# Patient Record
Sex: MALE | Race: ASIAN | NOT HISPANIC OR LATINO | Employment: FULL TIME | URBAN - METROPOLITAN AREA
[De-identification: names, ages, dates, MRNs, and addresses within clinical notes are randomized per-mention and may not be internally consistent; named-entity substitution may affect disease eponyms.]

---

## 2020-12-04 ENCOUNTER — TELEPHONE (OUTPATIENT)
Dept: SCHEDULING | Facility: IMAGING CENTER | Age: 50
End: 2020-12-04

## 2020-12-04 NOTE — LETTER
[facility]   Carlos Mcnamara M.D.  [address] [facility]   [place] [address]  Phone: [phone]  Fax: [phone]   Authorization for Release/Disclosure of   Protected Health Information   Name: CECIL ECHEVARRIA : 1970 SSN: [id number]   Address: [address] [place] [address] [place] [address] Phone:    [phone] (home)    I authorize the entity listed below to release/disclose the PHI below to:   [facility]/ Carlos Mcnamara M.D.. and Carlos Mcnamara M.D.   Provider or Entity Name: [facility]      Address: [address] [place] [address], Zip: [place] [address]   Phone:  [phone]    Fax:  [phone]   Reason for request: continuity of care   Information to be released:    [  ] LAST COLONOSCOPY,  including any PATH REPORT and follow-up  [  ] LAST FIT/COLOGUARD RESULT [  ] LAST DEXA  [  ] LAST MAMMOGRAM  [  ] LAST PAP  [  ] LAST LABS [  ] RETINA EXAM REPORT  [  ] IMMUNIZATION RECORDS  [ XXX ] Release all info      [  ] Check here and initial the line next to each item to release ALL health information INCLUDING  _____ Care and treatment for drug and / or alcohol abuse  _____ HIV testing, infection status, or AIDS  _____ Genetic Testing    DATES OF SERVICE OR TIME PERIOD TO BE DISCLOSED: _____________  I understand and acknowledge that:  * This Authorization may be revoked at any time by you in writing, except if your health information has already been used or disclosed.  * Your health information that will be used or disclosed as a result of you signing this authorization could be re-disclosed by the recipient. If this occurs, your re-disclosed health information may no longer be protected by State or Federal laws.  * You may refuse to sign this Authorization. Your refusal will not affect your ability to obtain treatment.  * This Authorization becomes effective upon signing and will  on (date) __________.      If no date is indicated, this Authorization will  one  (1) year from the signature date.    Name: Azeem Mejia    Signature: Continuity of Care    Date:     12/4/2020       PLEASE FAX REQUESTED RECORDS BACK TO: [phone]

## 2020-12-10 ENCOUNTER — OFFICE VISIT (OUTPATIENT)
Dept: MEDICAL GROUP | Age: 50
End: 2020-12-10
Payer: COMMERCIAL

## 2020-12-10 VITALS
TEMPERATURE: 98.5 F | HEART RATE: 72 BPM | DIASTOLIC BLOOD PRESSURE: 72 MMHG | SYSTOLIC BLOOD PRESSURE: 102 MMHG | BODY MASS INDEX: 27.19 KG/M2 | HEIGHT: 66 IN | OXYGEN SATURATION: 95 % | WEIGHT: 169.2 LBS

## 2020-12-10 DIAGNOSIS — Z12.11 SCREENING FOR MALIGNANT NEOPLASM OF COLON: ICD-10-CM

## 2020-12-10 DIAGNOSIS — E55.9 HYPOVITAMINOSIS D: ICD-10-CM

## 2020-12-10 DIAGNOSIS — Z00.00 HEALTH CARE MAINTENANCE: ICD-10-CM

## 2020-12-10 DIAGNOSIS — Z12.5 SCREENING FOR MALIGNANT NEOPLASM OF PROSTATE: ICD-10-CM

## 2020-12-10 DIAGNOSIS — E78.5 DYSLIPIDEMIA: ICD-10-CM

## 2020-12-10 DIAGNOSIS — J45.20 INTERMITTENT ASTHMA WITHOUT COMPLICATION, UNSPECIFIED ASTHMA SEVERITY: ICD-10-CM

## 2020-12-10 DIAGNOSIS — R53.83 FATIGUE, UNSPECIFIED TYPE: ICD-10-CM

## 2020-12-10 DIAGNOSIS — Z72.51 HIGH RISK SEXUAL BEHAVIOR, UNSPECIFIED TYPE: ICD-10-CM

## 2020-12-10 DIAGNOSIS — Z00.00 ANNUAL PHYSICAL EXAM: ICD-10-CM

## 2020-12-10 DIAGNOSIS — J30.2 SEASONAL ALLERGIES: ICD-10-CM

## 2020-12-10 PROCEDURE — 99386 PREV VISIT NEW AGE 40-64: CPT | Performed by: INTERNAL MEDICINE

## 2020-12-10 PROCEDURE — 99213 OFFICE O/P EST LOW 20 MIN: CPT | Mod: 25 | Performed by: INTERNAL MEDICINE

## 2020-12-10 RX ORDER — ALBUTEROL SULFATE 90 UG/1
2 AEROSOL, METERED RESPIRATORY (INHALATION) EVERY 6 HOURS PRN
Qty: 8.5 G | Refills: 3 | Status: SHIPPED | OUTPATIENT
Start: 2020-12-10 | End: 2021-10-23 | Stop reason: SDUPTHER

## 2020-12-10 SDOH — HEALTH STABILITY: MENTAL HEALTH: HOW OFTEN DO YOU HAVE 6 OR MORE DRINKS ON ONE OCCASION?: NEVER

## 2020-12-10 SDOH — HEALTH STABILITY: MENTAL HEALTH: HOW MANY STANDARD DRINKS CONTAINING ALCOHOL DO YOU HAVE ON A TYPICAL DAY?: 1 OR 2

## 2020-12-10 SDOH — HEALTH STABILITY: MENTAL HEALTH: HOW OFTEN DO YOU HAVE A DRINK CONTAINING ALCOHOL?: MONTHLY OR LESS

## 2020-12-10 ASSESSMENT — PATIENT HEALTH QUESTIONNAIRE - PHQ9: CLINICAL INTERPRETATION OF PHQ2 SCORE: 0

## 2020-12-10 ASSESSMENT — ANXIETY QUESTIONNAIRES
3. WORRYING TOO MUCH ABOUT DIFFERENT THINGS: NOT AT ALL
7. FEELING AFRAID AS IF SOMETHING AWFUL MIGHT HAPPEN: NOT AT ALL
4. TROUBLE RELAXING: NOT AT ALL
2. NOT BEING ABLE TO STOP OR CONTROL WORRYING: NOT AT ALL
5. BEING SO RESTLESS THAT IT IS HARD TO SIT STILL: NOT AT ALL
1. FEELING NERVOUS, ANXIOUS, OR ON EDGE: NOT AT ALL
6. BECOMING EASILY ANNOYED OR IRRITABLE: NOT AT ALL
GAD7 TOTAL SCORE: 0

## 2020-12-10 NOTE — PROGRESS NOTES
CHIEF COMPLAINT     Chief Complaint   Patient presents with   • Establish Care   Asthma, STD testing    HPI  Azeem Mejia is a 50 y.o. male who presents today for the following     Dyslipidemia  The patient had abnormal lipid panel, no medications  Diet: Advised low-calorie  Exercise: Advised daily  BMI: 27  FH: neg     Hypovitaminosis D, fatigue  The patient had low vitamin D level.  Complains of fatigue  Vitamin D supplement: Multivitamins.    Allergies, asthma  Onset: Childhood  Current symptoms: Denies nasal congestion, sneezing, wheezing, chest pain  Current treatment: none  - Albuterol use: Not recent  Course: Improved, stable  FH of asthma: Sister    High risk homosexual behavior, MSM  Requested STD testing, the last was done 2 years ago with previous PCP.  Denies::   · Abnormal penile discharge, sores  · Fever, chills, anorexia, weight loss  · Skin rash  · Dysuria  · Frequency  · Urgency  · Suprapubic discomfort  · Abdominal/flank pain    Reviewed PMH, PSH, FH, SH, ALL, HCM/IMM, no changes  Reviewed MEDS, no changes    CURRENT MEDICATIONS  Current Outpatient Medications   Medication Sig Dispense Refill   • albuterol 108 (90 Base) MCG/ACT Aero Soln inhalation aerosol Inhale 2 Puffs every 6 hours as needed for Shortness of Breath. 8.5 g 3     No current facility-administered medications for this visit.      ALLERGIES  Allergies: Patient has no allergy information on record.  PAST MEDICAL HISTORY  Past Medical History:   Diagnosis Date   • Asthma    • Hyperlipidemia      SURGICAL HISTORY  He  has no past surgical history on file.  SOCIAL HISTORY  Social History     Tobacco Use   • Smoking status: Never Smoker   • Smokeless tobacco: Never Used   Substance Use Topics   • Alcohol use: Yes     Frequency: Monthly or less     Drinks per session: 1 or 2     Binge frequency: Never   • Drug use: Not Currently     Social History     Social History Narrative   • Not on file     FAMILY HISTORY  Family History  "  Problem Relation Age of Onset   • Diabetes Father    • Asthma Sister    • Hyperlipidemia Neg Hx    • Hypertension Neg Hx      Family Status   Relation Name Status   • Fa  (Not Specified)   • Sis  (Not Specified)   • Neg Hx  (Not Specified)     ROS   Constitutional: Negative for fever, chills, fatigue.  HENT: Negative for congestion, sore throat.  Eyes: Negative for vision problems.   Respiratory: Negative for cough, shortness of breath.  Cardiovascular: Negative for chest pain, palpitations.   Gastrointestinal: Negative for heartburn, nausea, abdominal pain.   Genitourinary: Negative for dysuria.  Musculoskeletal: Negative for significant myalgia, back and joint pain.   Skin: Negative for rash.   Neuro: Negative for dizziness, weakness and headaches.   Endo/Heme/Allergies: Does not bruise/bleed easily.   Psychiatric/Behavioral: Negative for depression.    Objective   Blood Pressure 102/72 (BP Location: Right arm, Patient Position: Sitting, BP Cuff Size: Adult)   Pulse 72   Temperature 36.9 °C (98.5 °F) (Temporal)   Height 1.676 m (5' 6\")   Weight 76.7 kg (169 lb 3.2 oz)   Oxygen Saturation 95%   Body Mass Index 27.31 kg/m²   Physical Exam:  Constitutional: Alert, no distress, well-groomed.  Skin: No rash in visible areas.  Eye: Round. Conjunctiva clear, lids normal.  ENMT: Lips pink without lesions, good dentition. Phonation normal.  Neck: No visible masses or thyromegaly. Moves freely without pain.  CV: no peripheral cyanosis, tachycardia.  Respiratory: Unlabored respiratory effort, no cough or audible wheezing.  Psych: Alert and oriented x3, normal affect and mood.     Labs     Labs are reviewed and discussed with a patient  No results found for: CHOLSTRLTOT, LDL, HDL, TRIGLYCERIDE    No results found for: SODIUM, POTASSIUM, CHLORIDE, CO2, GLUCOSE, BUN, CREATININE, BUNCREATRAT, GLOMRATE  No results found for: ALKPHOSPHAT, ASTSGOT, ALTSGPT, TBILIRUBIN   No results found for: HBA1C  No results found for: " TSH  No results found for: FREET4    No results found for: WBC, RBC, HEMOGLOBIN, HEMATOCRIT, MCV, MCH, MCHC, MPV, NEUTSPOLYS, LYMPHOCYTES, MONOCYTES, EOSINOPHILS, BASOPHILS, HYPOCHROMIA, ANISOCYTOSIS     Imaging      None    Assessment and Plan     Azeem Mejia is a 50 y.o. male    1. Annual physical exam  Reviewed PMH, PSH, FH, SH, ALL, MEDS, HCM/IMM.   Advised healthy habits, diet, exercise.    2. Health care maintenance  Per HPI    3. Screening for malignant neoplasm of prostate  - PROSTATE SPECIFIC AG SCREENING; Future    4. Screening for malignant neoplasm of colon  - REFERRAL TO GI FOR COLONOSCOPY    5. Dyslipidemia  Advised per HPI, pending labs  - Comp Metabolic Panel; Future  - Lipid Profile; Future    6. Hypovitaminosis D  Continue current supplement, pending labs  - VITAMIN D,25 HYDROXY; Future    7. Fatigue, unspecified type  Pending labs  - CBC WITH DIFFERENTIAL; Future  - TSH WITH REFLEX TO FT4; Future    8. Seasonal allergies  9. Intermittent asthma without complication, unspecified asthma severity  No current symptoms, make continue to use OTC antihistamine, albuterol as needed  - albuterol 108 (90 Base) MCG/ACT Aero Soln inhalation aerosol; Inhale 2 Puffs every 6 hours as needed for Shortness of Breath.  Dispense: 8.5 g; Refill: 3    10. High risk sexual behavior, unspecified type  Discussed about PREP  - HIV AG/AB COMBO ASSAY SCREENING; Future  - HSV I/II IGG & IGM SERUM; Future  - Chlamydia/GC PCR Urine Or Swab; Future  - RPR    Follow-up: in 1 year and prn

## 2021-10-21 ENCOUNTER — HOSPITAL ENCOUNTER (OUTPATIENT)
Dept: LAB | Facility: MEDICAL CENTER | Age: 51
End: 2021-10-21
Attending: INTERNAL MEDICINE
Payer: COMMERCIAL

## 2021-10-21 DIAGNOSIS — Z72.51 HIGH RISK SEXUAL BEHAVIOR, UNSPECIFIED TYPE: ICD-10-CM

## 2021-10-21 DIAGNOSIS — R53.83 FATIGUE, UNSPECIFIED TYPE: ICD-10-CM

## 2021-10-21 DIAGNOSIS — Z12.5 SCREENING FOR MALIGNANT NEOPLASM OF PROSTATE: ICD-10-CM

## 2021-10-21 DIAGNOSIS — E55.9 HYPOVITAMINOSIS D: ICD-10-CM

## 2021-10-21 LAB
25(OH)D3 SERPL-MCNC: 28 NG/ML (ref 30–100)
BASOPHILS # BLD AUTO: 0.6 % (ref 0–1.8)
BASOPHILS # BLD: 0.08 K/UL (ref 0–0.12)
EOSINOPHIL # BLD AUTO: 0.51 K/UL (ref 0–0.51)
EOSINOPHIL NFR BLD: 3.9 % (ref 0–6.9)
ERYTHROCYTE [DISTWIDTH] IN BLOOD BY AUTOMATED COUNT: 40 FL (ref 35.9–50)
HCT VFR BLD AUTO: 43.3 % (ref 42–52)
HGB BLD-MCNC: 14.7 G/DL (ref 14–18)
HIV 1+2 AB+HIV1 P24 AG SERPL QL IA: NORMAL
IMM GRANULOCYTES # BLD AUTO: 0.04 K/UL (ref 0–0.11)
IMM GRANULOCYTES NFR BLD AUTO: 0.3 % (ref 0–0.9)
LYMPHOCYTES # BLD AUTO: 2.64 K/UL (ref 1–4.8)
LYMPHOCYTES NFR BLD: 20.2 % (ref 22–41)
MCH RBC QN AUTO: 32.7 PG (ref 27–33)
MCHC RBC AUTO-ENTMCNC: 33.9 G/DL (ref 33.7–35.3)
MCV RBC AUTO: 96.2 FL (ref 81.4–97.8)
MONOCYTES # BLD AUTO: 0.92 K/UL (ref 0–0.85)
MONOCYTES NFR BLD AUTO: 7 % (ref 0–13.4)
NEUTROPHILS # BLD AUTO: 8.87 K/UL (ref 1.82–7.42)
NEUTROPHILS NFR BLD: 68 % (ref 44–72)
NRBC # BLD AUTO: 0 K/UL
NRBC BLD-RTO: 0 /100 WBC
PLATELET # BLD AUTO: 248 K/UL (ref 164–446)
PMV BLD AUTO: 9.6 FL (ref 9–12.9)
PSA SERPL-MCNC: 0.84 NG/ML (ref 0–4)
RBC # BLD AUTO: 4.5 M/UL (ref 4.7–6.1)
TREPONEMA PALLIDUM IGG+IGM AB [PRESENCE] IN SERUM OR PLASMA BY IMMUNOASSAY: NORMAL
TSH SERPL DL<=0.005 MIU/L-ACNC: 2.35 UIU/ML (ref 0.38–5.33)
WBC # BLD AUTO: 13.1 K/UL (ref 4.8–10.8)

## 2021-10-21 PROCEDURE — 82306 VITAMIN D 25 HYDROXY: CPT

## 2021-10-21 PROCEDURE — 84153 ASSAY OF PSA TOTAL: CPT

## 2021-10-21 PROCEDURE — 36415 COLL VENOUS BLD VENIPUNCTURE: CPT

## 2021-10-21 PROCEDURE — 86780 TREPONEMA PALLIDUM: CPT

## 2021-10-21 PROCEDURE — 85025 COMPLETE CBC W/AUTO DIFF WBC: CPT

## 2021-10-21 PROCEDURE — 84443 ASSAY THYROID STIM HORMONE: CPT

## 2021-10-21 PROCEDURE — 86694 HERPES SIMPLEX NES ANTBDY: CPT

## 2021-10-21 PROCEDURE — 87389 HIV-1 AG W/HIV-1&-2 AB AG IA: CPT

## 2021-10-21 PROCEDURE — 87491 CHLMYD TRACH DNA AMP PROBE: CPT

## 2021-10-21 PROCEDURE — 87591 N.GONORRHOEAE DNA AMP PROB: CPT

## 2021-10-22 LAB
C TRACH DNA SPEC QL NAA+PROBE: NEGATIVE
N GONORRHOEA DNA SPEC QL NAA+PROBE: NEGATIVE
SPECIMEN SOURCE: NORMAL

## 2021-10-23 DIAGNOSIS — J45.20 INTERMITTENT ASTHMA WITHOUT COMPLICATION, UNSPECIFIED ASTHMA SEVERITY: ICD-10-CM

## 2021-10-24 LAB
HSV1+2 IGG SER IA-ACNC: >22.4 IV
HSV1+2 IGM SER IA-ACNC: 0.38 IV

## 2021-10-25 ENCOUNTER — TELEMEDICINE (OUTPATIENT)
Dept: MEDICAL GROUP | Age: 51
End: 2021-10-25
Payer: COMMERCIAL

## 2021-10-25 VITALS — BODY MASS INDEX: 26.2 KG/M2 | WEIGHT: 163 LBS | TEMPERATURE: 97.2 F | HEIGHT: 66 IN

## 2021-10-25 DIAGNOSIS — J30.2 SEASONAL ALLERGIES: ICD-10-CM

## 2021-10-25 DIAGNOSIS — Z12.11 SCREENING FOR MALIGNANT NEOPLASM OF COLON: ICD-10-CM

## 2021-10-25 DIAGNOSIS — Z00.00 HEALTH CARE MAINTENANCE: ICD-10-CM

## 2021-10-25 DIAGNOSIS — D72.829 LEUKOCYTOSIS, UNSPECIFIED TYPE: ICD-10-CM

## 2021-10-25 DIAGNOSIS — J45.20 INTERMITTENT ASTHMA WITHOUT COMPLICATION, UNSPECIFIED ASTHMA SEVERITY: ICD-10-CM

## 2021-10-25 DIAGNOSIS — D64.9 ANEMIA, UNSPECIFIED TYPE: ICD-10-CM

## 2021-10-25 DIAGNOSIS — E78.5 DYSLIPIDEMIA: ICD-10-CM

## 2021-10-25 DIAGNOSIS — Z00.00 ANNUAL PHYSICAL EXAM: ICD-10-CM

## 2021-10-25 PROCEDURE — 99396 PREV VISIT EST AGE 40-64: CPT | Mod: 95 | Performed by: INTERNAL MEDICINE

## 2021-10-25 RX ORDER — ALBUTEROL SULFATE 90 UG/1
2 AEROSOL, METERED RESPIRATORY (INHALATION) EVERY 6 HOURS PRN
Qty: 8.5 G | Refills: 1 | Status: ON HOLD | OUTPATIENT
Start: 2021-10-25 | End: 2022-10-24

## 2021-10-25 ASSESSMENT — PATIENT HEALTH QUESTIONNAIRE - PHQ9: CLINICAL INTERPRETATION OF PHQ2 SCORE: 0

## 2021-10-25 NOTE — PROGRESS NOTES
Telemedicine Visit: Established Patient     This Remote Face to Face encounter was conducted via Zoom. Given the importance of social distancing and other strategies recommended to reduce the risk of COVID-19 transmission, I am providing medical care to this patient via audio/video visit in place of an in person visit at the request of the patient. Verbal consent to telehealth, risks, benefits, and consequences were discussed. Patient retains the right to withdraw at any time. All existing confidentiality protections apply. The patient has access to all transmitted medical information. No dissemination of any patient images or information to other entities without further written consent.    CHIEF COMPLAINT     Chief Complaint   Patient presents with   • Annual Exam     HPI  Azeem Mejia is a 51 y.o. male who presents today for the following     HCM  Advised:  -Low-calorie diet   -Daily exercise  -Dental exam once to twice a year  -Sunscreen outdoors.    Immunizations  Due pneumonia, Shingrix, influenza, advised    Dyslipidemia  The patient had slightly abnormal lipid panel, no medications.  Diet /Exercise/BMI:  As above  FH: Unknown    Allergies/asthma, leukocytosis, anemia  Complains of intermittent nasal congestion and sneezing, intermittent cough with occasional sputum that is mainly clear/white, and very rarely slightly yellow.  Denies fever, chills, shortness of breath.    CBC came back with elevated WBC count with slight shift to the left.   RBC count was slightly decreased, normal Hgb/MCV.    Reviewed PMH, PSH, FH, SH, ALL, HCM/IMM, no changes  Reviewed MEDS, no changes    Patient Active Problem List    Diagnosis Date Noted   • Health care maintenance 12/10/2020   • Dyslipidemia 12/10/2020   • Seasonal allergies 12/10/2020   • Intermittent asthma without complication 12/10/2020     CURRENT MEDICATIONS  Current Outpatient Medications   Medication Sig Dispense Refill   • albuterol 108 (90 Base)  "MCG/ACT Aero Soln inhalation aerosol Inhale 2 Puffs every 6 hours as needed for Shortness of Breath. 8.5 g 1     No current facility-administered medications for this visit.     ALLERGIES  Allergies: Patient has no allergy information on record.  PAST MEDICAL HISTORY  Past Medical History:   Diagnosis Date   • Asthma    • Hyperlipidemia      SURGICAL HISTORY  He  has no past surgical history on file.  SOCIAL HISTORY  Social History     Tobacco Use   • Smoking status: Never Smoker   • Smokeless tobacco: Never Used   Vaping Use   • Vaping Use: Never used   Substance Use Topics   • Alcohol use: Yes   • Drug use: Not Currently     Social History     Social History Narrative   • Not on file     FAMILY HISTORY  Family History   Problem Relation Age of Onset   • Diabetes Father    • Asthma Sister    • Hyperlipidemia Neg Hx    • Hypertension Neg Hx      Family Status   Relation Name Status   • Fa  (Not Specified)   • Sis  (Not Specified)   • Neg Hx  (Not Specified)       ROS   Constitutional: Negative for fever, chills, fatigue.  HENT: Negative for congestion, sore throat.  Eyes: Negative for vision problems.   Respiratory: Negative for cough, shortness of breath.  Cardiovascular: Negative for chest pain, palpitations.   Gastrointestinal: Negative for heartburn, nausea, abdominal pain.   Genitourinary: Negative for dysuria.  Musculoskeletal: Negative for significant myalgia, back and joint pain.   Skin: Negative for rash.   Neuro: Negative for dizziness, weakness and headaches.   Endo/Heme/Allergies: Does not bruise/bleed easily.   Psychiatric/Behavioral: Negative for depression.    Objective   Vitals obtained by patient:  Temperature 36.2 °C (97.2 °F)   Height 1.676 m (5' 6\")   Weight 73.9 kg (163 lb)   Body Mass Index 26.31 kg/m²   Physical Exam:  Constitutional: Alert, no distress, well-groomed.  Skin: No rash in visible areas.  Eye: Round. Conjunctiva clear, lids normal.  ENMT: Lips pink without lesions, good " dentition. Phonation normal.  Neck: No visible masses or thyromegaly. Moves freely without pain.  CV: no peripheral cyanosis, tachycardia.  Respiratory: Unlabored respiratory effort, no cough or audible wheezing.  Psych: Alert and oriented x3, normal affect and mood.     Labs     Labs are reviewed and discussed with a patient    Lab Results   Component Value Date/Time    WBC 13.1 (H) 10/21/2021 08:12 AM    RBC 4.50 (L) 10/21/2021 08:12 AM    HEMOGLOBIN 14.7 10/21/2021 08:12 AM    HEMATOCRIT 43.3 10/21/2021 08:12 AM    MCV 96.2 10/21/2021 08:12 AM    MCH 32.7 10/21/2021 08:12 AM    MCHC 33.9 10/21/2021 08:12 AM    MPV 9.6 10/21/2021 08:12 AM    NEUTSPOLYS 68.00 10/21/2021 08:12 AM    LYMPHOCYTES 20.20 (L) 10/21/2021 08:12 AM    MONOCYTES 7.00 10/21/2021 08:12 AM    EOSINOPHILS 3.90 10/21/2021 08:12 AM    BASOPHILS 0.60 10/21/2021 08:12 AM      Imaging      None    Assessment and Plan     Azeem Mejia is a 51 y.o. male    1. Annual physical exam  Reviewed PMH, PSH, FH, SH, ALL, MEDS, HCM/IMM.   Advised healthy habits, diet, exercise.    2. Health care maintenance  Per HPI    3. Screening for malignant neoplasm of colon  - REFERRAL TO GI FOR COLONOSCOPY    4. Dyslipidemia  Borderline, advised low roshan diet, daily exercise, WT control    5. Seasonal allergies  6. Intermittent asthma without complication, unspecified asthma severity  No infection symptoms    7. Leukocytosis, unspecified type  Labs in 2-4 weeks, according to sx  - CBC WITH DIFFERENTIAL; Standing  - Sed Rate; Future  - CRP QUANTITIVE (NON-CARDIAC); Future    8. Anemia, unspecified type  F/u labs  - REFERRAL TO GI FOR COLONOSCOPY  - REFERRAL TO GI   - OCCULT BLOOD FECES IMMUNOASSAY; Future  - IRON/TOTAL IRON BIND; Future    Follow-up: according to results

## 2021-10-26 ENCOUNTER — HOSPITAL ENCOUNTER (OUTPATIENT)
Facility: MEDICAL CENTER | Age: 51
End: 2021-10-26
Attending: INTERNAL MEDICINE
Payer: COMMERCIAL

## 2021-10-26 ENCOUNTER — APPOINTMENT (OUTPATIENT)
Dept: LAB | Facility: MEDICAL CENTER | Age: 51
End: 2021-10-26
Attending: INTERNAL MEDICINE
Payer: COMMERCIAL

## 2021-10-26 DIAGNOSIS — E78.5 DYSLIPIDEMIA: ICD-10-CM

## 2021-10-26 PROCEDURE — 80061 LIPID PANEL: CPT

## 2021-10-26 PROCEDURE — 80053 COMPREHEN METABOLIC PANEL: CPT

## 2021-10-27 LAB
ALBUMIN SERPL BCP-MCNC: 4.2 G/DL (ref 3.2–4.9)
ALBUMIN/GLOB SERPL: 1.3 G/DL
ALP SERPL-CCNC: 121 U/L (ref 30–99)
ALT SERPL-CCNC: 41 U/L (ref 2–50)
ANION GAP SERPL CALC-SCNC: 12 MMOL/L (ref 7–16)
AST SERPL-CCNC: 39 U/L (ref 12–45)
BILIRUB SERPL-MCNC: 0.3 MG/DL (ref 0.1–1.5)
BUN SERPL-MCNC: 9 MG/DL (ref 8–22)
CALCIUM SERPL-MCNC: 8.7 MG/DL (ref 8.5–10.5)
CHLORIDE SERPL-SCNC: 104 MMOL/L (ref 96–112)
CHOLEST SERPL-MCNC: 168 MG/DL (ref 100–199)
CO2 SERPL-SCNC: 20 MMOL/L (ref 20–33)
CREAT SERPL-MCNC: 0.46 MG/DL (ref 0.5–1.4)
GLOBULIN SER CALC-MCNC: 3.2 G/DL (ref 1.9–3.5)
GLUCOSE SERPL-MCNC: 105 MG/DL (ref 65–99)
HDLC SERPL-MCNC: 47 MG/DL
LDLC SERPL CALC-MCNC: 73 MG/DL
POTASSIUM SERPL-SCNC: 4.1 MMOL/L (ref 3.6–5.5)
PROT SERPL-MCNC: 7.4 G/DL (ref 6–8.2)
SODIUM SERPL-SCNC: 136 MMOL/L (ref 135–145)
TRIGL SERPL-MCNC: 238 MG/DL (ref 0–149)

## 2022-06-28 ENCOUNTER — TELEMEDICINE (OUTPATIENT)
Dept: MEDICAL GROUP | Age: 52
End: 2022-06-28
Payer: COMMERCIAL

## 2022-06-28 VITALS — HEIGHT: 66 IN | WEIGHT: 161.2 LBS | BODY MASS INDEX: 25.91 KG/M2 | HEART RATE: 96 BPM

## 2022-06-28 DIAGNOSIS — Z12.11 SCREENING FOR MALIGNANT NEOPLASM OF COLON: ICD-10-CM

## 2022-06-28 DIAGNOSIS — M25.572 LEFT ANKLE PAIN, UNSPECIFIED CHRONICITY: ICD-10-CM

## 2022-06-28 DIAGNOSIS — Z72.51 HIGH RISK SEXUAL BEHAVIOR, UNSPECIFIED TYPE: ICD-10-CM

## 2022-06-28 PROCEDURE — 99214 OFFICE O/P EST MOD 30 MIN: CPT | Mod: 95 | Performed by: INTERNAL MEDICINE

## 2022-06-28 RX ORDER — LOTEPREDNOL ETABONATE 3.8 MG/G
GEL OPHTHALMIC
COMMUNITY
Start: 2021-10-31 | End: 2022-09-26

## 2022-06-28 RX ORDER — MELOXICAM 15 MG/1
15 TABLET ORAL DAILY
Qty: 60 TABLET | Refills: 1 | Status: SHIPPED | OUTPATIENT
Start: 2022-06-28 | End: 2022-09-26

## 2022-06-28 RX ORDER — SILDENAFIL 100 MG/1
100 TABLET, FILM COATED ORAL
Qty: 10 TABLET | Refills: 3 | Status: ON HOLD | OUTPATIENT
Start: 2022-06-28 | End: 2022-10-24

## 2022-06-28 ASSESSMENT — FIBROSIS 4 INDEX: FIB4 SCORE: 1.28

## 2022-06-28 ASSESSMENT — PATIENT HEALTH QUESTIONNAIRE - PHQ9: CLINICAL INTERPRETATION OF PHQ2 SCORE: 0

## 2022-06-28 NOTE — PROGRESS NOTES
Telemedicine Visit: Established Patient     This Remote Face to Face encounter was conducted via Zoom. Given the importance of social distancing and other strategies recommended to reduce the risk of COVID-19 transmission, I am providing medical care to this patient via audio/video visit in place of an in person visit at the request of the patient. Verbal consent to telehealth, risks, benefits, and consequences were discussed. Patient retains the right to withdraw at any time. All existing confidentiality protections apply. The patient has access to all transmitted medical information. No dissemination of any patient images or information to other entities without further written consent.    Chief Complaint   Patient presents with   • Leg Pain     HPI  Azeem Mejia is a 51 y.o. 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    - No history of recent spinal surgery or injection.    Denies:  - numbness/saddle anesthesia.  - bowel/bladder changes, fever.   - trauma  - nausea/vomiting  - chest pain, shortness of breath, abdominal pain.      STD testing   The patient changed his partner, requested STD testing  Denies:   · Dysuria  · Frequency  · Urgency  · Suprapubic discomfort  · Abdominal/flank pain  · Fever, chills  · Urine color/odor change  · Skin rash, fever, chills.      Reviewed PMH, PSH, FH, SH, ALL, HCM/IMM, no changes  Reviewed  MEDS, no changes    Patient Active Problem List    Diagnosis Date Noted   • Health care maintenance 12/10/2020   • Dyslipidemia 12/10/2020   • Seasonal allergies 12/10/2020   • Intermittent asthma without complication 12/10/2020     CURRENT MEDICATIONS  Current Outpatient Medications   Medication Sig Dispense Refill   • Loteprednol Etabonate (LOTEMAX SM) 0.38 % Gel      • Multiple Vitamin (MULTIVITAMIN ADULT PO) Take  by mouth.     • albuterol 108 (90 Base) MCG/ACT Aero Soln inhalation aerosol Inhale 2 Puffs every 6 hours as needed for Shortness of Breath. 8.5 g 1     No current facility-administered medications for this visit.     ALLERGIES  Allergies: Patient has no known allergies.  PAST MEDICAL HISTORY  Past Medical History:   Diagnosis Date   • Asthma    • Hyperlipidemia      SURGICAL HISTORY  He  has no past surgical history on file.  SOCIAL HISTORY  Social History     Tobacco Use   • Smoking status: Never Smoker   • Smokeless tobacco: Never Used   Vaping Use   • Vaping Use: Never used   Substance Use Topics   • Alcohol use: Yes   • Drug use: Not Currently     Social History     Social History Narrative   • Not on file     FAMILY HISTORY  Family History   Problem Relation Age of Onset   • Diabetes Father    • Asthma Sister    • Hyperlipidemia Neg Hx    • Hypertension Neg Hx      Family Status   Relation Name Status   • Fa  (Not Specified)   • Sis  (Not Specified)   • Neg Hx  (Not Specified)     ROS   Constitutional: Negative for fever, chills, fatigue.  HENT: Negative for congestion, sore throat.  Eyes: Negative for vision problems.   Respiratory: Negative for cough, shortness of breath.  Cardiovascular: Negative for chest pain, palpitations.   Gastrointestinal: Negative for heartburn, nausea, abdominal pain.   Genitourinary: Negative for dysuria.  Musculoskeletal: Per HPI.   Skin: Negative for rash.   Neuro: Negative for dizziness, weakness and headaches.   Endo/Heme/Allergies: Does not bruise/bleed easily.  "  Psychiatric/Behavioral: Negative for depression.    PHYSICAL EXAM   Vs obtained by patient  Pulse 96   Height 1.676 m (5' 6\")   Weight 73.1 kg (161 lb 3.2 oz)  Body mass index is 26.02 kg/m².  General:  NAD, well appearing  HEENT:   NC/AT, PERRLA, EOMI.  Cardiovascular: unlabored breathing, no peripheral cyanosis or swelling.  Lungs:   no respiratory distress.  Abdomen: non- distended.  Extremities:  No LE swelling.  Skin:  Warm, dry.  No erythema. No rash.   Neurologic: Alert & oriented x 3. CN II-XII grossly intact. No focal deficits.  Psychiatric:  Affect normal, mood normal, judgment normal.    Labs     Labs are reviewed and discussed with a patient  Lab Results   Component Value Date/Time    CHOLSTRLTOT 168 10/26/2021 03:15 PM    LDL 73 10/26/2021 03:15 PM    HDL 47 10/26/2021 03:15 PM    TRIGLYCERIDE 238 (H) 10/26/2021 03:15 PM       Lab Results   Component Value Date/Time    SODIUM 136 10/26/2021 03:15 PM    POTASSIUM 4.1 10/26/2021 03:15 PM    CHLORIDE 104 10/26/2021 03:15 PM    CO2 20 10/26/2021 03:15 PM    GLUCOSE 105 (H) 10/26/2021 03:15 PM    BUN 9 10/26/2021 03:15 PM    CREATININE 0.46 (L) 10/26/2021 03:15 PM     Lab Results   Component Value Date/Time    ALKPHOSPHAT 121 (H) 10/26/2021 03:15 PM    ASTSGOT 39 10/26/2021 03:15 PM    ALTSGPT 41 10/26/2021 03:15 PM    TBILIRUBIN 0.3 10/26/2021 03:15 PM      Lab Results   Component Value Date/Time    WBC 13.1 (H) 10/21/2021 08:12 AM    RBC 4.50 (L) 10/21/2021 08:12 AM    HEMOGLOBIN 14.7 10/21/2021 08:12 AM    HEMATOCRIT 43.3 10/21/2021 08:12 AM    MCV 96.2 10/21/2021 08:12 AM    MCH 32.7 10/21/2021 08:12 AM    MCHC 33.9 10/21/2021 08:12 AM    MPV 9.6 10/21/2021 08:12 AM    NEUTSPOLYS 68.00 10/21/2021 08:12 AM    LYMPHOCYTES 20.20 (L) 10/21/2021 08:12 AM    MONOCYTES 7.00 10/21/2021 08:12 AM    EOSINOPHILS 3.90 10/21/2021 08:12 AM    BASOPHILS 0.60 10/21/2021 08:12 AM      Imaging     Pending    Assessment and Plan     Azeem Mejia is a 51 " y.o. male    1. Left ankle pain, unspecified chronicity  Advised brace, activity as tolerated  - DX-ANKLE 2- VIEWS LEFT; Future  - Referral to Occupational Medicine  - meloxicam (MOBIC) 15 MG tablet; Take 1 Tablet by mouth every day.  Dispense: 60 Tablet; Refill: 1    2. High risk sexual behavior, unspecified type  Pending labs, advised to use condom  - HIV AG/AB COMBO ASSAY SCREENING; Future  - Chlamydia/GC, PCR (Urine); Future  - HSV I/II IGG & IGM SERUM; Future    3. Screening for malignant neoplasm of colon  - Referral to GI for Colonoscopy    Counseling:   - Smoking:  Nonsmoker    Followup: PRN    All questions are answered.    Please note that this dictation was created using voice recognition software, and that there might be errors of myranda and possibly content.

## 2022-08-17 ENCOUNTER — HOSPITAL ENCOUNTER (OUTPATIENT)
Dept: RADIOLOGY | Facility: MEDICAL CENTER | Age: 52
End: 2022-08-17
Attending: INTERNAL MEDICINE
Payer: COMMERCIAL

## 2022-08-17 ENCOUNTER — OFFICE VISIT (OUTPATIENT)
Dept: MEDICAL GROUP | Age: 52
End: 2022-08-17
Payer: COMMERCIAL

## 2022-08-17 ENCOUNTER — HOSPITAL ENCOUNTER (OUTPATIENT)
Dept: LAB | Facility: MEDICAL CENTER | Age: 52
End: 2022-08-17
Attending: INTERNAL MEDICINE
Payer: COMMERCIAL

## 2022-08-17 VITALS
BODY MASS INDEX: 26.71 KG/M2 | HEART RATE: 78 BPM | SYSTOLIC BLOOD PRESSURE: 132 MMHG | HEIGHT: 66 IN | TEMPERATURE: 96.9 F | WEIGHT: 166.2 LBS | OXYGEN SATURATION: 98 % | DIASTOLIC BLOOD PRESSURE: 84 MMHG

## 2022-08-17 DIAGNOSIS — R59.0 CERVICAL LYMPHADENOPATHY: ICD-10-CM

## 2022-08-17 DIAGNOSIS — D72.829 LEUKOCYTOSIS, UNSPECIFIED TYPE: ICD-10-CM

## 2022-08-17 DIAGNOSIS — D64.9 ANEMIA, UNSPECIFIED TYPE: ICD-10-CM

## 2022-08-17 DIAGNOSIS — Z72.51 HIGH RISK SEXUAL BEHAVIOR, UNSPECIFIED TYPE: ICD-10-CM

## 2022-08-17 LAB
BASOPHILS # BLD AUTO: 1 % (ref 0–1.8)
BASOPHILS # BLD: 0.07 K/UL (ref 0–0.12)
C TRACH DNA SPEC QL NAA+PROBE: NEGATIVE
EOSINOPHIL # BLD AUTO: 0.53 K/UL (ref 0–0.51)
EOSINOPHIL NFR BLD: 7.2 % (ref 0–6.9)
ERYTHROCYTE [DISTWIDTH] IN BLOOD BY AUTOMATED COUNT: 39.8 FL (ref 35.9–50)
ERYTHROCYTE [SEDIMENTATION RATE] IN BLOOD BY WESTERGREN METHOD: 3 MM/HOUR (ref 0–20)
HCT VFR BLD AUTO: 46 % (ref 42–52)
HGB BLD-MCNC: 15.4 G/DL (ref 14–18)
HIV 1+2 AB+HIV1 P24 AG SERPL QL IA: NORMAL
IMM GRANULOCYTES # BLD AUTO: 0.02 K/UL (ref 0–0.11)
IMM GRANULOCYTES NFR BLD AUTO: 0.3 % (ref 0–0.9)
LDH SERPL L TO P-CCNC: 191 U/L (ref 107–266)
LYMPHOCYTES # BLD AUTO: 1.96 K/UL (ref 1–4.8)
LYMPHOCYTES NFR BLD: 26.7 % (ref 22–41)
MCH RBC QN AUTO: 32.2 PG (ref 27–33)
MCHC RBC AUTO-ENTMCNC: 33.5 G/DL (ref 33.7–35.3)
MCV RBC AUTO: 96 FL (ref 81.4–97.8)
MONOCYTES # BLD AUTO: 0.48 K/UL (ref 0–0.85)
MONOCYTES NFR BLD AUTO: 6.5 % (ref 0–13.4)
N GONORRHOEA DNA SPEC QL NAA+PROBE: NEGATIVE
NEUTROPHILS # BLD AUTO: 4.29 K/UL (ref 1.82–7.42)
NEUTROPHILS NFR BLD: 58.3 % (ref 44–72)
NRBC # BLD AUTO: 0 K/UL
NRBC BLD-RTO: 0 /100 WBC
PLATELET # BLD AUTO: 290 K/UL (ref 164–446)
PMV BLD AUTO: 9.6 FL (ref 9–12.9)
QORDR QORDR: NORMAL
RBC # BLD AUTO: 4.79 M/UL (ref 4.7–6.1)
SPECIMEN SOURCE: NORMAL
URATE SERPL-MCNC: 6.5 MG/DL (ref 2.5–8.3)
WBC # BLD AUTO: 7.4 K/UL (ref 4.8–10.8)

## 2022-08-17 PROCEDURE — 36415 COLL VENOUS BLD VENIPUNCTURE: CPT

## 2022-08-17 PROCEDURE — 76536 US EXAM OF HEAD AND NECK: CPT

## 2022-08-17 PROCEDURE — 83615 LACTATE (LD) (LDH) ENZYME: CPT

## 2022-08-17 PROCEDURE — 87591 N.GONORRHOEAE DNA AMP PROB: CPT

## 2022-08-17 PROCEDURE — 86665 EPSTEIN-BARR CAPSID VCA: CPT

## 2022-08-17 PROCEDURE — 86663 EPSTEIN-BARR ANTIBODY: CPT

## 2022-08-17 PROCEDURE — 99214 OFFICE O/P EST MOD 30 MIN: CPT | Performed by: INTERNAL MEDICINE

## 2022-08-17 PROCEDURE — 86694 HERPES SIMPLEX NES ANTBDY: CPT

## 2022-08-17 PROCEDURE — 85025 COMPLETE CBC W/AUTO DIFF WBC: CPT

## 2022-08-17 PROCEDURE — 86664 EPSTEIN-BARR NUCLEAR ANTIGEN: CPT

## 2022-08-17 PROCEDURE — 87389 HIV-1 AG W/HIV-1&-2 AB AG IA: CPT

## 2022-08-17 PROCEDURE — 85652 RBC SED RATE AUTOMATED: CPT

## 2022-08-17 PROCEDURE — 84550 ASSAY OF BLOOD/URIC ACID: CPT

## 2022-08-17 PROCEDURE — 87491 CHLMYD TRACH DNA AMP PROBE: CPT

## 2022-08-17 RX ORDER — AMOXICILLIN AND CLAVULANATE POTASSIUM 875; 125 MG/1; MG/1
1 TABLET, FILM COATED ORAL 2 TIMES DAILY
Qty: 20 TABLET | Refills: 0 | Status: SHIPPED | OUTPATIENT
Start: 2022-08-17 | End: 2022-08-27

## 2022-08-17 ASSESSMENT — FIBROSIS 4 INDEX: FIB4 SCORE: 1.28

## 2022-08-17 NOTE — PROGRESS NOTES
CHIEF COMPLAINT  Chief Complaint   Patient presents with    Bump     Patient states that he has a bump on his jaw line on his left side of neck.     HPI  Azeem Mejia is a 51 y.o. male who presents today for the following     LT cervical lump/gland, leukocytosis, anemia  The patient noticed nontender, movable lumps/glands below the jaw and left side of the neck in the last few days.    At my appointment on 10/25 6/22, ordered evaluation for leukocytosis/anemia, not done.    Patient has not have:  Fever, chills  Fatigue  Muscle or body aches  Headache  Congestion or runny nose  Sore throat  Cough  Shortness of breath or difficulty breathing  Anorexia, weight loss.    Reviewed PMH, PSH, FH, SH, ALL, HCM/IMM, no changes  Reviewed MEDS, no changes    Patient Active Problem List    Diagnosis Date Noted    Health care maintenance 12/10/2020    Dyslipidemia 12/10/2020    Seasonal allergies 12/10/2020    Intermittent asthma without complication 12/10/2020     CURRENT MEDICATIONS  Current Outpatient Medications   Medication Sig Dispense Refill    amoxicillin-clavulanate (AUGMENTIN) 875-125 MG Tab Take 1 Tablet by mouth 2 times a day for 10 days. 20 Tablet 0    Loteprednol Etabonate (LOTEMAX SM) 0.38 % Gel       meloxicam (MOBIC) 15 MG tablet Take 1 Tablet by mouth every day. 60 Tablet 1    sildenafil citrate (VIAGRA) 100 MG tablet Take 1 Tablet by mouth 1 time a day as needed for Erectile Dysfunction. 10 Tablet 3    albuterol 108 (90 Base) MCG/ACT Aero Soln inhalation aerosol Inhale 2 Puffs every 6 hours as needed for Shortness of Breath. 8.5 g 1     No current facility-administered medications for this visit.     ALLERGIES  Allergies: Seasonal  PAST MEDICAL HISTORY  Past Medical History:   Diagnosis Date    Asthma     Hyperlipidemia      SURGICAL HISTORY  He  has no past surgical history on file.  SOCIAL HISTORY  Social History     Tobacco Use    Smoking status: Never    Smokeless tobacco: Never   Vaping Use     "Vaping Use: Never used   Substance Use Topics    Alcohol use: Yes    Drug use: Not Currently     Social History     Social History Narrative    Not on file     FAMILY HISTORY  Family History   Problem Relation Age of Onset    Diabetes Father     Asthma Sister     Hyperlipidemia Neg Hx     Hypertension Neg Hx      Family Status   Relation Name Status    Fa  (Not Specified)    Sis  (Not Specified)    Neg Hx  (Not Specified)     ROS   Constitutional: Negative for fever, chills, fatigue.  HENT: Negative for congestion, sore throat. And per HPI.  Eyes: Negative for vision problems.   Respiratory: Negative for cough, shortness of breath.  Cardiovascular: Negative for chest pain, palpitations.   Gastrointestinal: Negative for heartburn, nausea, abdominal pain.   Genitourinary: Negative for dysuria.  Musculoskeletal: Negative for significant myalgia, back and joint pain.   Skin: Negative for rash.   Neuro: Negative for dizziness, weakness and headaches.   Endo/Heme/Allergies: Does not bruise/bleed easily.   Psychiatric/Behavioral: Negative for depression.    PHYSICAL EXAM   Blood Pressure 132/84 (BP Location: Right arm, Patient Position: Sitting, BP Cuff Size: Small adult)   Pulse 78   Temperature 36.1 °C (96.9 °F) (Temporal)   Height 1.676 m (5' 6\")   Weight 75.4 kg (166 lb 3.2 oz)   Oxygen Saturation 98%  Body mass index is 26.83 kg/m².  General:  NAD, well appearing  HEENT:   NC/AT, PERRLA, EOMI.  Non-tender, movable, sponge texture lump - submandibular left 2 - 2.5 cm, plus one LAD anterior to mid LT sternocleidomastoideus.  Possible 1 LAD in LT axilla, RT axilla, supraclavicular fossa bilaterally clear.  Cardiovascular: unlabored breathing, no peripheral cyanosis or swelling.  Lungs:   no respiratory distress.  Abdomen: non- distended.  Extremities:  No LE swelling.  Skin:  Warm, dry.  No erythema. No rash.   Neurologic: Alert & oriented x 3. CN II-XII grossly intact. No focal deficits.  Psychiatric:  Affect " normal, mood normal, judgment normal.    Labs     Labs are reviewed and discussed with a patient  Lab Results   Component Value Date/Time    CHOLSTRLTOT 168 10/26/2021 03:15 PM    LDL 73 10/26/2021 03:15 PM    HDL 47 10/26/2021 03:15 PM    TRIGLYCERIDE 238 (H) 10/26/2021 03:15 PM       Lab Results   Component Value Date/Time    SODIUM 136 10/26/2021 03:15 PM    POTASSIUM 4.1 10/26/2021 03:15 PM    CHLORIDE 104 10/26/2021 03:15 PM    CO2 20 10/26/2021 03:15 PM    GLUCOSE 105 (H) 10/26/2021 03:15 PM    BUN 9 10/26/2021 03:15 PM    CREATININE 0.46 (L) 10/26/2021 03:15 PM     Lab Results   Component Value Date/Time    ALKPHOSPHAT 121 (H) 10/26/2021 03:15 PM    ASTSGOT 39 10/26/2021 03:15 PM    ALTSGPT 41 10/26/2021 03:15 PM    TBILIRUBIN 0.3 10/26/2021 03:15 PM      No results found for: HBA1C  No results found for: TSH  No results found for: FREET4    Lab Results   Component Value Date/Time    WBC 13.1 (H) 10/21/2021 08:12 AM    RBC 4.50 (L) 10/21/2021 08:12 AM    HEMOGLOBIN 14.7 10/21/2021 08:12 AM    HEMATOCRIT 43.3 10/21/2021 08:12 AM    MCV 96.2 10/21/2021 08:12 AM    MCH 32.7 10/21/2021 08:12 AM    MCHC 33.9 10/21/2021 08:12 AM    MPV 9.6 10/21/2021 08:12 AM    NEUTSPOLYS 68.00 10/21/2021 08:12 AM    LYMPHOCYTES 20.20 (L) 10/21/2021 08:12 AM    MONOCYTES 7.00 10/21/2021 08:12 AM    EOSINOPHILS 3.90 10/21/2021 08:12 AM    BASOPHILS 0.60 10/21/2021 08:12 AM      Imaging     Pending    Assessment and Plan     Azeem Mejia is a 51 y.o. male    1. Cervical lymphadenopathy  Pending labs, given antibiotic  - Sed Rate; Future  - CBC WITH DIFFERENTIAL; Future  - URIC ACID; Future  - LDH; Future  - amoxicillin-clavulanate (AUGMENTIN) 875-125 MG Tab; Take 1 Tablet by mouth 2 times a day for 10 days.  Dispense: 20 Tablet; Refill: 0  - EBV CHRONIC/ACTIVE INFECTION; Future  - US-SOFT TISSUES OF HEAD - NECK; Future    Ordered labs at my appointment on 10/25/2021  2. Leukocytosis, unspecified type  3. Anemia,  unspecified type    Followup: Return if symptoms worsen or fail to improve.    All questions are answered.    Please note that this dictation was created using voice recognition software, and that there might be errors of myranda and possibly content.

## 2022-08-18 DIAGNOSIS — R59.0 ANTERIOR CERVICAL LYMPHADENOPATHY: ICD-10-CM

## 2022-08-18 NOTE — PROGRESS NOTES
Order for CT neck, referral to ENT, IOC.  Called patient, voicemail is not set up, sent him mychart msg.  Dr Jerez

## 2022-08-19 ENCOUNTER — TELEPHONE (OUTPATIENT)
Dept: HEMATOLOGY ONCOLOGY | Facility: MEDICAL CENTER | Age: 52
End: 2022-08-19
Payer: COMMERCIAL

## 2022-08-19 LAB
EBV EA-D IGG SER-ACNC: <5 U/ML (ref 0–10.9)
EBV NA IGG SER IA-ACNC: 110 U/ML (ref 0–21.9)
EBV VCA IGG SER IA-ACNC: 81.4 U/ML (ref 0–21.9)
HSV1+2 IGG SER IA-ACNC: >22.4 IV
HSV1+2 IGM SER IA-ACNC: 0.55 IV

## 2022-08-19 NOTE — TELEPHONE ENCOUNTER
Patient calling to schedule IOC appointment.  Referral has not been reviewed yet.      Patient was told that referral will be reviewed on Monday, and we would be contacting him to schedule.

## 2022-08-24 ENCOUNTER — TELEPHONE (OUTPATIENT)
Dept: HEMATOLOGY ONCOLOGY | Facility: MEDICAL CENTER | Age: 52
End: 2022-08-24
Payer: COMMERCIAL

## 2022-08-24 NOTE — TELEPHONE ENCOUNTER
Called the patient to reschedule their IOC appt until after their CT scheduled on 8/31. Mailbox was not set up, so was  unable to leave a message.

## 2022-08-30 ENCOUNTER — TELEPHONE (OUTPATIENT)
Dept: HEMATOLOGY ONCOLOGY | Facility: MEDICAL CENTER | Age: 52
End: 2022-08-30

## 2022-08-30 ENCOUNTER — HOSPITAL ENCOUNTER (OUTPATIENT)
Dept: RADIOLOGY | Facility: MEDICAL CENTER | Age: 52
End: 2022-08-30
Attending: NURSE PRACTITIONER
Payer: COMMERCIAL

## 2022-08-30 ENCOUNTER — HOSPITAL ENCOUNTER (OUTPATIENT)
Dept: HEMATOLOGY ONCOLOGY | Facility: MEDICAL CENTER | Age: 52
End: 2022-08-30
Attending: NURSE PRACTITIONER
Payer: COMMERCIAL

## 2022-08-30 VITALS
RESPIRATION RATE: 17 BRPM | BODY MASS INDEX: 26.11 KG/M2 | HEART RATE: 68 BPM | DIASTOLIC BLOOD PRESSURE: 109 MMHG | TEMPERATURE: 97.9 F | OXYGEN SATURATION: 96 % | HEIGHT: 66 IN | SYSTOLIC BLOOD PRESSURE: 144 MMHG | WEIGHT: 162.48 LBS

## 2022-08-30 DIAGNOSIS — R59.0 ANTERIOR CERVICAL ADENOPATHY: ICD-10-CM

## 2022-08-30 PROCEDURE — 700117 HCHG RX CONTRAST REV CODE 255: Performed by: NURSE PRACTITIONER

## 2022-08-30 PROCEDURE — 99203 OFFICE O/P NEW LOW 30 MIN: CPT | Performed by: NURSE PRACTITIONER

## 2022-08-30 PROCEDURE — 70491 CT SOFT TISSUE NECK W/DYE: CPT

## 2022-08-30 PROCEDURE — 99212 OFFICE O/P EST SF 10 MIN: CPT | Performed by: NURSE PRACTITIONER

## 2022-08-30 RX ADMIN — IOHEXOL 80 ML: 350 INJECTION, SOLUTION INTRAVENOUS at 15:44

## 2022-08-30 ASSESSMENT — FIBROSIS 4 INDEX: FIB4 SCORE: 1.09

## 2022-08-30 ASSESSMENT — ENCOUNTER SYMPTOMS
WEIGHT LOSS: 0
MYALGIAS: 0
FEVER: 0
HEADACHES: 0
SORE THROAT: 0
CHILLS: 0
DIARRHEA: 0
INSOMNIA: 1
DIAPHORESIS: 0
PALPITATIONS: 0
VOMITING: 0
DIZZINESS: 1
NAUSEA: 0
WHEEZING: 1
CONSTIPATION: 0

## 2022-08-30 ASSESSMENT — PAIN SCALES - GENERAL: PAINLEVEL: 2=MINIMAL-SLIGHT

## 2022-08-30 NOTE — ADDENDUM NOTE
Encounter addended by: TRACY EscobarPREBECCA on: 8/30/2022 10:11 AM   Actions taken: SmartForm saved, Clinical Note Signed

## 2022-08-30 NOTE — TELEPHONE ENCOUNTER
Attempted to contact patient this evening with regards to his CT results.  Unfortunately patient's mailbox is not set up and unable to leave a voice message.  Will attempt to contact patient tomorrow to discuss findings.

## 2022-08-30 NOTE — PROGRESS NOTES
"Azeem Mejia is a 51 y.o. male who presents as a New Patient (Antieror Cervical Lymph)          HPI    Patient referred to me, Intake Oncology Coordinator by his PCP Dr. Jerez for cervical adenopathy.  Patient is unaccompanied for today's visit.     Patient presented to his PCP on 8/17/2022.  He stated he noticed a lump under his chin approximately 2-3 weeks ago.  It was mildly tender to touch.  He stated that he did not feel as if he was having any infectious symptoms but then noted that he has had some sinusitis issues.  He stated at the time that he felt that he felt \"flush\" and was concerned that maybe he had the flu.  Never really had any significant symptoms with the exception of sinusitis symptoms eventually.  He does believe that the lump is getting a little smaller.  He was prescribed Augmentin by his primary care provider but patient was unaware of this and has not taken this.  He denies any dysphagia.  Patient denies any B symptoms including unplanned weight loss or drenching night sweats.  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  Patient had labs completed on 8/17/2022.  There is no evidence of leukocytosis or anemia.  She also did inflammatory markers which were all negative.  Patient was sent for an ultrasound completed on 8/17/2022 which showed a 3.1 x 1.7 x 2.1 cm lymph node on the left.  There are some benign-appearing mildly enlarged lymph nodes on the right.  Patient has been scheduled for a CT neck but that has not been completed yet.  I personally reviewed the labs and ultrasound report in detail with the patient today.    Please see past medical and surgical history below.    Patient is a never smoker.    Patient denies a family history of cancer.      Allergies   Allergen Reactions    Seasonal      Current Outpatient Medications on File Prior to Encounter   Medication Sig Dispense Refill    Loteprednol Etabonate (LOTEMAX " SM) 0.38 % Gel       sildenafil citrate (VIAGRA) 100 MG tablet Take 1 Tablet by mouth 1 time a day as needed for Erectile Dysfunction. 10 Tablet 3    albuterol 108 (90 Base) MCG/ACT Aero Soln inhalation aerosol Inhale 2 Puffs every 6 hours as needed for Shortness of Breath. 8.5 g 1    meloxicam (MOBIC) 15 MG tablet Take 1 Tablet by mouth every day. 60 Tablet 1     No current facility-administered medications on file prior to encounter.     Past Medical History:   Diagnosis Date    Asthma     Hyperlipidemia      Past Surgical History:   Procedure Laterality Date    NASAL POLYPECTOMY Right      Family History   Problem Relation Age of Onset    Diabetes Father     Asthma Sister     Hyperlipidemia Neg Hx     Hypertension Neg Hx     Cancer Neg Hx      Social History     Socioeconomic History    Marital status: Single     Spouse name: Not on file    Number of children: Not on file    Years of education: Not on file    Highest education level: Not on file   Occupational History    Not on file   Tobacco Use    Smoking status: Never    Smokeless tobacco: Never   Vaping Use    Vaping Use: Never used   Substance and Sexual Activity    Alcohol use: Yes     Comment: very rare    Drug use: Not Currently    Sexual activity: Not on file   Other Topics Concern    Not on file   Social History Narrative    Works at [employer]     Social Determinants of Health     Financial Resource Strain: Not on file   Food Insecurity: Not on file   Transportation Needs: Not on file   Physical Activity: Not on file   Stress: Not on file   Social Connections: Not on file   Intimate Partner Violence: Not on file   Housing Stability: Not on file         Review of Systems   Constitutional:  Positive for malaise/fatigue (r/t 12 hour night shifts). Negative for chills, diaphoresis, fever and weight loss.   HENT:  Positive for congestion (sinusitis). Negative for sore throat.    Respiratory:  Positive for wheezing.    Cardiovascular:  Negative for chest pain and  "palpitations.   Gastrointestinal:  Negative for constipation, diarrhea, nausea and vomiting.   Genitourinary:  Negative for dysuria.   Musculoskeletal:  Negative for myalgias.   Skin:  Negative for itching and rash.   Neurological:  Positive for dizziness (r/t lack of sleep). Negative for headaches.   Psychiatric/Behavioral:  The patient has insomnia (r/t night shift work).             Objective     BP (!) 144/109   Pulse 68   Temp 36.6 °C (97.9 °F) (Temporal)   Resp 17   Ht 1.676 m (5' 6\")   Wt 73.7 kg (162 lb 7.7 oz)   SpO2 96%   BMI 26.22 kg/m²      Physical Exam  Vitals reviewed.   Constitutional:       General: He is not in acute distress.     Appearance: Normal appearance. He is well-developed. He is not diaphoretic.   HENT:      Head: Normocephalic and atraumatic.      Mouth/Throat:      Mouth: Mucous membranes are moist.      Pharynx: Oropharynx is clear. No oropharyngeal exudate.   Eyes:      General: No scleral icterus.        Right eye: No discharge.         Left eye: No discharge.      Conjunctiva/sclera: Conjunctivae normal.      Pupils: Pupils are equal, round, and reactive to light.   Neck:      Thyroid: No thyromegaly.   Cardiovascular:      Rate and Rhythm: Normal rate and regular rhythm.      Pulses: Normal pulses.      Heart sounds: Normal heart sounds. No murmur heard.    No friction rub. No gallop.   Pulmonary:      Effort: Pulmonary effort is normal. No respiratory distress.      Breath sounds: Normal breath sounds. No wheezing.   Abdominal:      General: Bowel sounds are normal. There is no distension.      Palpations: Abdomen is soft.      Tenderness: There is no abdominal tenderness.   Musculoskeletal:         General: No tenderness. Normal range of motion.      Cervical back: Normal range of motion and neck supple. No muscular tenderness.   Lymphadenopathy:      Head:      Right side of head: Submandibular adenopathy present. No submental, tonsillar, preauricular, posterior auricular " or occipital adenopathy.      Left side of head: No submental, submandibular, tonsillar, preauricular, posterior auricular or occipital adenopathy.      Cervical: Cervical adenopathy present.      Right cervical: Superficial cervical adenopathy present. No deep or posterior cervical adenopathy.     Left cervical: No superficial, deep or posterior cervical adenopathy.      Upper Body:      Right upper body: No supraclavicular or axillary adenopathy.      Left upper body: No supraclavicular or axillary adenopathy.   Skin:     General: Skin is warm and dry.      Coloration: Skin is not pale.      Findings: No erythema or rash.   Neurological:      Mental Status: He is alert and oriented to person, place, and time.   Psychiatric:         Mood and Affect: Mood normal.         Behavior: Behavior normal.            US-SOFT TISSUES OF HEAD - NECK    Result Date: 8/17/2022 8/17/2022 4:15 PM HISTORY/REASON FOR EXAM:  Mass/Lump TECHNIQUE/EXAM DESCRIPTION: Ultrasound of the soft tissues of the head and neck. COMPARISON:  None FINDINGS: Focused ultrasound of the area of concern left neck demonstrates an enlarged hypoechoic and hypervascular lymph node measuring 3.1 x 1.7 x 2.1 cm. This corresponds to the palpable abnormality. A lymph node with an echogenic hilum in the right neck measures 2.6 x 0.9 x 1.2 cm.     1.  Pathologic lymph node corresponds to the palpable abnormality in the left neck measuring 3.1 x 1.7 x 2.1 cm. Further evaluation with CT soft tissue neck is recommended as this could be metastatic 2.  Benign-appearing mildly enlarged lymph node is seen in the right neck.        Latest Reference Range & Units 8/17/22 10:26   WBC 4.8 - 10.8 K/uL 7.4   RBC 4.70 - 6.10 M/uL 4.79   Hemoglobin 14.0 - 18.0 g/dL 15.4   Hematocrit 42.0 - 52.0 % 46.0   MCV 81.4 - 97.8 fL 96.0   MCH 27.0 - 33.0 pg 32.2   MCHC 33.7 - 35.3 g/dL 33.5 (L)   RDW 35.9 - 50.0 fL 39.8   Platelet Count 164 - 446 K/uL 290   MPV 9.0 - 12.9 fL 9.6    Neutrophils-Polys 44.00 - 72.00 % 58.30   Neutrophils (Absolute) 1.82 - 7.42 K/uL 4.29   Lymphocytes 22.00 - 41.00 % 26.70   Lymphs (Absolute) 1.00 - 4.80 K/uL 1.96   Monocytes 0.00 - 13.40 % 6.50   Monos (Absolute) 0.00 - 0.85 K/uL 0.48   Eosinophils 0.00 - 6.90 % 7.20 (H)   Eos (Absolute) 0.00 - 0.51 K/uL 0.53 (H)   Basophils 0.00 - 1.80 % 1.00   Baso (Absolute) 0.00 - 0.12 K/uL 0.07   Immature Granulocytes 0.00 - 0.90 % 0.30   Immature Granulocytes (abs) 0.00 - 0.11 K/uL 0.02   Nucleated RBC /100 WBC 0.00   NRBC (Absolute) K/uL 0.00   Sed Rate Westergren 0 - 20 mm/hour 3   Uric Acid 2.5 - 8.3 mg/dL 6.5   LDH Total 107 - 266 U/L 191               Assessment & Plan       1. Anterior cervical adenopathy  CT-SOFT TISSUE NECK WITH              Patient with anterior cervical adenopathy noted on physical examination today.  It is hard and immobile.  CT neck has been ordered by PCP and scheduled for tomorrow.  However this will require with contrast therefore I have reordered the scan to be completed with contrast and he will still have this tomorrow as scheduled.  Did discuss with patient the possibility of biopsy based on CT findings and I will contact him over the phone once the CT has been completed to discuss the results and further plan of care.  Patient verbalized understanding is in agreement the plan.    Patient's blood pressure was elevated today's appointment.  He stated he worked his night shift and took a nap before coming to his appointment today.  He said he has been nervous and anxious about upcoming appointment.  Will defer to PCP for further monitoring on his blood pressure as he does not take blood pressure medication at this time.      Please note that this dictation was created using voice recognition software. I have made every reasonable attempt to correct obvious errors, but I expect that there are errors of grammar and possibly content that I did not discover before finalizing the note.

## 2022-08-31 ENCOUNTER — TELEPHONE (OUTPATIENT)
Dept: HEMATOLOGY ONCOLOGY | Facility: MEDICAL CENTER | Age: 52
End: 2022-08-31
Payer: COMMERCIAL

## 2022-08-31 ENCOUNTER — APPOINTMENT (OUTPATIENT)
Dept: RADIOLOGY | Facility: MEDICAL CENTER | Age: 52
End: 2022-08-31
Attending: INTERNAL MEDICINE
Payer: COMMERCIAL

## 2022-08-31 ENCOUNTER — PATIENT MESSAGE (OUTPATIENT)
Dept: HEMATOLOGY ONCOLOGY | Facility: MEDICAL CENTER | Age: 52
End: 2022-08-31
Payer: COMMERCIAL

## 2022-08-31 DIAGNOSIS — R59.0 SUBMANDIBULAR LYMPHADENOPATHY: ICD-10-CM

## 2022-08-31 DIAGNOSIS — R59.0 ANTERIOR CERVICAL ADENOPATHY: ICD-10-CM

## 2022-08-31 NOTE — TELEPHONE ENCOUNTER
Patient returning phone call to discuss CT results, he stated he was in a doctors appointment and couldn't answer her phone call at the time. I let her know she will give him a call back when she is available to do so.     Patient agreed and verbalized understanding.

## 2022-08-31 NOTE — TELEPHONE ENCOUNTER
Called patient to set up an appt with Peggy on 9/12 in the PM, to go over BX results. Patient's mailbox is not set up, was unable to leave VM.

## 2022-09-01 NOTE — TELEPHONE ENCOUNTER
2nd attempt:  Called patient to set up an appt with Peggy on 9/12 in the PM, to go over BX results. Patient's mailbox is not set up, was unable to leave VM.

## 2022-09-07 ENCOUNTER — HOSPITAL ENCOUNTER (OUTPATIENT)
Dept: RADIOLOGY | Facility: MEDICAL CENTER | Age: 52
End: 2022-09-07
Attending: NURSE PRACTITIONER
Payer: COMMERCIAL

## 2022-09-07 DIAGNOSIS — R59.0 SUBMANDIBULAR LYMPHADENOPATHY: ICD-10-CM

## 2022-09-07 LAB — PATHOLOGY CONSULT NOTE: NORMAL

## 2022-09-07 PROCEDURE — 88305 TISSUE EXAM BY PATHOLOGIST: CPT

## 2022-09-07 PROCEDURE — 700101 HCHG RX REV CODE 250

## 2022-09-07 PROCEDURE — 88342 IMHCHEM/IMCYTCHM 1ST ANTB: CPT

## 2022-09-07 PROCEDURE — 88341 IMHCHEM/IMCYTCHM EA ADD ANTB: CPT | Mod: 91

## 2022-09-07 PROCEDURE — 88377 M/PHMTRC ALYS ISHQUANT/SEMIQ: CPT

## 2022-09-07 PROCEDURE — 88184 FLOWCYTOMETRY/ TC 1 MARKER: CPT

## 2022-09-07 PROCEDURE — 88360 TUMOR IMMUNOHISTOCHEM/MANUAL: CPT

## 2022-09-07 PROCEDURE — 76942 ECHO GUIDE FOR BIOPSY: CPT

## 2022-09-07 PROCEDURE — 88185 FLOWCYTOMETRY/TC ADD-ON: CPT

## 2022-09-07 RX ORDER — LIDOCAINE HYDROCHLORIDE 10 MG/ML
INJECTION, SOLUTION INFILTRATION; PERINEURAL
Status: DISCONTINUED
Start: 2022-09-07 | End: 2022-09-07

## 2022-09-07 RX ADMIN — LIDOCAINE HYDROCHLORIDE: 10 INJECTION, SOLUTION INFILTRATION; PERINEURAL at 09:42

## 2022-09-07 NOTE — PROGRESS NOTES
Outpatient Interventional Radiology RN Note:    US-guided Left Submandibular Lymph Node Biopsy completed by Dr. Hughes; Procedure explained by MD prior to start and consent obtained, all questions/concerns addressed; Site marked and visualized; No procedural sedation required.    Procedure completed via Left anterior neck; 1 jar of Formalin x 3 cores; 1 RPMI x 1 core obtained and sent to pathology for analysis; Puncture site covered with bandaid upon completion.    Patient tolerated the procedure well; Provided with appropriate discharge education, all questions/concerns addressed; Patient discharged home.

## 2022-09-09 ENCOUNTER — PATIENT MESSAGE (OUTPATIENT)
Dept: HEMATOLOGY ONCOLOGY | Facility: MEDICAL CENTER | Age: 52
End: 2022-09-09
Payer: COMMERCIAL

## 2022-09-09 NOTE — PROGRESS NOTES
Sent patient a [employer] message to call our office so we can get him scheduled for a follow-up visit.  We have attempted multiple times, at least 4 times this past week to schedule a follow-up visit to go over the biopsy results.  He does not have a voicemail set up and he does not answer his phone.  I requested he contact the office.  If he calls please schedule him either Monday, 9/12 after 3PM or Tuesday, 9/13.

## 2022-09-12 ENCOUNTER — APPOINTMENT (OUTPATIENT)
Dept: HEMATOLOGY ONCOLOGY | Facility: MEDICAL CENTER | Age: 52
End: 2022-09-12
Payer: COMMERCIAL

## 2022-09-15 ENCOUNTER — APPOINTMENT (OUTPATIENT)
Dept: HEMATOLOGY ONCOLOGY | Facility: MEDICAL CENTER | Age: 52
End: 2022-09-15
Payer: COMMERCIAL

## 2022-09-19 ENCOUNTER — HOSPITAL ENCOUNTER (OUTPATIENT)
Dept: HEMATOLOGY ONCOLOGY | Facility: MEDICAL CENTER | Age: 52
End: 2022-09-19
Attending: NURSE PRACTITIONER
Payer: COMMERCIAL

## 2022-09-19 VITALS
SYSTOLIC BLOOD PRESSURE: 142 MMHG | OXYGEN SATURATION: 93 % | DIASTOLIC BLOOD PRESSURE: 104 MMHG | BODY MASS INDEX: 26.8 KG/M2 | RESPIRATION RATE: 18 BRPM | HEART RATE: 77 BPM | TEMPERATURE: 97.9 F | HEIGHT: 66 IN | WEIGHT: 166.78 LBS

## 2022-09-19 DIAGNOSIS — R59.0 SUBMANDIBULAR LYMPHADENOPATHY: ICD-10-CM

## 2022-09-19 PROCEDURE — 99213 OFFICE O/P EST LOW 20 MIN: CPT | Performed by: NURSE PRACTITIONER

## 2022-09-19 PROCEDURE — 99212 OFFICE O/P EST SF 10 MIN: CPT | Performed by: NURSE PRACTITIONER

## 2022-09-19 ASSESSMENT — ENCOUNTER SYMPTOMS
DIAPHORESIS: 0
FEVER: 0
CHILLS: 0
SINUS PAIN: 1
WEIGHT LOSS: 0

## 2022-09-19 ASSESSMENT — FIBROSIS 4 INDEX: FIB4 SCORE: 1.11

## 2022-09-19 ASSESSMENT — PAIN SCALES - GENERAL: PAINLEVEL: NO PAIN

## 2022-09-19 NOTE — ADDENDUM NOTE
Encounter addended by: Kate Simpson, Med Ass't on: 9/19/2022 10:42 AM   Actions taken: Charge Capture section accepted

## 2022-09-19 NOTE — PROGRESS NOTES
James Mejia is a 52 y.o. male who presents with Other (IC EST/ Biopsy Results)          HPI    Patient seen today in follow-up for biopsy results.  He presents unaccompanied for today's visit.    Patient recently seen on 8/30/2022 for submandibular lymphadenopathy. Patient presented to his PCP on 8/17/2022.  He stated he noticed a lump under his chin approximately 2-3 weeks ago. Patient had labs completed on 8/17/2022.  There was no evidence of leukocytosis or anemia.  PCP also did inflammatory markers which were all negative.  Patient was sent for an ultrasound completed on 8/17/2022 which showed a 3.1 x 1.7 x 2.1 cm lymph node on the left.  There are some benign-appearing mildly enlarged lymph nodes on the right.  Based on those findings I did request patient proceed with a CT with contrast.  CT with contrast completed on 8/31/2022 showed a 2.2 x 2.5 x 3.4 cm mass in the left 7 to biliary region.  Reading radiologist stated that this is likely an abnormal enlarged lymph node and recommendation for biopsy.  Patient underwent biopsy on 9/7/2022 of the left submandibular lymph node, 1 jar of Formalin x 3 cores; 1 RPMI x 1 core obtained and sent to pathology for analysis.  Patient presents today to review results.    He continues to feel well.  He stated he tolerated the biopsy well.  He denies any B symptoms.  He continues to have some sinus issues.  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    Pathology report unfortunately is still pending.  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  Flow cytometry is consistent with a CD10 positive B-cell lymphoproliferative disorder as well.  FISH analysis was negative for CCND1-IGH, BCL2, BCL6, and MYC.  I did discuss with patient the concerning findings however there is no confirmation of malignancy at this time.  We did discuss the possibility of  "requesting an excisional biopsy if unable to have a complete answer with core biopsies.  He is scheduled to be seen by ENT on 9/27/22, Dr. Lucio.  If needed we may request excisional biopsy at that time.    Allergies   Allergen Reactions    Seasonal      Current Outpatient Medications on File Prior to Encounter   Medication Sig Dispense Refill    Loteprednol Etabonate (LOTEMAX SM) 0.38 % Gel       meloxicam (MOBIC) 15 MG tablet Take 1 Tablet by mouth every day. 60 Tablet 1    sildenafil citrate (VIAGRA) 100 MG tablet Take 1 Tablet by mouth 1 time a day as needed for Erectile Dysfunction. 10 Tablet 3    albuterol 108 (90 Base) MCG/ACT Aero Soln inhalation aerosol Inhale 2 Puffs every 6 hours as needed for Shortness of Breath. 8.5 g 1     No current facility-administered medications on file prior to encounter.       Review of Systems   Constitutional:  Positive for malaise/fatigue (related to night shift work). Negative for chills, diaphoresis, fever and weight loss.   HENT:  Positive for sinus pain (patient with sinusitis).             Objective     BP (!) 142/104   Pulse 77   Temp 36.6 °C (97.9 °F) (Temporal)   Resp 18   Ht 1.676 m (5' 6\")   Wt 75.7 kg (166 lb 12.5 oz)   SpO2 93%   BMI 26.92 kg/m²      Physical Exam  Vitals reviewed.   Constitutional:       General: He is not in acute distress.     Appearance: Normal appearance. He is not diaphoretic.   HENT:      Head: Normocephalic and atraumatic.   Cardiovascular:      Rate and Rhythm: Normal rate and regular rhythm.      Heart sounds: Normal heart sounds. No murmur heard.    No friction rub. No gallop.   Pulmonary:      Effort: Pulmonary effort is normal. No respiratory distress.      Breath sounds: Normal breath sounds. No wheezing.   Skin:     General: Skin is warm and dry.   Neurological:      Mental Status: He is alert and oriented to person, place, and time.   Psychiatric:         Mood and Affect: Mood normal.         Behavior: Behavior normal. "             CT-SOFT TISSUE NECK WITH    Result Date: 8/30/2022 8/30/2022 3:30 PM HISTORY/REASON FOR EXAM:  Lymphadenopathy, neck; left cervical adenopathy on U/S; left cervical adenopathy on U/S. TECHNIQUE/EXAM DESCRIPTION AND NUMBER OF VIEWS:  CT soft tissue neck with contrast. The study was performed on a helical multidetector CT scanner. Contiguous thin section helical images were obtained of the neck from the skull base through the thoracic inlet. 80 mL of Omnipaque 350 nonionic contrast was injected intravenously. Low dose optimization technique was utilized for this CT exam including automated exposure control and adjustment of the mA and/or kV according to patient size. COMPARISON: Ultrasound 8/17/2022 FINDINGS: BRAIN: Visualized portions of the brain are normal in appearance. TEMPORAL bones: The mastoid air cells and middle ear are clear. PARANASAL SINUSES: There is trace LEFT maxillary sinus fluid and mucosal thickening. There has been BILATERAL maxillary antrostomy. CERVICAL spine: There is no significant cervical spine abnormality. NODES: There is a 2.3 x 2.5 x 3.4 cm ovoid mass adjacent to but not appearing to arise from the LEFT submandibular gland. There is additional are mildly prominent LEFT anterior neck lymph nodes, none of which are enlarged by pathologic size criteria. SALIVARY and THYROID gland: The parotid, submandibular, and thyroid gland are normal in appearance. AIRWAY: The airway appears patent. MEDIASTINUM: The superior mediastinum is normal in appearance. LUNGS: The visualized portions of lungs are clear.     1.  2.2 x 2.5 x 3.4 cm mass in the LEFT submandibular region adjacent to but not apparently arising from the LEFT submandibular gland. This is probably an abnormally enlarged lymph node. Suggest biopsy. 2.  Additional prominent LEFT neck lymph nodes not enlarged by size criterion                   Assessment & Plan       1. Submandibular lymphadenopathy  [id number] BASE TO  MID-THIGH           1.  Patient pathology from the left submandibular core biopsy is still pending.  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  Discussed with patient the possible concerns however excisional biopsy may be required for further tissue diagnosis.  He is already scheduled for ENT and will await results before determining whether ENT and/or excisional biopsy is needed.    In preparation for possible malignancy diagnosis I have requested a PET/CT currently scheduled for 10/3/2022.  If needed this will be scheduled and ready to go, and if not needed we can cancel.    Patient is interested in following up in the office again once final pathology has been confirmed.  We are tentatively scheduled for this Thursday, 9/22/2022.    2.  Patient has had elevated blood pressure last 2 office visits.  He stated that he has been up for over 24 hours and attributes this to his schedule.  We did discuss the possibility of further work-up by his PCP with regards to his blood pressure as he does work night shift hours consistently.  Will defer to PCP with regards to his blood pressure.       Please note that this dictation was created using voice recognition software. I have made every reasonable attempt to correct obvious errors, but I expect that there are errors of grammar and possibly content that I did not discover before finalizing the note.

## 2022-09-22 ENCOUNTER — HOSPITAL ENCOUNTER (OUTPATIENT)
Dept: HEMATOLOGY ONCOLOGY | Facility: MEDICAL CENTER | Age: 52
End: 2022-09-22
Attending: NURSE PRACTITIONER
Payer: COMMERCIAL

## 2022-09-22 ENCOUNTER — HOSPITAL ENCOUNTER (OUTPATIENT)
Dept: LAB | Facility: MEDICAL CENTER | Age: 52
End: 2022-09-22
Attending: NURSE PRACTITIONER
Payer: COMMERCIAL

## 2022-09-22 VITALS
WEIGHT: 168.32 LBS | OXYGEN SATURATION: 96 % | BODY MASS INDEX: 27.05 KG/M2 | HEART RATE: 80 BPM | SYSTOLIC BLOOD PRESSURE: 131 MMHG | RESPIRATION RATE: 18 BRPM | TEMPERATURE: 98.1 F | HEIGHT: 66 IN | DIASTOLIC BLOOD PRESSURE: 95 MMHG

## 2022-09-22 DIAGNOSIS — C83.31 DIFFUSE LARGE B-CELL LYMPHOMA OF LYMPH NODES OF NECK (HCC): ICD-10-CM

## 2022-09-22 LAB
ALBUMIN SERPL BCP-MCNC: 4.3 G/DL (ref 3.2–4.9)
ALBUMIN/GLOB SERPL: 1.5 G/DL
ALP SERPL-CCNC: 86 U/L (ref 30–99)
ALT SERPL-CCNC: 23 U/L (ref 2–50)
ANION GAP SERPL CALC-SCNC: 11 MMOL/L (ref 7–16)
AST SERPL-CCNC: 28 U/L (ref 12–45)
BASOPHILS # BLD AUTO: 0.9 % (ref 0–1.8)
BASOPHILS # BLD: 0.07 K/UL (ref 0–0.12)
BILIRUB SERPL-MCNC: 0.4 MG/DL (ref 0.1–1.5)
BUN SERPL-MCNC: 14 MG/DL (ref 8–22)
CALCIUM SERPL-MCNC: 8.8 MG/DL (ref 8.5–10.5)
CHLORIDE SERPL-SCNC: 101 MMOL/L (ref 96–112)
CO2 SERPL-SCNC: 24 MMOL/L (ref 20–33)
CREAT SERPL-MCNC: 0.67 MG/DL (ref 0.5–1.4)
EOSINOPHIL # BLD AUTO: 0.34 K/UL (ref 0–0.51)
EOSINOPHIL NFR BLD: 4.4 % (ref 0–6.9)
ERYTHROCYTE [DISTWIDTH] IN BLOOD BY AUTOMATED COUNT: 39.2 FL (ref 35.9–50)
GFR SERPLBLD CREATININE-BSD FMLA CKD-EPI: 112 ML/MIN/1.73 M 2
GLOBULIN SER CALC-MCNC: 2.8 G/DL (ref 1.9–3.5)
GLUCOSE SERPL-MCNC: 101 MG/DL (ref 65–99)
HBV CORE IGM SER QL: NORMAL
HBV SURFACE AG SER QL: NORMAL
HCT VFR BLD AUTO: 43.4 % (ref 42–52)
HGB BLD-MCNC: 14.8 G/DL (ref 14–18)
IMM GRANULOCYTES # BLD AUTO: 0.03 K/UL (ref 0–0.11)
IMM GRANULOCYTES NFR BLD AUTO: 0.4 % (ref 0–0.9)
LDH SERPL L TO P-CCNC: 198 U/L (ref 107–266)
LYMPHOCYTES # BLD AUTO: 3.06 K/UL (ref 1–4.8)
LYMPHOCYTES NFR BLD: 39.6 % (ref 22–41)
MCH RBC QN AUTO: 32 PG (ref 27–33)
MCHC RBC AUTO-ENTMCNC: 34.1 G/DL (ref 33.7–35.3)
MCV RBC AUTO: 93.9 FL (ref 81.4–97.8)
MONOCYTES # BLD AUTO: 0.62 K/UL (ref 0–0.85)
MONOCYTES NFR BLD AUTO: 8 % (ref 0–13.4)
NEUTROPHILS # BLD AUTO: 3.61 K/UL (ref 1.82–7.42)
NEUTROPHILS NFR BLD: 46.7 % (ref 44–72)
NRBC # BLD AUTO: 0 K/UL
NRBC BLD-RTO: 0 /100 WBC
PHOSPHATE SERPL-MCNC: 3.4 MG/DL (ref 2.5–4.5)
PLATELET # BLD AUTO: 284 K/UL (ref 164–446)
PMV BLD AUTO: 8.9 FL (ref 9–12.9)
POTASSIUM SERPL-SCNC: 3.8 MMOL/L (ref 3.6–5.5)
PROT SERPL-MCNC: 7.1 G/DL (ref 6–8.2)
RBC # BLD AUTO: 4.62 M/UL (ref 4.7–6.1)
SODIUM SERPL-SCNC: 136 MMOL/L (ref 135–145)
URATE SERPL-MCNC: 6.2 MG/DL (ref 2.5–8.3)
WBC # BLD AUTO: 7.7 K/UL (ref 4.8–10.8)

## 2022-09-22 PROCEDURE — 87522 HEPATITIS C REVRS TRNSCRPJ: CPT

## 2022-09-22 PROCEDURE — 83615 LACTATE (LD) (LDH) ENZYME: CPT

## 2022-09-22 PROCEDURE — 84550 ASSAY OF BLOOD/URIC ACID: CPT

## 2022-09-22 PROCEDURE — 99212 OFFICE O/P EST SF 10 MIN: CPT | Performed by: NURSE PRACTITIONER

## 2022-09-22 PROCEDURE — 84100 ASSAY OF PHOSPHORUS: CPT

## 2022-09-22 PROCEDURE — 99214 OFFICE O/P EST MOD 30 MIN: CPT | Performed by: NURSE PRACTITIONER

## 2022-09-22 PROCEDURE — 80053 COMPREHEN METABOLIC PANEL: CPT

## 2022-09-22 PROCEDURE — 36415 COLL VENOUS BLD VENIPUNCTURE: CPT

## 2022-09-22 PROCEDURE — 82232 ASSAY OF BETA-2 PROTEIN: CPT

## 2022-09-22 PROCEDURE — 85025 COMPLETE CBC W/AUTO DIFF WBC: CPT

## 2022-09-22 PROCEDURE — 87340 HEPATITIS B SURFACE AG IA: CPT

## 2022-09-22 PROCEDURE — 86705 HEP B CORE ANTIBODY IGM: CPT

## 2022-09-22 ASSESSMENT — ENCOUNTER SYMPTOMS
WEIGHT LOSS: 0
FEVER: 0
CHILLS: 0
DIAPHORESIS: 0

## 2022-09-22 ASSESSMENT — FIBROSIS 4 INDEX: FIB4 SCORE: 1.11

## 2022-09-22 NOTE — ADDENDUM NOTE
Encounter addended by: Kate Simpson, Med Ass't on: 9/22/2022 9:23 AM   Actions taken: Charge Capture section accepted

## 2022-09-22 NOTE — PROGRESS NOTES
James Mejia is a 52 y.o. male who presents with Other (IC Est/BX Results)        HPI    Patient seen today in follow-up for biopsy results.  He presents unaccompanied for today's visit.     Patient recently seen on 8/30/2022 for submandibular lymphadenopathy. Patient presented to his PCP on 8/17/2022.  He stated he noticed a lump under his chin approximately 2-3 weeks ago. Patient had labs completed on 8/17/2022.  There was no evidence of leukocytosis or anemia.  PCP also did inflammatory markers which were all negative.  Patient was sent for an ultrasound completed on 8/17/2022 which showed a 3.1 x 1.7 x 2.1 cm lymph node on the left.  There are some benign-appearing mildly enlarged lymph nodes on the right.  Based on those findings I did request patient proceed with a CT with contrast.  CT with contrast completed on 8/31/2022 showed a 2.2 x 2.5 x 3.4 cm mass in the left 7 to biliary region.  Reading radiologist stated that this is likely an abnormal enlarged lymph node and recommendation for biopsy.  Patient underwent biopsy on 9/7/2022 of the left submandibular lymph node, 1 jar of Formalin x 3 cores; 1 RPMI x 1 core obtained and sent to pathology for analysis.    Initially preliminary path showed a B-cell lymphoproliferative disorder with a dim CD10 expression.  Flow cytometry is consistent with a CD10 positive B-cell lymphoproliferative disorder as well.  FISH analysis was negative for CCND1-IGH, BCL2, BCL6, and MYC. Patient returns today for the final path results.   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  C-MYC IHC is negative which shows that this is not a double hit expresser.  I did personally review the path report in detail with the patient today.    Clinically continues to be stable.  Denies any B symptoms.    Allergies   Allergen Reactions    Seasonal      Current Outpatient Medications on File Prior to Encounter   Medication Sig  "Dispense Refill    Loteprednol Etabonate (LOTEMAX SM) 0.38 % Gel       meloxicam (MOBIC) 15 MG tablet Take 1 Tablet by mouth every day. 60 Tablet 1    sildenafil citrate (VIAGRA) 100 MG tablet Take 1 Tablet by mouth 1 time a day as needed for Erectile Dysfunction. 10 Tablet 3    albuterol 108 (90 Base) MCG/ACT Aero Soln inhalation aerosol Inhale 2 Puffs every 6 hours as needed for Shortness of Breath. 8.5 g 1     No current facility-administered medications on file prior to encounter.         Review of Systems   Constitutional:  Negative for chills, diaphoresis, fever, malaise/fatigue and weight loss.            Objective     BP (!) 131/95   Pulse 80   Temp 36.7 °C (98.1 °F) (Temporal)   Resp 18   Ht 1.676 m (5' 6\")   Wt 76.4 kg (168 lb 5.1 oz)   SpO2 96%   BMI 27.17 kg/m²      Physical Exam  Vitals reviewed.   Constitutional:       Appearance: Normal appearance.   Neurological:      Mental Status: He is alert and oriented to person, place, and time.   Psychiatric:         Mood and Affect: Mood normal.         Behavior: Behavior normal.            FINAL DIAGNOSIS:     A. Left submandibular node core biopsy:          Diffuse large B cell lymphoma, GCB type.          C-MYC IHC is negative which shows this is not a double           expressor.     Synoptic Summary for Non-Hodgkin Lymphoma/Lymphoid Neoplasms:          -Specimen: Lymph node        -Procedure: Core biopsy        -Tumor site: Left submandibular        -Histologic type: Diffuse large B cell lymphoma, GCB        -Immunophenotyping:            Flow cytometry: Performed; please refer to microscopic             description for details.            Immunohistochemistry: Performed; please refer to microscopic             description for details.        -Cytogenetic Studies: Not performed.        -Molecular Genetic Studies: FISH is negative for Bcl-2, Bcl-6,         C-MYC and t(11;14)- negative for double/triple hit.            Comment: One area of the core " shows lower grade cells that have           a lower Ki-67 of   10% and may either represent either only a           partially involved node or an area of lower grade lymphoma such           as follicular lymphoma.  If this distinction is clinically           important than an excision of the whole node is necessary for           evaluation.  This case has been reviewed by a second           pathologist, Dr. Villanueva, who concurs with the diagnosis.                Assessment & Plan       1. Diffuse large B-cell lymphoma of lymph nodes of neck (HCC)  BETA-2-MICROGLOBULIN    CBC WITH DIFFERENTIAL    Comp Metabolic Panel    EC-ECHOCARDIOGRAM COMPLETE W/O CONT    HEP B CORE AB IGM    HEP B SURFACE ANTIGEN    HEPATITIS C RNA QUANT.    LDH    PHOSPHORUS    URIC ACID    Referral to Hematology Oncology           Patient final diagnosis is consistent with a diffuse large B-cell lymphoma from a submandibular lymph node biopsy.  Based on this findings I will proceed with full work-up for his new diagnosis as follows:    1.  PET/CT scheduled on 10/3/2022.  2.  Bone Marrow biopsy ordered.  3.  In preparation for upcoming treatment I have requested echocardiogram.  4.  Also in preparation for upcoming treatment I have requested multiple labs.  Did confirm that patient has had HIV blood test completed which was found to be negative in August 2022.  5.  Referral to medical oncology.  Patient is scheduled to establish care with medical oncologist on 10/4/2022 to discuss all staging work-up, and plan of care for his new diagnosis of non-Hodgkin's lymphoma.    I discussed this plan of care in detail with the patient today.  He did verbalize understanding and is in agreement with the plan.  No further follow-up needed with [facility].      Please note that this dictation was created using voice recognition software. I have made every reasonable attempt to correct obvious errors, but I expect that there are errors of grammar and possibly content  that I did not discover before finalizing the note.

## 2022-09-26 ENCOUNTER — PRE-ADMISSION TESTING (OUTPATIENT)
Dept: ADMISSIONS | Facility: MEDICAL CENTER | Age: 52
End: 2022-09-26
Attending: NURSE PRACTITIONER
Payer: COMMERCIAL

## 2022-09-26 LAB
HCV RNA SERPL NAA+PROBE-ACNC: NOT DETECTED IU/ML
HCV RNA SERPL NAA+PROBE-LOG IU: NOT DETECTED LOG IU/ML
HCV RNA SERPL QL NAA+PROBE: NOT DETECTED

## 2022-09-26 NOTE — OR NURSING
During telephone preadmission pt states that he has already spoke with [facility] and he plans to do the procedure tomorrow 9/27/22 without sedation but that if it is too painful he will have a ride home arranged.

## 2022-09-27 ENCOUNTER — APPOINTMENT (OUTPATIENT)
Dept: CARDIOLOGY | Facility: MEDICAL CENTER | Age: 52
End: 2022-09-27
Attending: NURSE PRACTITIONER
Payer: COMMERCIAL

## 2022-09-27 ENCOUNTER — HOSPITAL ENCOUNTER (OUTPATIENT)
Facility: MEDICAL CENTER | Age: 52
End: 2022-09-27
Attending: INTERNAL MEDICINE | Admitting: INTERNAL MEDICINE
Payer: COMMERCIAL

## 2022-09-27 VITALS
HEART RATE: 68 BPM | SYSTOLIC BLOOD PRESSURE: 133 MMHG | TEMPERATURE: 97 F | HEIGHT: 65 IN | WEIGHT: 165.34 LBS | RESPIRATION RATE: 18 BRPM | OXYGEN SATURATION: 95 % | BODY MASS INDEX: 27.55 KG/M2 | DIASTOLIC BLOOD PRESSURE: 76 MMHG

## 2022-09-27 DIAGNOSIS — C83.31 DIFFUSE LARGE B-CELL LYMPHOMA OF LYMPH NODES OF NECK (HCC): ICD-10-CM

## 2022-09-27 LAB
B2 MICROGLOB SERPL-MCNC: 1.6 MG/L (ref 0.8–2.4)
BASOPHILS # BLD AUTO: 0.8 % (ref 0–1.8)
BASOPHILS # BLD: 0.06 K/UL (ref 0–0.12)
EOSINOPHIL # BLD AUTO: 0.42 K/UL (ref 0–0.51)
EOSINOPHIL NFR BLD: 5.3 % (ref 0–6.9)
ERYTHROCYTE [DISTWIDTH] IN BLOOD BY AUTOMATED COUNT: 39.5 FL (ref 35.9–50)
HCT VFR BLD AUTO: 44.2 % (ref 42–52)
HGB BLD-MCNC: 15.2 G/DL (ref 14–18)
HGB RETIC QN AUTO: 36.4 PG/CELL (ref 29–35)
IMM GRANULOCYTES # BLD AUTO: 0.02 K/UL (ref 0–0.11)
IMM GRANULOCYTES NFR BLD AUTO: 0.3 % (ref 0–0.9)
IMM RETICS NFR: 10.3 % (ref 9.3–17.4)
LYMPHOCYTES # BLD AUTO: 2.69 K/UL (ref 1–4.8)
LYMPHOCYTES NFR BLD: 34.1 % (ref 22–41)
MCH RBC QN AUTO: 32.5 PG (ref 27–33)
MCHC RBC AUTO-ENTMCNC: 34.4 G/DL (ref 33.7–35.3)
MCV RBC AUTO: 94.6 FL (ref 81.4–97.8)
MONOCYTES # BLD AUTO: 0.75 K/UL (ref 0–0.85)
MONOCYTES NFR BLD AUTO: 9.5 % (ref 0–13.4)
NEUTROPHILS # BLD AUTO: 3.94 K/UL (ref 1.82–7.42)
NEUTROPHILS NFR BLD: 50 % (ref 44–72)
NRBC # BLD AUTO: 0 K/UL
NRBC BLD-RTO: 0 /100 WBC
PLATELET # BLD AUTO: 270 K/UL (ref 164–446)
PMV BLD AUTO: 8.6 FL (ref 9–12.9)
RBC # BLD AUTO: 4.67 M/UL (ref 4.7–6.1)
RETICS # AUTO: 0.06 M/UL (ref 0.04–0.06)
RETICS/RBC NFR: 1.4 % (ref 0.8–2.1)
WBC # BLD AUTO: 7.9 K/UL (ref 4.8–10.8)

## 2022-09-27 PROCEDURE — 38222 DX BONE MARROW BX & ASPIR: CPT | Performed by: HOSPITALIST

## 2022-09-27 PROCEDURE — 160025 RECOVERY II MINUTES (STATS): Performed by: HOSPITALIST

## 2022-09-27 PROCEDURE — 88368 INSITU HYBRIDIZATION MANUAL: CPT

## 2022-09-27 PROCEDURE — 85046 RETICYTE/HGB CONCENTRATE: CPT

## 2022-09-27 PROCEDURE — 88342 IMHCHEM/IMCYTCHM 1ST ANTB: CPT

## 2022-09-27 PROCEDURE — 88360 TUMOR IMMUNOHISTOCHEM/MANUAL: CPT

## 2022-09-27 PROCEDURE — 160048 HCHG OR STATISTICAL LEVEL 1-5: Performed by: HOSPITALIST

## 2022-09-27 PROCEDURE — 160027 HCHG SURGERY MINUTES - 1ST 30 MINS LEVEL 2: Performed by: HOSPITALIST

## 2022-09-27 PROCEDURE — 88305 TISSUE EXAM BY PATHOLOGIST: CPT

## 2022-09-27 PROCEDURE — 88184 FLOWCYTOMETRY/ TC 1 MARKER: CPT

## 2022-09-27 PROCEDURE — 88237 TISSUE CULTURE BONE MARROW: CPT

## 2022-09-27 PROCEDURE — 88369 M/PHMTRC ALYSISHQUANT/SEMIQ: CPT

## 2022-09-27 PROCEDURE — 88264 CHROMOSOME ANALYSIS 20-25: CPT

## 2022-09-27 PROCEDURE — 88185 FLOWCYTOMETRY/TC ADD-ON: CPT | Mod: 91

## 2022-09-27 PROCEDURE — 88311 DECALCIFY TISSUE: CPT

## 2022-09-27 PROCEDURE — 88313 SPECIAL STAINS GROUP 2: CPT | Mod: 91

## 2022-09-27 PROCEDURE — 85025 COMPLETE CBC W/AUTO DIFF WBC: CPT

## 2022-09-27 PROCEDURE — 88341 IMHCHEM/IMCYTCHM EA ADD ANTB: CPT

## 2022-09-27 PROCEDURE — 160046 HCHG PACU - 1ST 60 MINS PHASE II: Performed by: HOSPITALIST

## 2022-09-27 RX ORDER — SODIUM CHLORIDE, SODIUM LACTATE, POTASSIUM CHLORIDE, CALCIUM CHLORIDE 600; 310; 30; 20 MG/100ML; MG/100ML; MG/100ML; MG/100ML
INJECTION, SOLUTION INTRAVENOUS CONTINUOUS
Status: DISCONTINUED | OUTPATIENT
Start: 2022-09-27 | End: 2022-09-27 | Stop reason: HOSPADM

## 2022-09-27 ASSESSMENT — FIBROSIS 4 INDEX: FIB4 SCORE: 1.09

## 2022-09-27 ASSESSMENT — PAIN DESCRIPTION - PAIN TYPE: TYPE: SURGICAL PAIN

## 2022-09-27 NOTE — OR NURSING
1135 - Pt to [facility]. Report from ary RN. On RA. Respirations even and unlabored. VSS. Left hip site CDI soft with bandaid.    1147 - Discharge instructions discussed with patient as he only received local. All questions answered. Verbalized understanding.     1150 - Pt escorted out of department in by ambulation with all belongings. Discharged home to self. PIV removed with tip intact.

## 2022-09-27 NOTE — PROCEDURES
Bone Marrow Biopsy/Aspiration    Date/Time: 9/27/2022 10:54 AM  Performed by: Subhash Mathews M.D.  Authorized by: Subhash Mathews M.D.     Consent:     Consent obtained:  Verbal    Consent given by:  Patient    Risks discussed:  Bleeding, infection, pain and repeat procedure    Alternatives discussed:  No treatment, delayed treatment and alternative treatment  Universal protocol:     Procedure explained and questions answered to patient or proxy's satisfaction: yes      Relevant documents present and verified: yes      Test results available and properly labeled: yes      Required blood products, implants, devices, and special equipment available: yes      Immediately prior to procedure a time out was called: yes      Site/side marked: yes      Patient identity confirmed:  Verbally with patient, arm band and provided demographic data  Pre-procedure details:     Procedure type:  Aspiration and biopsy    Requesting physician:  Peggy Kinsey    Indications:  Lymphoma    Position:  Prone    Buttock laterality:  Left    Local anesthetic:  1% Lidocaine    Subcutaneous volume:  1 mL    Periosteum anesthetic volume:  4 mL    Preparation: Patient was prepped and draped in usual sterile fashion    Sedation:     Patient Sedated: No    Procedure details:     Aspirate obtained:  5 mL followed by 5 mL    Biopsy performed:  1 core    Number of attempts:  1    Estimated blood loss (mL):  2  Post-procedure:     Puncture site:  Adhesive bandage applied    Patient tolerance of procedure:  Tolerated well, no immediate complications  Comments:      Procedure performed with local anesthetic

## 2022-09-30 ENCOUNTER — HOSPITAL ENCOUNTER (OUTPATIENT)
Dept: CARDIOLOGY | Facility: MEDICAL CENTER | Age: 52
End: 2022-09-30
Attending: NURSE PRACTITIONER
Payer: COMMERCIAL

## 2022-09-30 DIAGNOSIS — C83.31 DIFFUSE LARGE B-CELL LYMPHOMA OF LYMPH NODES OF NECK (HCC): ICD-10-CM

## 2022-09-30 LAB
LV EJECT FRACT  99904: 55
LV EJECT FRACT MOD 2C 99903: 52.75
LV EJECT FRACT MOD 4C 99902: 56.06
LV EJECT FRACT MOD BP 99901: 54.55

## 2022-09-30 PROCEDURE — 93306 TTE W/DOPPLER COMPLETE: CPT | Mod: 26 | Performed by: INTERNAL MEDICINE

## 2022-09-30 PROCEDURE — 93306 TTE W/DOPPLER COMPLETE: CPT

## 2022-10-03 ENCOUNTER — HOSPITAL ENCOUNTER (OUTPATIENT)
Dept: RADIOLOGY | Facility: MEDICAL CENTER | Age: 52
End: 2022-10-03
Attending: NURSE PRACTITIONER
Payer: COMMERCIAL

## 2022-10-03 DIAGNOSIS — R59.0 SUBMANDIBULAR LYMPHADENOPATHY: ICD-10-CM

## 2022-10-03 PROCEDURE — A9552 F18 FDG: HCPCS

## 2022-10-04 ENCOUNTER — HOSPITAL ENCOUNTER (OUTPATIENT)
Dept: HEMATOLOGY ONCOLOGY | Facility: MEDICAL CENTER | Age: 52
End: 2022-10-04
Attending: INTERNAL MEDICINE
Payer: COMMERCIAL

## 2022-10-04 VITALS
TEMPERATURE: 98.1 F | HEIGHT: 65 IN | SYSTOLIC BLOOD PRESSURE: 118 MMHG | OXYGEN SATURATION: 95 % | BODY MASS INDEX: 27.42 KG/M2 | WEIGHT: 164.57 LBS | RESPIRATION RATE: 16 BRPM | HEART RATE: 88 BPM | DIASTOLIC BLOOD PRESSURE: 76 MMHG

## 2022-10-04 DIAGNOSIS — C83.31 DIFFUSE LARGE B-CELL LYMPHOMA OF LYMPH NODES OF NECK (HCC): ICD-10-CM

## 2022-10-04 PROCEDURE — 99212 OFFICE O/P EST SF 10 MIN: CPT | Performed by: STUDENT IN AN ORGANIZED HEALTH CARE EDUCATION/TRAINING PROGRAM

## 2022-10-04 PROCEDURE — 99215 OFFICE O/P EST HI 40 MIN: CPT | Performed by: STUDENT IN AN ORGANIZED HEALTH CARE EDUCATION/TRAINING PROGRAM

## 2022-10-04 RX ORDER — DIPHENHYDRAMINE HYDROCHLORIDE 50 MG/ML
25 INJECTION INTRAMUSCULAR; INTRAVENOUS PRN
Status: CANCELLED | OUTPATIENT
Start: 2022-10-27 | End: 2022-10-12

## 2022-10-04 RX ORDER — ACETAMINOPHEN 325 MG/1
650 TABLET ORAL ONCE
Status: CANCELLED | OUTPATIENT
Start: 2022-10-27 | End: 2022-10-10

## 2022-10-04 RX ORDER — 0.9 % SODIUM CHLORIDE 0.9 %
10 VIAL (ML) INJECTION PRN
Status: CANCELLED | OUTPATIENT
Start: 2022-10-27

## 2022-10-04 RX ORDER — SODIUM CHLORIDE 9 MG/ML
500 INJECTION, SOLUTION INTRAVENOUS ONCE
Status: CANCELLED | OUTPATIENT
Start: 2022-10-27 | End: 2022-10-10

## 2022-10-04 RX ORDER — 0.9 % SODIUM CHLORIDE 0.9 %
10 VIAL (ML) INJECTION PRN
Status: CANCELLED | OUTPATIENT
Start: 2022-10-26

## 2022-10-04 RX ORDER — 0.9 % SODIUM CHLORIDE 0.9 %
VIAL (ML) INJECTION PRN
Status: CANCELLED | OUTPATIENT
Start: 2022-10-26

## 2022-10-04 RX ORDER — MEPERIDINE HYDROCHLORIDE 25 MG/ML
25 INJECTION INTRAMUSCULAR; INTRAVENOUS; SUBCUTANEOUS PRN
Status: CANCELLED | OUTPATIENT
Start: 2022-10-27 | End: 2022-10-12

## 2022-10-04 RX ORDER — HEPARIN SODIUM (PORCINE) LOCK FLUSH IV SOLN 100 UNIT/ML 100 UNIT/ML
500 SOLUTION INTRAVENOUS PRN
Status: CANCELLED | OUTPATIENT
Start: 2022-10-26

## 2022-10-04 RX ORDER — SODIUM CHLORIDE 9 MG/ML
500 INJECTION, SOLUTION INTRAVENOUS ONCE
Status: CANCELLED | OUTPATIENT
Start: 2022-10-27

## 2022-10-04 RX ORDER — PREDNISONE 50 MG/1
100 TABLET ORAL ONCE
Status: CANCELLED | OUTPATIENT
Start: 2022-10-27 | End: 2022-10-10

## 2022-10-04 RX ORDER — 0.9 % SODIUM CHLORIDE 0.9 %
VIAL (ML) INJECTION PRN
Status: CANCELLED | OUTPATIENT
Start: 2022-10-27

## 2022-10-04 RX ORDER — 0.9 % SODIUM CHLORIDE 0.9 %
3 VIAL (ML) INJECTION PRN
Status: CANCELLED | OUTPATIENT
Start: 2022-10-27

## 2022-10-04 RX ORDER — 0.9 % SODIUM CHLORIDE 0.9 %
3 VIAL (ML) INJECTION PRN
Status: CANCELLED | OUTPATIENT
Start: 2022-10-26

## 2022-10-04 RX ORDER — EPINEPHRINE 1 MG/ML(1)
0.5 AMPUL (ML) INJECTION PRN
Status: CANCELLED | OUTPATIENT
Start: 2022-10-27

## 2022-10-04 RX ORDER — ACETAMINOPHEN 325 MG/1
650 TABLET ORAL PRN
Status: CANCELLED | OUTPATIENT
Start: 2022-10-27 | End: 2022-10-12

## 2022-10-04 RX ORDER — ONDANSETRON 2 MG/ML
4 INJECTION INTRAMUSCULAR; INTRAVENOUS PRN
Status: CANCELLED | OUTPATIENT
Start: 2022-10-27

## 2022-10-04 RX ORDER — DIPHENHYDRAMINE HYDROCHLORIDE 50 MG/ML
50 INJECTION INTRAMUSCULAR; INTRAVENOUS PRN
Status: CANCELLED | OUTPATIENT
Start: 2022-10-27

## 2022-10-04 RX ORDER — HEPARIN SODIUM (PORCINE) LOCK FLUSH IV SOLN 100 UNIT/ML 100 UNIT/ML
500 SOLUTION INTRAVENOUS PRN
Status: CANCELLED | OUTPATIENT
Start: 2022-10-27

## 2022-10-04 RX ORDER — METHYLPREDNISOLONE SODIUM SUCCINATE 125 MG/2ML
125 INJECTION, POWDER, LYOPHILIZED, FOR SOLUTION INTRAMUSCULAR; INTRAVENOUS PRN
Status: CANCELLED | OUTPATIENT
Start: 2022-10-27

## 2022-10-04 RX ORDER — PROCHLORPERAZINE MALEATE 10 MG
10 TABLET ORAL EVERY 6 HOURS PRN
Status: CANCELLED | OUTPATIENT
Start: 2022-10-27

## 2022-10-04 RX ORDER — SODIUM CHLORIDE 9 MG/ML
INJECTION, SOLUTION INTRAVENOUS CONTINUOUS
Status: CANCELLED | OUTPATIENT
Start: 2022-10-27

## 2022-10-04 RX ORDER — ONDANSETRON 8 MG/1
8 TABLET, ORALLY DISINTEGRATING ORAL PRN
Status: CANCELLED | OUTPATIENT
Start: 2022-10-27

## 2022-10-04 ASSESSMENT — ENCOUNTER SYMPTOMS
TREMORS: 0
DIZZINESS: 0
COUGH: 0
BRUISES/BLEEDS EASILY: 0
CHILLS: 0
ORTHOPNEA: 0
DEPRESSION: 0
VOMITING: 0
PALPITATIONS: 0
TINGLING: 0
ABDOMINAL PAIN: 0
MEMORY LOSS: 0
SHORTNESS OF BREATH: 0
WHEEZING: 0
FEVER: 0
NAUSEA: 0
SORE THROAT: 0
HEADACHES: 0
BLURRED VISION: 0
FOCAL WEAKNESS: 0
WEIGHT LOSS: 0
NECK PAIN: 0
SENSORY CHANGE: 0
HEARTBURN: 0
SPUTUM PRODUCTION: 0

## 2022-10-04 ASSESSMENT — FIBROSIS 4 INDEX: FIB4 SCORE: 1.15

## 2022-10-04 ASSESSMENT — PAIN SCALES - GENERAL: PAINLEVEL: NO PAIN

## 2022-10-04 NOTE — ADDENDUM NOTE
Encounter addended by: Jorge Hall on: 10/4/2022 1:51 PM   Actions taken: Charge Capture section accepted

## 2022-10-04 NOTE — PROGRESS NOTES
Consult Note: Hematology/Oncology     Referring Provider: Peggy Kinsey  Primary Care:  Carlos Mcnamara M.D.    Chief Complaint   Patient presents with    New Patient     Diffuse large B-cell lymphoma        Current Treatment: None    Prior Treatment: None    Subjective:   History of Presenting Illness:  Azeem Mejia is a 52 y.o. male who presents today with a new diagnosis of diffuse large B-cell lymphoma.    Patient was originally seen by his PCP on August 17, 2022 with a submandibular lymphadenopathy.  He reported a 2-week history of lymphadenopathy.  Blood work at that time showed no abnormalities.  Patient underwent an ultrasound the same day which showed a 3.1 x 1.7 x 2.1 cm lymph node on the left submandibular region.  He was referred to Peggy on August 30, 2022 for submandibular lymphadenopathy work-up.    Patient underwent a CT with contrast which showed a 2.2 x 2.5 x 3.4 cm mass.  And he underwent a biopsy on September 7 with initial pathology showing a B-cell lymphoproliferative disorder with dim CD10 expression.  FISH analysis was negative for CCN D1 IGH, Bcl-2, BCL6, MYC.  Final path showed diffuse large B-cell lymphoma with germinal center B type.  C-Myc was negative.  Patient is not a double hit expresser.    Today patient presents for his initial visit.  He denies any fevers night sweats chills or unexplained weight loss.    His only complaint is sinus infections.    He works in [employer].  He was a major in literature. 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     Past Medical History:   Diagnosis Date    Asthma     Diffuse large B-cell lymphoma of lymph nodes of neck (HCC)     Hyperlipidemia         Past Surgical History:   Procedure Laterality Date    MT DX BONE MARROW ASPIRATIONS Left 9/27/2022    Procedure: BONE MARROW CORE AND ASPIRATION;  Surgeon: Subhash Mathews  M.D.;  Location: SURGERY SAME DAY [facility];  Service: Orthopedics    VT DX BONE MARROW BIOPSIES Left 9/27/2022    Procedure: BIOPSY, BONE MARROW, USING NEEDLE OR TROCAR - FLEMING;  Surgeon: Subhash Mathews M.D.;  Location: SURGERY SAME DAY [facility];  Service: Orthopedics    NASAL POLYPECTOMY Right        Social History     Tobacco Use    Smoking status: Never    Smokeless tobacco: Never   Vaping Use    Vaping Use: Never used   Substance Use Topics    Alcohol use: Yes     Comment: very rare    Drug use: Not Currently        Family History   Problem Relation Age of Onset    Diabetes Father     Asthma Sister     Hyperlipidemia Neg Hx     Hypertension Neg Hx     Cancer Neg Hx        Allergies   Allergen Reactions    Seasonal        Current Outpatient Medications   Medication Sig Dispense Refill    Pseudoephedrine HCl (SUDAFED 12 HOUR PO) Take  by mouth as needed.      aspirin EC (ECOTRIN) 81 MG Tablet Delayed Response Take 81 mg by mouth as needed.      sildenafil citrate (VIAGRA) 100 MG tablet Take 1 Tablet by mouth 1 time a day as needed for Erectile Dysfunction. 10 Tablet 3    albuterol 108 (90 Base) MCG/ACT Aero Soln inhalation aerosol Inhale 2 Puffs every 6 hours as needed for Shortness of Breath. 8.5 g 1     No current facility-administered medications for this encounter.       Review of Systems   Constitutional:  Negative for chills, fever, malaise/fatigue and weight loss.   HENT:  Negative for congestion, ear pain, nosebleeds and sore throat.    Eyes:  Negative for blurred vision.   Respiratory:  Negative for cough, sputum production, shortness of breath and wheezing.    Cardiovascular:  Negative for chest pain, palpitations, orthopnea and leg swelling.   Gastrointestinal:  Negative for abdominal pain, heartburn, nausea and vomiting.   Genitourinary:  Negative for dysuria, frequency and urgency.   Musculoskeletal:  Negative for neck pain.   Neurological:  Negative for dizziness, tingling, tremors, sensory change,  "focal weakness and headaches.   Endo/Heme/Allergies:  Does not bruise/bleed easily.   Psychiatric/Behavioral:  Negative for depression, memory loss and suicidal ideas.    All other systems reviewed and are negative.    Problem list, medications, and allergies reviewed by myself today in Epic.     Objective:     Vitals:    10/04/22 1305   BP: 118/76   BP Location: Left arm   Patient Position: Sitting   BP Cuff Size: Adult   Pulse: 88   Resp: 16   Temp: 36.7 °C (98.1 °F)   TempSrc: Temporal   SpO2: 95%   Weight: 74.6 kg (164 lb 9.2 oz)   Height: 1.651 m (5' 5\")       DESC; KARNOFSKY SCALE WITH ECOG EQUIVALENT: 90, Able to carry on normal activity; minor signs or symptoms of disease (ECOG equivalent 0)    DISTRESS LEVEL: no apparent distress    Physical Exam  Constitutional:       General: He is not in acute distress.     Appearance: Normal appearance.   HENT:      Head: Normocephalic and atraumatic.      Nose: Nose normal. No congestion.      Mouth/Throat:      Mouth: Mucous membranes are moist.      Pharynx: Oropharynx is clear.   Eyes:      General: No scleral icterus.     Conjunctiva/sclera: Conjunctivae normal.      Pupils: Pupils are equal, round, and reactive to light.   Cardiovascular:      Rate and Rhythm: Normal rate and regular rhythm.      Pulses: Normal pulses.      Heart sounds: No murmur heard.    No friction rub.   Pulmonary:      Effort: Pulmonary effort is normal. No respiratory distress.      Breath sounds: Normal breath sounds. No stridor. No wheezing or rales.   Chest:      Chest wall: No tenderness.   Abdominal:      General: Abdomen is flat. Bowel sounds are normal. There is no distension.      Palpations: Abdomen is soft. There is no mass.      Tenderness: There is no abdominal tenderness. There is no guarding or rebound.   Musculoskeletal:         General: No swelling, tenderness or deformity. Normal range of motion.      Cervical back: Normal range of motion and neck supple. No rigidity or " tenderness.      Right lower leg: No edema.      Left lower leg: No edema.   Skin:     General: Skin is warm.      Coloration: Skin is not jaundiced or pale.      Findings: No bruising or rash.   Neurological:      General: No focal deficit present.      Mental Status: He is alert and oriented to person, place, and time. Mental status is at baseline.      Motor: No weakness.   Psychiatric:         Mood and Affect: Mood normal.         Behavior: Behavior normal.         Thought Content: Thought content normal.         Judgment: Judgment normal.       Labs:   Most recent labs reviewed.  CBC and CMP  grossly normal.  HIV negative hepatitis B negative LDH normal uric acid normal     Imaging:   Most recent images below have been independently reviewed by me.      ECHO  Normal left ventricular size, thickness, and systolic function.  The left ventricular ejection fraction is visually estimated to be 55%.  Mild hypokinesis basal posterior wall otherwise normal regional wall   motion.  Normal right ventricular size and systolic function.  No prior study is available for comparison    PET SCAN    1. Interval decrease in size of the left submandibular lymph node mass, measuring 1.3 x 1.9 cm, previously 2.5 x 3.5 cm. The mass demonstrates mild increased uptake (SUV max 5.6 )     2. Additional uptake in the nonenlarged left level 2/3 lymph nodes (SUV max 6.7) and right level 2 lymph nodes (SUV max 5.3) could relate to lymphoma as well  Pathology:  FINAL DIAGNOSIS:     A. Left submandibular node core biopsy:          Diffuse large B cell lymphoma, GCB type.          C-MYC IHC is negative which shows this is not a double           expressor.     Synoptic Summary for Non-Hodgkin Lymphoma/Lymphoid Neoplasms:          -Specimen: Lymph node        -Procedure: Core biopsy        -Tumor site: Left submandibular        -Histologic type: Diffuse large B cell lymphoma, GCB        -Immunophenotyping:            Flow cytometry: Performed;  please refer to microscopic             description for details.            Immunohistochemistry: Performed; please refer to microscopic             description for details.        -Cytogenetic Studies: Not performed.        -Molecular Genetic Studies: FISH is negative for Bcl-2, Bcl-6,         C-MYC and t(11;14)- negative for double/triple hit.            Comment: One area of the core shows lower grade cells that have           a lower Ki-67 of   10% and may either represent either only a           partially involved node or an area of lower grade lymphoma such           as follicular lymphoma.  If this distinction is clinically           important than an excision of the whole node is necessary for           evaluation.  This case has been reviewed by a second           pathologist, Dr. Villauneva, who concurs with the diagnosis.       Bone Marrow Biopsy  FINAL DIAGNOSIS:     Peripheral blood smear and CBC:          Normal white blood cell count demonstrating a mild relative           monocytosis.   Bone marrow aspirate, clot, and core biopsy:          -Normal cellular bone marrow demonstrating:          -There is no morphologic, immunohistochemical or flow cytometric           evidence for bone marrow involvement by lymphoma.          -Trilineage hematopoiesis with maturation.          -No morphologic increase in blast cells.          -Focal dysmegakaryocytopoiesis (<10%).          -Mild increased polyclonal plasma cells.          -Adequate stainable bone marrow iron stores.          See comment.         Assessment/Plan:     Cancer Staging  Diffuse large B-cell lymphoma of lymph nodes of neck (HCC)  Staging form: Hodgkin and Non-Hodgkin Lymphoma, AJCC 8th Edition  - Clinical stage from 10/4/2022: Stage IIE (Diffuse large B-cell lymphoma) - Signed by Henna Kwan M.D. on 10/4/2022       Mr. Mejia is a 51 yo M who presents today for a new diagnosis of DLBCL.     We had a long discussion today about his new  diagnosis.  We calculated his IPI score is LOW.  We discussed that he has Stage II disease (with Two or more elisa groups on the same side of the Diaphragm).  We will proceed with RCHOP x3 cycles with Interim restaging with PET/CT after 3 cycles.    Regimen  21-day cycles for 3 cycles followed by PET scan  Cyclophosphamide 750 mg per metered squared IV 30 minutes on day 1 (patient should receive a combination of oral and IV hydration roughly 3 L on day of chemo)  Doxorubicin 50 mg per metered squared IV push on day 1  Vincristine 1.4 mg/m² with a maximum of 2 mg IV over 10 minutes on day 1  Prednisone 100 mg p.o. daily's on days 1 through 5  Rituximab 375 mg/m² on day 1    Reference  1.  NCCN guidelines for B-cell lymphoma V.4.2022  2. Coiffier B et al. Blood 2010:116(12):2040-5  3. Lashon MARCOS et al. J Clin Onc 2020:38(26):9714-9061      Any questions and concerns raised by the patient were addressed and answered. Patient denies any further questions.  Patient encouraged to call the office with any concerns or issues.     Henna Kwan M.D.  Hematology/Oncology    40 minutes was spent on this case

## 2022-10-07 ENCOUNTER — HOSPITAL ENCOUNTER (OUTPATIENT)
Dept: HEMATOLOGY ONCOLOGY | Facility: MEDICAL CENTER | Age: 52
End: 2022-10-07
Attending: STUDENT IN AN ORGANIZED HEALTH CARE EDUCATION/TRAINING PROGRAM
Payer: COMMERCIAL

## 2022-10-07 DIAGNOSIS — T45.1X5A CHEMOTHERAPY INDUCED NAUSEA AND VOMITING: ICD-10-CM

## 2022-10-07 DIAGNOSIS — C83.31 DIFFUSE LARGE B-CELL LYMPHOMA OF LYMPH NODES OF NECK (HCC): Primary | ICD-10-CM

## 2022-10-07 DIAGNOSIS — R11.2 CHEMOTHERAPY INDUCED NAUSEA AND VOMITING: ICD-10-CM

## 2022-10-07 DIAGNOSIS — Z91.89 AT HIGH RISK OF TUMOR LYSIS SYNDROME: ICD-10-CM

## 2022-10-07 RX ORDER — PREDNISONE 50 MG/1
100 TABLET ORAL DAILY
Qty: 10 TABLET | Refills: 5 | Status: SHIPPED | OUTPATIENT
Start: 2022-10-07 | End: 2022-10-12

## 2022-10-07 RX ORDER — ALLOPURINOL 300 MG/1
300 TABLET ORAL DAILY
Qty: 30 TABLET | Refills: 5 | Status: SHIPPED | OUTPATIENT
Start: 2022-10-07 | End: 2022-11-30 | Stop reason: SDUPTHER

## 2022-10-07 RX ORDER — ONDANSETRON 4 MG/1
4 TABLET, FILM COATED ORAL EVERY 4 HOURS PRN
Qty: 30 TABLET | Refills: 6 | Status: SHIPPED
Start: 2022-10-07 | End: 2022-10-07

## 2022-10-07 RX ORDER — PREDNISONE 50 MG/1
100 TABLET ORAL DAILY
Qty: 10 TABLET | Refills: 5 | Status: SHIPPED
Start: 2022-10-07 | End: 2022-10-07

## 2022-10-07 RX ORDER — ALLOPURINOL 300 MG/1
300 TABLET ORAL DAILY
Qty: 30 TABLET | Refills: 5 | Status: SHIPPED
Start: 2022-10-07 | End: 2022-10-07

## 2022-10-07 RX ORDER — PROCHLORPERAZINE MALEATE 10 MG
10 TABLET ORAL EVERY 6 HOURS PRN
Qty: 30 TABLET | Refills: 6 | Status: SHIPPED
Start: 2022-10-07 | End: 2023-03-20

## 2022-10-07 RX ORDER — ONDANSETRON 4 MG/1
4 TABLET, FILM COATED ORAL EVERY 4 HOURS PRN
Qty: 30 TABLET | Refills: 6 | Status: SHIPPED
Start: 2022-10-07 | End: 2023-03-20

## 2022-10-07 RX ORDER — PROCHLORPERAZINE MALEATE 10 MG
10 TABLET ORAL EVERY 6 HOURS PRN
Qty: 30 TABLET | Refills: 6 | Status: SHIPPED
Start: 2022-10-07 | End: 2022-10-07

## 2022-10-07 NOTE — NON-PROVIDER
The following appointments, labs, and medications have been provided to the patient:  Line: none  ECHO: last completed 9/30/22  On Pro/Neulasta: none ordered  Labs: last completed 9/22 & 9/27  Lab Appointments: none scheduled  Follow up appts: to be determined, waiting on auth  Chemo/immunotherapy class: completed 10/7/22  Nausea medication: zofran/compazine prescribed (e-scripts sent)  Other treatment specific meds: allopurinol, prednisone  Oral chemo follow up: n/a  Pain medication: per provider's discretion  Nurse bishop: Referral placed 10/7/22  Dietician:  Referred per policy  SW: n/a at this time      Additional info/teaching for chemotherapy patients:  1.  Neutropenia reminder card provided to patient      Patient was provided with drug specific handouts and [facility] side effects sheet. Patient verbalized that questions and concerns regarding treatment have been addressed. Consent to proceed with treatment was reviewed and signed during this visit.    Spent 60 minutes of continuous, non-interrupted, face-to-face patient contact in which greater than 50% of the visit was spent counseling and coordinating of care.

## 2022-10-17 DIAGNOSIS — C83.31 DIFFUSE LARGE B-CELL LYMPHOMA OF LYMPH NODES OF NECK (HCC): Primary | ICD-10-CM

## 2022-10-21 ENCOUNTER — PRE-ADMISSION TESTING (OUTPATIENT)
Dept: ADMISSIONS | Facility: MEDICAL CENTER | Age: 52
End: 2022-10-21
Attending: STUDENT IN AN ORGANIZED HEALTH CARE EDUCATION/TRAINING PROGRAM
Payer: COMMERCIAL

## 2022-10-24 ENCOUNTER — APPOINTMENT (OUTPATIENT)
Dept: RADIOLOGY | Facility: MEDICAL CENTER | Age: 52
End: 2022-10-24
Attending: STUDENT IN AN ORGANIZED HEALTH CARE EDUCATION/TRAINING PROGRAM
Payer: COMMERCIAL

## 2022-10-24 ENCOUNTER — HOSPITAL ENCOUNTER (OUTPATIENT)
Facility: MEDICAL CENTER | Age: 52
End: 2022-10-24
Attending: STUDENT IN AN ORGANIZED HEALTH CARE EDUCATION/TRAINING PROGRAM | Admitting: STUDENT IN AN ORGANIZED HEALTH CARE EDUCATION/TRAINING PROGRAM
Payer: COMMERCIAL

## 2022-10-24 VITALS
TEMPERATURE: 97.4 F | SYSTOLIC BLOOD PRESSURE: 106 MMHG | HEART RATE: 68 BPM | RESPIRATION RATE: 19 BRPM | DIASTOLIC BLOOD PRESSURE: 71 MMHG | WEIGHT: 168.65 LBS | HEIGHT: 65 IN | OXYGEN SATURATION: 95 % | BODY MASS INDEX: 28.1 KG/M2

## 2022-10-24 DIAGNOSIS — C83.31 DIFFUSE LARGE B-CELL LYMPHOMA OF LYMPH NODES OF NECK (HCC): ICD-10-CM

## 2022-10-24 PROCEDURE — 160002 HCHG RECOVERY MINUTES (STAT)

## 2022-10-24 PROCEDURE — 99152 MOD SED SAME PHYS/QHP 5/>YRS: CPT

## 2022-10-24 PROCEDURE — 700111 HCHG RX REV CODE 636 W/ 250 OVERRIDE (IP)

## 2022-10-24 PROCEDURE — 160036 HCHG PACU - EA ADDL 30 MINS PHASE I

## 2022-10-24 PROCEDURE — 700105 HCHG RX REV CODE 258: Performed by: RADIOLOGY

## 2022-10-24 PROCEDURE — C1788 PORT, INDWELLING, IMP: HCPCS

## 2022-10-24 PROCEDURE — 700101 HCHG RX REV CODE 250

## 2022-10-24 PROCEDURE — 160035 HCHG PACU - 1ST 60 MINS PHASE I

## 2022-10-24 RX ORDER — ACETAMINOPHEN 500 MG
500 TABLET ORAL EVERY 6 HOURS PRN
COMMUNITY

## 2022-10-24 RX ORDER — SODIUM CHLORIDE 9 MG/ML
500 INJECTION, SOLUTION INTRAVENOUS
Status: ACTIVE | OUTPATIENT
Start: 2022-10-24 | End: 2022-10-24

## 2022-10-24 RX ORDER — MIDAZOLAM HYDROCHLORIDE 1 MG/ML
.5-2 INJECTION INTRAMUSCULAR; INTRAVENOUS PRN
Status: ACTIVE | OUTPATIENT
Start: 2022-10-24 | End: 2022-10-24

## 2022-10-24 RX ORDER — ONDANSETRON 2 MG/ML
4 INJECTION INTRAMUSCULAR; INTRAVENOUS PRN
Status: ACTIVE | OUTPATIENT
Start: 2022-10-24 | End: 2022-10-24

## 2022-10-24 RX ORDER — LIDOCAINE HYDROCHLORIDE AND EPINEPHRINE 10; 10 MG/ML; UG/ML
INJECTION, SOLUTION INFILTRATION; PERINEURAL
Status: COMPLETED
Start: 2022-10-24 | End: 2022-10-24

## 2022-10-24 RX ORDER — HEPARIN SODIUM (PORCINE) LOCK FLUSH IV SOLN 100 UNIT/ML 100 UNIT/ML
SOLUTION INTRAVENOUS
Status: COMPLETED
Start: 2022-10-24 | End: 2022-10-24

## 2022-10-24 RX ORDER — MIDAZOLAM HYDROCHLORIDE 1 MG/ML
INJECTION INTRAMUSCULAR; INTRAVENOUS
Status: COMPLETED
Start: 2022-10-24 | End: 2022-10-24

## 2022-10-24 RX ORDER — SODIUM CHLORIDE 9 MG/ML
1000 INJECTION, SOLUTION INTRAVENOUS CONTINUOUS
Status: DISCONTINUED | OUTPATIENT
Start: 2022-10-24 | End: 2022-10-24 | Stop reason: HOSPADM

## 2022-10-24 RX ADMIN — LIDOCAINE HYDROCHLORIDE,EPINEPHRINE BITARTRATE: 10; .01 INJECTION, SOLUTION INFILTRATION; PERINEURAL at 10:25

## 2022-10-24 RX ADMIN — FENTANYL CITRATE 50 MCG: 50 INJECTION, SOLUTION INTRAMUSCULAR; INTRAVENOUS at 10:23

## 2022-10-24 RX ADMIN — MIDAZOLAM HYDROCHLORIDE 1 MG: 1 INJECTION INTRAMUSCULAR; INTRAVENOUS at 10:23

## 2022-10-24 RX ADMIN — MIDAZOLAM 1 MG: 1 INJECTION, SOLUTION INTRAMUSCULAR; INTRAVENOUS at 10:23

## 2022-10-24 RX ADMIN — HEPARIN 4 ML: 100 SYRINGE at 10:33

## 2022-10-24 RX ADMIN — SODIUM CHLORIDE 1000 ML: 9 INJECTION, SOLUTION INTRAVENOUS at 09:42

## 2022-10-24 ASSESSMENT — PAIN DESCRIPTION - PAIN TYPE: TYPE: SURGICAL PAIN

## 2022-10-24 ASSESSMENT — FIBROSIS 4 INDEX: FIB4 SCORE: 1.15

## 2022-10-24 NOTE — PROGRESS NOTES
Pt given discharge instructions and verbalized understanding, all questions are answered, signed copy in chart. PIV removed and dressed. Pt ambulatory with steady gait with sister taking him home. Pt took all belongings from room.

## 2022-10-24 NOTE — PROGRESS NOTES
Med rec updated and complete, per pt  Allergies reviewed, per pt   Pt reports no prescription medications, pt has prescriptions but has not started taking them yet.  Pt reports no antibiotics in the last 30 days.  Pt reports that he did not take PREDINSONE

## 2022-10-24 NOTE — DISCHARGE INSTRUCTIONS
HOME CARE INSTRUCTIONS    ACTIVITY: Rest and take it easy for the first 24 hours.  A responsible adult is recommended to remain with you during that time.  It is normal to feel sleepy.  We encourage you to not do anything that requires balance, judgment or coordination.    FOR 24 HOURS DO NOT:  Drive, operate machinery or run household appliances.  Drink beer or alcoholic beverages.  Make important decisions or sign legal documents.    SPECIAL INSTRUCTIONS: Implanted Port Insertion, Care After  This sheet gives you information about how to care for yourself after your procedure. Your health care provider may also give you more specific instructions. If you have problems or questions, contact your health care provider.  What can I expect after the procedure?  After the procedure, it is common to have:  Discomfort at the port insertion site.  Bruising on the skin over the port. This should improve over 3-4 days.  Follow these instructions at home:  Port care  After your port is placed, you will get a 's information card. The card has information about your port. Keep this card with you at all times.  Take care of the port as told by your health care provider. Ask your health care provider if you or a family member can get training for taking care of the port at home. A home health care nurse may also take care of the port.  Make sure to remember what type of port you have.  Incision care       Follow instructions from your health care provider about how to take care of your port insertion site. Make sure you:  Wash your hands with soap and water before and after you change your bandage (dressing). If soap and water are not available, use hand .  Change your dressing as told by your health care provider.  Leave stitches (sutures), skin glue, or adhesive strips in place. These skin closures may need to stay in place for 2 weeks or longer. If adhesive strip edges start to loosen and curl up, you may  trim the loose edges. Do not remove adhesive strips completely unless your health care provider tells you to do that.  Check your port insertion site every day for signs of infection. Check for:  Redness, swelling, or pain.  Fluid or blood.  Warmth.  Pus or a bad smell.  Activity  Return to your normal activities as told by your health care provider. Ask your health care provider what activities are safe for you.  Do not lift anything that is heavier than 10 lb (4.5 kg), or the limit that you are told, until your health care provider says that it is safe.  General instructions  Take over-the-counter and prescription medicines only as told by your health care provider.  Do not take baths, swim, or use a hot tub until your health care provider approves. Ask your health care provider if you may take showers. You may only be allowed to take sponge baths.  Do not drive for 24 hours if you were given a sedative during your procedure.  Wear a medical alert bracelet in case of an emergency. This will tell any health care providers that you have a port.  Keep all follow-up visits as told by your health care provider. This is important.  Contact a health care provider if:  You cannot flush your port with saline as directed, or you cannot draw blood from the port.  You have a fever or chills.  You have redness, swelling, or pain around your port insertion site.  You have fluid or blood coming from your port insertion site.  Your port insertion site feels warm to the touch.  You have pus or a bad smell coming from the port insertion site.  Get help right away if:  You have chest pain or shortness of breath.  You have bleeding from your port that you cannot control.  Summary  Take care of the port as told by your health care provider. Keep the 's information card with you at all times.  Change your dressing as told by your health care provider.  Contact a health care provider if you have a fever or chills or if you  have redness, swelling, or pain around your port insertion site.  Keep all follow-up visits as told by your health care provider.  This information is not intended to replace advice given to you by your health care provider. Make sure you discuss any questions you have with your health care provider.  Document Released: 10/08/2014 Document Revised: 07/16/2019 Document Reviewed: 07/16/2019  [employer] Patient Education © 2020 [employer]      DIET: To avoid nausea, slowly advance diet as tolerated, avoiding spicy or greasy foods for the first day.  Add more substantial food to your diet according to your physician's instructions.  Babies can be fed formula or breast milk as soon as they are hungry.  INCREASE FLUIDS AND FIBER TO AVOID CONSTIPATION.    SURGICAL DRESSING/BATHING:     MEDICATIONS: Resume taking daily medication.  Take prescribed pain medication with food.  If no medication is prescribed, you may take non-aspirin pain medication if needed.  PAIN MEDICATION CAN BE VERY CONSTIPATING.  Take a stool softener or laxative such as senokot, pericolace, or milk of magnesia if needed.    A follow-up appointment should be arranged with your doctor; call to schedule.    You should CALL YOUR PHYSICIAN if you develop:  Fever greater than 101 degrees F.  Pain not relieved by medication, or persistent nausea or vomiting.  Excessive bleeding (blood soaking through dressing) or unexpected drainage from the wound.  Extreme redness or swelling around the incision site, drainage of pus or foul smelling drainage.  Inability to urinate or empty your bladder within 8 hours.  Problems with breathing or chest pain.    You should call 911 if you develop problems with breathing or chest pain.  If you are unable to contact your doctor or surgical center, you should go to the nearest emergency room or urgent care center.  Physician's telephone #: [phone]    MILD FLU-LIKE SYMPTOMS ARE NORMAL.  YOU MAY EXPERIENCE GENERALIZED MUSCLE  ACHES, THROAT IRRITATION, HEADACHE AND/OR SOME NAUSEA.    If any questions arise, call your doctor.  If your doctor is not available, please feel free to call the Surgical Center at [phone].  The Center is open Monday through Friday from 7AM to 7PM.      A registered nurse may call you a few days after your surgery to see how you are doing after your procedure.    You may also receive a survey in the mail within the next two weeks and we ask that you take a few moments to complete the survey and return it to us.  Our goal is to provide you with very good care and we value your comments.     Depression / Suicide Risk    As you are discharged from this [facility] facility, it is important to learn how to keep safe from harming yourself.    Recognize the warning signs:  Abrupt changes in personality, positive or negative- including increase in energy   Giving away possessions  Change in eating patterns- significant weight changes-  positive or negative  Change in sleeping patterns- unable to sleep or sleeping all the time   Unwillingness or inability to communicate  Depression  Unusual sadness, discouragement and loneliness  Talk of wanting to die  Neglect of personal appearance   Rebelliousness- reckless behavior  Withdrawal from people/activities they love  Confusion- inability to concentrate     If you or a loved one observes any of these behaviors or has concerns about self-harm, here's what you can do:  Talk about it- your feelings and reasons for harming yourself  Remove any means that you might use to hurt yourself (examples: pills, rope, extension cords, firearm)  Get professional help from the community (Mental Health, Substance Abuse, psychological counseling)  Do not be alone:Call your Safe Contact- someone whom you trust who will be there for you.  Call your local CRISIS HOTLINE 164-3144 or 993-816-8969  Call your local Children's Mobile Crisis Response Team [place] (389) 429-8912 or  www.[employer]  Call the toll free National Suicide Prevention Hotlines   National Suicide Prevention Lifeline 782-837-UOSV (3788)  National [place] Line Network 800-SUICIDE (554-5818)    I acknowledge receipt and understanding of these Home Care instructions.

## 2022-10-24 NOTE — OR NURSING
Pt is aaox4, resting comfortably in [facility] on room air. His respirations are equal and unlabored, he is in no acute distress. He does not complain of pain at this time. He takes sips of water without difficulty or complaints of n/v. Surgical site is clean, dry and intact. No hematoma formation noted while in pacu at this time. Will continue to monitor.

## 2022-10-24 NOTE — PROGRESS NOTES
IR Nursing Note    Tunneled Port Placement by MD Hughes assisted by RT Melton, right upper chest access site.  Procedure site was marked by MD and verified using imaging guidance. Patient was placed in a supine position. Vitals were taken every 5 minutes and remained stable during procedure (see doc flow sheet for results).  CO2 waveform capnography was monitored and remained 39-43 throughout procedure.  Site sutured, dermabond and steri strips and a tegaderm dressing was placed over surgical site. Port Hep. locked    Report given to DERRICK Chin.  Patient transported to [address] via IR RN monitored then transferred care to report RN.    Power Port, ClearVUE, BARD 8F, cut@ 26cm:  REF: [id number]  LOT: [id number]  EXP: 01/31/2024

## 2022-10-24 NOTE — OR SURGEON
Immediate Post- Operative Note        Findings: Port      Procedure(s): Port placement      Estimated Blood Loss: Less than 5 ml        Complications: None            10/24/2022     10:45 AM     Corey Hughes M.D.

## 2022-10-25 LAB — PATHOLOGY CONSULT NOTE: NORMAL

## 2022-10-25 NOTE — PROGRESS NOTES
"Pharmacy chemotherapy verification:    Patient Name: Azeem Mejia    Dx: DLBCL     Protocol: R-CHOP   *Dosing Reference*  Rituximab (Rituxan) 375 mg/m2 IV on Day 1   Cyclophophamide (Cytoxan) 750 mg/m2 IV on Day 1   Doxorubicin (Adriamycin) 50 mg/m2 iv on Day 1   Vincristine 1.4 mg/m2 ( max 2 mg ) iv on Day 1   Prednisone 100 mg po qday on days 1-5 - pt to take at home as ordered  Repeat every 21 days x 4 to 6 cycles   NCCN Guidelines for B Cell Lymphomas V.4.2022  Coianne B, et al, Blood. 2010 Sep 23;116(12):2040-5.  Lashon DO, et al -J Clin Oncol 2020 Sep 10;38(26):2009-4643.    Allergies:  Seasonal     BP (!) 128/93   Pulse 85   Temp 36.7 °C (98 °F) (Temporal)   Resp 18   Ht 1.65 m (5' 4.96\")   Wt 75.6 kg (166 lb 10.7 oz)   SpO2 98%   BMI 27.77 kg/m²  Body surface area is 1.86 meters squared.    Labs 10/27/22:  ANC~ 4160 Plt = 264k   Hgb = 15.2     SCr = 0.65 mg/dL CrCl > 125 mL/min (min Scr 0.7 used)  AST/ALT/AP = WNL TBili = 0.5        9/22/22 09:14   Hepatitis B Surface Antigen Non-Reactive   Hepatitis B Cors Ab,IgM Non-Reactive     9/30/22 ECHO LVEF ~ 55 %      Drug Order   (Drug name, dose, route, IV Fluid & volume, frequency, number of doses) Cycle: 1      Previous treatment: N/A      Medication = Rituximab (Rituxan)  Base Dose = 375 mg/m2  Calc Dose: Base Dose x 1.86 m2 = 697.5 mg  Final Dose = 700 mg  Route = IV  Fluid & Volume =  mL  Admin Duration = See rate sheet          Rounded to vial size (within 10%) per dose rounding protocol; ok to treat with final dose     Medication = Doxorubicin (Adriamycin)  Base Dose = 50 mg/m2  Calc Dose: Base Dose x 1.86 m2 = 93 mg  Final Dose = 93 mg  Route = IV  Fluid & Volume = 46.5 mL ( 2mg/mL)  Admin Duration = Over 20 minutes          < 10 % difference, ok to treat with final dose     Medication = Vincristine (VCR)   Base Dose = 1.4 mg/m2  Calc Dose:Base Dose x 1.86 m2 = 2.6 mg  Final Dose = 2 mg   Route = IV  Fluid & Volume = NSS 25 " mL  Admin Duration = Over 2-10 minutes   capped at 2 mg per protocol      Max dose; ok to treat with final dose     Medication = Cyclophosphamide (Cytoxan)  Base Dose = 750 mg/m2  Calc Dose: Base Dose x 1.86 m2 = 1395 mg  Final Dose = 1395 mg  Route = IV  Fluid & Volume =  mL  Admin Duration = Over 30 minutes          < 10 % difference, ok to treat with final dose       By my signature below, I confirm this process was performed independently with the BSA and all final chemotherapy dosing calculations congruent. I have reviewed the above chemotherapy order and that my calculation of the final dose and BSA (when applicable) corroborate those calculations of the  pharmacist. Discrepancies of 10% or greater in the written dose have been addressed and documented within the EPIC Progress notes.    Danica Wright, PharmD, PharmD

## 2022-10-26 NOTE — PROGRESS NOTES
"Pharmacy Chemotherapy Calculations    Patient Name: Azeem Mejia  Dx: Diffuse Large B-Cell Lymphoma    Cycle 1  Previous treatment = n/a    Regimen: R-CHOP  *Dosing Reference*  riTUXimab 375 mg/m² IV on Day 1  Cyclophosphamide 750 mg/m² IV over 30 minutes on Day 1   DOXOrubicin 50 mg/m² IV push on Day 1   vinCRIStine 1.4 mg/m² (max dose of 2 mg) IV over 5-10 minutes on Dya 1  Predinsone 100 mg PO daily on Days 1-5   21 day cycle for 4-6 cycles   NCCN Guidelines for B-Cell Lymphomas V.4.2022.  Leleffier B, et al. Blood. 2010;116(12):2040-5.  Lashon DO, et al. J Clin Oncol. 2020;38(26):0475-9527.    Allergies:  Seasonal    BP (!) 128/93   Pulse 85   Temp 36.7 °C (98 °F) (Temporal)   Resp 18   Ht 1.65 m (5' 4.96\")   Wt 75.6 kg (166 lb 10.7 oz)   SpO2 98%   BMI 27.77 kg/m²  Body surface area is 1.86 meters squared.     9/22/22 09:14   Hepatitis B Surface Antigen Non-Reactive   Hepatitis B Cors Ab,IgM Non-Reactive     9/30/22 ECHO LVEF ~55%    Labs 10/27/22:  ANC~ 4160 Plt = 264k   Hgb = 15.2     SCr = 0.65 mg/dL CrCl >125 mL/min   LFT's = WNL TBili = 0.5     riTUXImab-abbs (Truxima) 375 mg/m² x 1.86 m² = 697.5 mg  Rounded to nearest vial size (within 10%) per rounding protocol  Okay to treat with final dose  = 700 mg IV    Cyclophosphamide 750 mg/m² x 1.86 m² = 1395 mg   <10% difference, okay to treat with final dose = 1395 mg IV    DOXOrubicin 50 mg/m² x 1.86 m² = 93 mg   <10% difference, okay to treat with final dose = 93 mg IV    vinCRIStine 1.4 mg/m² x 1.86 m² = 2.604 mg (Max dose 2 mg)   <10% difference, okay to treat with final dose = 2 mg IV    Filomena Brownlee, PharmD    "

## 2022-10-27 ENCOUNTER — OUTPATIENT INFUSION SERVICES (OUTPATIENT)
Dept: ONCOLOGY | Facility: MEDICAL CENTER | Age: 52
End: 2022-10-27
Attending: STUDENT IN AN ORGANIZED HEALTH CARE EDUCATION/TRAINING PROGRAM
Payer: COMMERCIAL

## 2022-10-27 ENCOUNTER — PATIENT OUTREACH (OUTPATIENT)
Dept: ONCOLOGY | Facility: MEDICAL CENTER | Age: 52
End: 2022-10-27

## 2022-10-27 ENCOUNTER — DOCUMENTATION (OUTPATIENT)
Dept: ONCOLOGY | Facility: MEDICAL CENTER | Age: 52
End: 2022-10-27
Payer: COMMERCIAL

## 2022-10-27 VITALS
SYSTOLIC BLOOD PRESSURE: 128 MMHG | BODY MASS INDEX: 27.77 KG/M2 | RESPIRATION RATE: 18 BRPM | HEART RATE: 85 BPM | OXYGEN SATURATION: 98 % | HEIGHT: 65 IN | WEIGHT: 166.67 LBS | DIASTOLIC BLOOD PRESSURE: 93 MMHG | TEMPERATURE: 98 F

## 2022-10-27 DIAGNOSIS — C83.31 DIFFUSE LARGE B-CELL LYMPHOMA OF LYMPH NODES OF NECK (HCC): ICD-10-CM

## 2022-10-27 LAB
ALBUMIN SERPL BCP-MCNC: 4.3 G/DL (ref 3.2–4.9)
ALBUMIN/GLOB SERPL: 1.3 G/DL
ALP SERPL-CCNC: 95 U/L (ref 30–99)
ALT SERPL-CCNC: 30 U/L (ref 2–50)
ANION GAP SERPL CALC-SCNC: 10 MMOL/L (ref 7–16)
AST SERPL-CCNC: 30 U/L (ref 12–45)
BASOPHILS # BLD AUTO: 0.9 % (ref 0–1.8)
BASOPHILS # BLD: 0.07 K/UL (ref 0–0.12)
BILIRUB SERPL-MCNC: 0.5 MG/DL (ref 0.1–1.5)
BUN SERPL-MCNC: 18 MG/DL (ref 8–22)
CALCIUM SERPL-MCNC: 9.2 MG/DL (ref 8.5–10.5)
CHLORIDE SERPL-SCNC: 96 MMOL/L (ref 96–112)
CO2 SERPL-SCNC: 25 MMOL/L (ref 20–33)
CREAT SERPL-MCNC: 0.65 MG/DL (ref 0.5–1.4)
EOSINOPHIL # BLD AUTO: 0.46 K/UL (ref 0–0.51)
EOSINOPHIL NFR BLD: 5.8 % (ref 0–6.9)
ERYTHROCYTE [DISTWIDTH] IN BLOOD BY AUTOMATED COUNT: 40.3 FL (ref 35.9–50)
GFR SERPLBLD CREATININE-BSD FMLA CKD-EPI: 113 ML/MIN/1.73 M 2
GLOBULIN SER CALC-MCNC: 3.3 G/DL (ref 1.9–3.5)
GLUCOSE SERPL-MCNC: 112 MG/DL (ref 65–99)
HCT VFR BLD AUTO: 44.5 % (ref 42–52)
HGB BLD-MCNC: 15.2 G/DL (ref 14–18)
IMM GRANULOCYTES # BLD AUTO: 0.03 K/UL (ref 0–0.11)
IMM GRANULOCYTES NFR BLD AUTO: 0.4 % (ref 0–0.9)
LDH SERPL L TO P-CCNC: 266 U/L (ref 107–266)
LYMPHOCYTES # BLD AUTO: 2.56 K/UL (ref 1–4.8)
LYMPHOCYTES NFR BLD: 32.1 % (ref 22–41)
MCH RBC QN AUTO: 33 PG (ref 27–33)
MCHC RBC AUTO-ENTMCNC: 34.2 G/DL (ref 33.7–35.3)
MCV RBC AUTO: 96.5 FL (ref 81.4–97.8)
MONOCYTES # BLD AUTO: 0.7 K/UL (ref 0–0.85)
MONOCYTES NFR BLD AUTO: 8.8 % (ref 0–13.4)
NEUTROPHILS # BLD AUTO: 4.16 K/UL (ref 1.82–7.42)
NEUTROPHILS NFR BLD: 52 % (ref 44–72)
NRBC # BLD AUTO: 0 K/UL
NRBC BLD-RTO: 0 /100 WBC
OUTPT INFUS CBC COMMENT OICOM: ABNORMAL
PLATELET # BLD AUTO: 264 K/UL (ref 164–446)
PMV BLD AUTO: 8.7 FL (ref 9–12.9)
POTASSIUM SERPL-SCNC: 4.3 MMOL/L (ref 3.6–5.5)
PROT SERPL-MCNC: 7.6 G/DL (ref 6–8.2)
RBC # BLD AUTO: 4.61 M/UL (ref 4.7–6.1)
SODIUM SERPL-SCNC: 131 MMOL/L (ref 135–145)
URATE SERPL-MCNC: 7.5 MG/DL (ref 2.5–8.3)
WBC # BLD AUTO: 8 K/UL (ref 4.8–10.8)

## 2022-10-27 PROCEDURE — 96415 CHEMO IV INFUSION ADDL HR: CPT

## 2022-10-27 PROCEDURE — 80053 COMPREHEN METABOLIC PANEL: CPT

## 2022-10-27 PROCEDURE — 96417 CHEMO IV INFUS EACH ADDL SEQ: CPT

## 2022-10-27 PROCEDURE — 700102 HCHG RX REV CODE 250 W/ 637 OVERRIDE(OP): Performed by: STUDENT IN AN ORGANIZED HEALTH CARE EDUCATION/TRAINING PROGRAM

## 2022-10-27 PROCEDURE — 96413 CHEMO IV INFUSION 1 HR: CPT

## 2022-10-27 PROCEDURE — 85025 COMPLETE CBC W/AUTO DIFF WBC: CPT

## 2022-10-27 PROCEDURE — 83615 LACTATE (LD) (LDH) ENZYME: CPT

## 2022-10-27 PROCEDURE — 700105 HCHG RX REV CODE 258: Performed by: STUDENT IN AN ORGANIZED HEALTH CARE EDUCATION/TRAINING PROGRAM

## 2022-10-27 PROCEDURE — 96361 HYDRATE IV INFUSION ADD-ON: CPT

## 2022-10-27 PROCEDURE — 96367 TX/PROPH/DG ADDL SEQ IV INF: CPT

## 2022-10-27 PROCEDURE — 700111 HCHG RX REV CODE 636 W/ 250 OVERRIDE (IP)

## 2022-10-27 PROCEDURE — 96411 CHEMO IV PUSH ADDL DRUG: CPT

## 2022-10-27 PROCEDURE — 84550 ASSAY OF BLOOD/URIC ACID: CPT

## 2022-10-27 PROCEDURE — 700111 HCHG RX REV CODE 636 W/ 250 OVERRIDE (IP): Performed by: STUDENT IN AN ORGANIZED HEALTH CARE EDUCATION/TRAINING PROGRAM

## 2022-10-27 PROCEDURE — A9270 NON-COVERED ITEM OR SERVICE: HCPCS | Performed by: STUDENT IN AN ORGANIZED HEALTH CARE EDUCATION/TRAINING PROGRAM

## 2022-10-27 PROCEDURE — 96375 TX/PRO/DX INJ NEW DRUG ADDON: CPT

## 2022-10-27 PROCEDURE — A4212 NON CORING NEEDLE OR STYLET: HCPCS

## 2022-10-27 RX ORDER — HEPARIN SODIUM (PORCINE) LOCK FLUSH IV SOLN 100 UNIT/ML 100 UNIT/ML
500 SOLUTION INTRAVENOUS PRN
Status: DISCONTINUED | OUTPATIENT
Start: 2022-10-27 | End: 2022-10-27 | Stop reason: HOSPADM

## 2022-10-27 RX ORDER — LIDOCAINE HYDROCHLORIDE 10 MG/ML
INJECTION, SOLUTION EPIDURAL; INFILTRATION; INTRACAUDAL; PERINEURAL
Status: COMPLETED
Start: 2022-10-27 | End: 2022-10-27

## 2022-10-27 RX ORDER — SODIUM CHLORIDE 9 MG/ML
500 INJECTION, SOLUTION INTRAVENOUS ONCE
Status: COMPLETED | OUTPATIENT
Start: 2022-10-27 | End: 2022-10-27

## 2022-10-27 RX ORDER — ACETAMINOPHEN 325 MG/1
650 TABLET ORAL ONCE
Status: COMPLETED | OUTPATIENT
Start: 2022-10-27 | End: 2022-10-27

## 2022-10-27 RX ORDER — PREDNISONE 50 MG/1
50 TABLET ORAL DAILY
COMMUNITY

## 2022-10-27 RX ORDER — PREDNISONE 50 MG/1
100 TABLET ORAL ONCE
Status: DISCONTINUED | OUTPATIENT
Start: 2022-10-27 | End: 2022-10-27 | Stop reason: HOSPADM

## 2022-10-27 RX ADMIN — DOXORUBICIN HYDROCHLORIDE 93 MG: 2 INJECTION, SOLUTION INTRAVENOUS at 17:12

## 2022-10-27 RX ADMIN — CYCLOPHOSPHAMIDE 1395 MG: 1 INJECTION, POWDER, FOR SOLUTION INTRAVENOUS; ORAL at 15:38

## 2022-10-27 RX ADMIN — FOSAPREPITANT 150 MG: 150 INJECTION, POWDER, LYOPHILIZED, FOR SOLUTION INTRAVENOUS at 10:33

## 2022-10-27 RX ADMIN — HEPARIN 500 UNITS: 100 SYRINGE at 18:23

## 2022-10-27 RX ADMIN — SODIUM CHLORIDE 500 ML: 9 INJECTION, SOLUTION INTRAVENOUS at 16:56

## 2022-10-27 RX ADMIN — ONDANSETRON 16 MG: 2 INJECTION INTRAMUSCULAR; INTRAVENOUS at 10:11

## 2022-10-27 RX ADMIN — RITUXIMAB-ABBS: 10 INJECTION, SOLUTION INTRAVENOUS at 11:24

## 2022-10-27 RX ADMIN — VINCRISTINE SULFATE 2 MG: 1 INJECTION, SOLUTION INTRAVENOUS at 17:56

## 2022-10-27 RX ADMIN — DIPHENHYDRAMINE HYDROCHLORIDE 25 MG: 50 INJECTION, SOLUTION INTRAMUSCULAR; INTRAVENOUS at 09:58

## 2022-10-27 RX ADMIN — LIDOCAINE HYDROCHLORIDE 0.5 ML: 10 INJECTION, SOLUTION EPIDURAL; INFILTRATION; INTRACAUDAL; PERINEURAL at 08:44

## 2022-10-27 RX ADMIN — ACETAMINOPHEN 650 MG: 325 TABLET ORAL at 09:58

## 2022-10-27 RX ADMIN — SODIUM CHLORIDE 500 ML: 9 INJECTION, SOLUTION INTRAVENOUS at 09:25

## 2022-10-27 ASSESSMENT — FIBROSIS 4 INDEX: FIB4 SCORE: 1.15

## 2022-10-27 NOTE — PROGRESS NOTES
Chemotherapy Verification - PRIMARY RN    C1 D1    Height = 165 cm  Weight = 75.6 kg  BSA = 1.86 m^2  Echocardiogram on 9/30/22 LVEF 55%      Medication: Rituximab-abbs  Dose: 375 mg/m^2  Calculated Dose: 697.5 mg                             (In mg/m2, AUC, mg/kg)     Medication: Cyclophosphamide (Cytoxan)  Dose: 750 mg/m^2  Calculated Dose: 1,395 mg                             (In mg/m2, AUC, mg/kg)    Medication: Doxorubicin (Adriamycin)  Dose: 50 mg/m^2  Calculated Dose: 93 mg                             (In mg/m2, AUC, mg/kg)    Medication: Vincristine  Dose: set dose 2mg  Calculated Dose: set dose 2 mg                             (In mg/m2, AUC, mg/kg)    I confirm this process was performed independently with the BSA and all final chemotherapy dosing calculations congruent.  Any discrepancies of 10% or greater have been addressed with the chemotherapy pharmacist. The resolution of the discrepancy has been documented in the EPIC progress notes.        [facility] Hospitalist Progress Note     PCP: Thania Bender MD    CC: Follow up       Assessment/Plan:     Maria Ines Bishop is a 61year old female with PMH sig for HTN, anemia who presented with high grade SBO.     Now s/p ex lap with extensive lysis of adhesions kg)  11/13/20 0921 : 197 lb (89.4 kg)  09/10/20 0753 : 189 lb (85.7 kg)      Exam  Gen: No acute distress, alert and oriented, no facial droop, speech clear  HEENT: MMM  Pulm: CTA b/l  CV: RRR   Abd: Abdomen tender as expected post op, +BS  MSK: nonedemato upper quadrant. This may represent postprocedural change. A seroma or other postoperative fluid collection could be possible. This could be better assessed with CT if clinically indicated.     Dictated by (CST): Girish Lang MD on 11/15/2020 at 1:47 PM lower lobe atelectasis/consolidation when compared to 11/12/2020 CT. 3. Post surgical changes with mild pneumoperitoneum. Small to moderate amount of hemorrhage within the pelvis.   Improvement of small bowel dilatation and obstruction noted on 11/12/2020

## 2022-10-27 NOTE — PROGRESS NOTES
Pt arrives to [facility] for cycle 1 day 1 of R-CHOP.  Pt is accompanied by his sister, Regina.  Discussed plan of care and possible adverse reactions with pt.  Reviewed how to take Prednisone and prn anti-emetics.  Pt verbalized understanding.  Pt denies s/sx of infection, nausea or pain today. RUC port was placed on 10/24/22 and tegaderm/steri-strips are in place.  Tegaderm removed prior to post access. RUC port accessed with sterile technique, flushed with NS and brisk blood return observed.  Labs drawn.  Pre-hydration initiated.  Labs reviewed and results meet parameters for treatment.  Pre-medications given.  Rituximab infused at initial rates.  Max rate of 400mg/hr.  Rituximab completed without adverse reaction.  Pt was drowsy from Benadryl and rested throughout Rituximab.  Cytoxan infused over 1 hour without issue.  Port has positive blood return prior to giving Adriamycin and Vincristine.  Port flushed with NS and heparin locked.  Bailey needle removed intact.  Site covered with gauze/tape.  Confirmed next appt on 11/17/22 with pt.  Pt will discuss with Dr. Kwan if he could move treatment day to a Monday or Tuesday.  Pt dc home with his sister as tranpsortation.

## 2022-10-27 NOTE — PROGRESS NOTES
"[facility] Intake    Diagnosis Type: Lymphoma   Preferred Language: English  Insurance:[employer]  Dental Insurance:Yes; Last Appointment Date:\"August 2022\"   Primary Care Provider Reviewed: yes  Last Appointment Date:\"August 2022\"  Introduced Earl Mejia to Oncology Support Services and provided contact information on 10/27/2022 .  Current Barriers Identified Include: None at this time  MyChart Status: Activated, uses often.  Communication Preferences: MyChart; Best Contact Number:[phone]  Patient Contact's Reviewed: yes     Met with patient and patient's sister, Regina, at [facility] infusion center during treatment appointment. Pt. States he has family and friends as his support system. Educated pt. On [email or web address]/events web page for Cancer Support Groups and reviewed the [facility] Center handout. Pt. Was also provided [facility]EC[facility] Boyce's and [employer] Wil[facility]'s card for contact information.     Outcome:  Pt. Has no further needs at this time.          "

## 2022-10-27 NOTE — PROGRESS NOTES
Nutrition Services: RD Consultation/ New Chemo Start  Azeemcyndi Mejia is a 52 y.o. male with diagnosis of Diffuse large B-cell lymphoma of lymph nodes of neck    Past Medical History:   Diagnosis Date    Asthma     Cancer (HCC) 10/21/2022    left submandibular lymphoma large B cell    Dental disorder 10/21/2022    upper partial, lower left front times 1 cap    Diffuse large B-cell lymphoma of lymph nodes of neck (HCC)     Hyperlipidemia        Past Surgical History:   Procedure Laterality Date    MN DX BONE MARROW ASPIRATIONS Left 9/27/2022    Procedure: BONE MARROW CORE AND ASPIRATION;  Surgeon: Subhash Mathews M.D.;  Location: SURGERY SAME DAY [facility];  Service: Orthopedics    MN DX BONE MARROW BIOPSIES Left 9/27/2022    Procedure: BIOPSY, BONE MARROW, USING NEEDLE OR TROCAR - FLEMING;  Surgeon: Subhash Mathews M.D.;  Location: SURGERY SAME DAY [facility];  Service: Orthopedics    NASAL POLYPECTOMY Right        RD met with pt to assess current intake, appetite, and nutritional status.  Pt presents to appointment with his sister Dana. Per pt, he has a great appetite and states he is eating healthy. He reports he has not had any recent nausea or vomiting. He states he has regular BM's with no diarrhea or constipation. 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 He reports sleeping 4-7 hours nightly. He states he normally eats 2 meals daily with snacks between meals. Pt reports he does not avoid any foods, have any food allergies and is currently not taking any supplements. Per pt, he has a family history of diabetes and watches his carbohydrate intake. Pt reports he sometimes follows a BRAT diet when he is experiencing any nausea or vomiting. He states [place] activity but has not been going on walks recently. Pt did not express any further nutrition-related questions or concerns at this time.     Dietary intake pattern:    B: black  "coffee, water w/ apple cider vinegar, bagel with cheese, eggs  D: spaghetti with chicken and steamed vegetables, spring rolls or fried eggs rolls cooked in air fryer  Snacks: chips, dried fruit, ice cream, trail mix, chocolate bars, sometimes a smoothie (w/ berries, oatmilk, spinach, beets, bananas, peanut butter, avocado)  Drinks: 2 L water, water with apple cider vinegar, Vital collagen protein w/ coffee or water    Biochemical/ Anthropometric Assessment:  Treatment Regimen: Chemo (rituximab and CHOP)  Pertinent Labs (10/27): sodium 131, glucose 112, triglycerides 238 (10/26)  Pertinent Meds: prednisone, compazine, Zofran, allopurinol  Wt Readings from Last 1 Encounters:   10/27/22 75.6 kg (166 lb 10.7 oz)      Ht Readings from Last 1 Encounters:   10/27/22 1.65 m (5' 4.96\")      BMI Readings from Last 1 Encounters:   10/27/22 27.77 kg/m²   BMI Classification: overweight  Nutrition-Focused Physical Exam: Pt appears nourished    Weight History:  Wt Readings from Last 6 Encounters:   10/27/22 75.6 kg (166 lb 10.7 oz)   10/24/22 76.5 kg (168 lb 10.4 oz)   10/04/22 74.6 kg (164 lb 9.2 oz)   09/27/22 75 kg (165 lb 5.5 oz)   09/22/22 76.4 kg (168 lb 5.1 oz)   09/19/22 75.7 kg (166 lb 12.5 oz)       Weight Change/Malnutrition Risk: Based on weight history in chart, pt's weight appears stable in the past 1 year.     Interventions:  Introduced self and discussed role of dietitian throughout treatment process   Provided and discussed handout regarding general nutrition guidelines as well as nutritional recommendations for patient's with cancer, including tips/tricks should side effects of tx occur.   Encouraged balanced diet with plant-based focus. Advised reducing or eliminating red/ processed meats as well as foods high in saturated fats, sodium, and added sugars as able. Reviewed sources. Verbalized importance of nutrition and maintaining LBM throughout treatment.   Encouraged physical activity as tolerated with emphasis " on reducing long periods of sedentary activity as able.   Increase meal and snack frequency to 4-6 x/day.   Focus on high calorie and protein foods, fruits and vegetables with all meals/snacks - handout provided.  Incorporate boost plus or comparable protein supplement.    Pt reports understanding and was receptive to information provided.   RD provided contact information. Encouraged pt to reach out as questions/concerns arise.   RD available [phone]

## 2022-10-27 NOTE — PROGRESS NOTES
Chemotherapy Verification - SECONDARY RN       Height = 1.65 m  Weight = 75.6 kg  BSA = 1.86 m^2       Medication: rituximab-abbs  Dose: 375 mg/m^2  Calculated Dose: 697.5 mg                             (In mg/m2, AUC, mg/kg)     Medication: cyclophosphamide  Dose: 750 mg/m^2  Calculated Dose: 1395 mg                             (In mg/m2, AUC, mg/kg)    Medication: doxorubicin  Dose: 50 mg/m^2  Calculated Dose: 93 mg                             (In mg/m2, AUC, mg/kg)    Medication: vincritstine  Dose: 2 mg (set dose)  Calculated Dose: 2 mg (set dose)                             (In mg/m2, AUC, mg/kg)    I confirm that this process was performed independently.

## 2022-11-03 ENCOUNTER — HOSPITAL ENCOUNTER (OUTPATIENT)
Dept: HEMATOLOGY ONCOLOGY | Facility: MEDICAL CENTER | Age: 52
End: 2022-11-03
Attending: STUDENT IN AN ORGANIZED HEALTH CARE EDUCATION/TRAINING PROGRAM
Payer: COMMERCIAL

## 2022-11-03 VITALS
RESPIRATION RATE: 16 BRPM | DIASTOLIC BLOOD PRESSURE: 82 MMHG | SYSTOLIC BLOOD PRESSURE: 114 MMHG | HEIGHT: 65 IN | TEMPERATURE: 98 F | WEIGHT: 167.33 LBS | OXYGEN SATURATION: 97 % | HEART RATE: 87 BPM | BODY MASS INDEX: 27.88 KG/M2

## 2022-11-03 DIAGNOSIS — T45.1X5A CHEMOTHERAPY INDUCED NAUSEA AND VOMITING: ICD-10-CM

## 2022-11-03 DIAGNOSIS — R11.2 CHEMOTHERAPY INDUCED NAUSEA AND VOMITING: ICD-10-CM

## 2022-11-03 DIAGNOSIS — C83.31 DIFFUSE LARGE B-CELL LYMPHOMA OF LYMPH NODES OF NECK (HCC): ICD-10-CM

## 2022-11-03 PROCEDURE — 99214 OFFICE O/P EST MOD 30 MIN: CPT | Performed by: STUDENT IN AN ORGANIZED HEALTH CARE EDUCATION/TRAINING PROGRAM

## 2022-11-03 RX ORDER — 0.9 % SODIUM CHLORIDE 0.9 %
10 VIAL (ML) INJECTION PRN
Status: CANCELLED | OUTPATIENT
Start: 2022-11-16

## 2022-11-03 RX ORDER — 0.9 % SODIUM CHLORIDE 0.9 %
3 VIAL (ML) INJECTION PRN
Status: CANCELLED | OUTPATIENT
Start: 2022-12-08

## 2022-11-03 RX ORDER — HEPARIN SODIUM (PORCINE) LOCK FLUSH IV SOLN 100 UNIT/ML 100 UNIT/ML
500 SOLUTION INTRAVENOUS PRN
Status: CANCELLED | OUTPATIENT
Start: 2022-11-17

## 2022-11-03 RX ORDER — MEPERIDINE HYDROCHLORIDE 25 MG/ML
25 INJECTION INTRAMUSCULAR; INTRAVENOUS; SUBCUTANEOUS PRN
Status: CANCELLED | OUTPATIENT
Start: 2022-11-17 | End: 2022-11-19

## 2022-11-03 RX ORDER — 0.9 % SODIUM CHLORIDE 0.9 %
VIAL (ML) INJECTION PRN
Status: CANCELLED | OUTPATIENT
Start: 2022-12-08

## 2022-11-03 RX ORDER — EPINEPHRINE 1 MG/ML(1)
0.5 AMPUL (ML) INJECTION PRN
Status: CANCELLED | OUTPATIENT
Start: 2022-12-08

## 2022-11-03 RX ORDER — PREDNISONE 50 MG/1
100 TABLET ORAL ONCE
Status: CANCELLED | OUTPATIENT
Start: 2022-11-17 | End: 2022-11-17

## 2022-11-03 RX ORDER — 0.9 % SODIUM CHLORIDE 0.9 %
10 VIAL (ML) INJECTION PRN
Status: CANCELLED | OUTPATIENT
Start: 2022-11-17

## 2022-11-03 RX ORDER — ONDANSETRON 8 MG/1
8 TABLET, ORALLY DISINTEGRATING ORAL PRN
Status: CANCELLED | OUTPATIENT
Start: 2022-11-17

## 2022-11-03 RX ORDER — DIPHENHYDRAMINE HYDROCHLORIDE 50 MG/ML
50 INJECTION INTRAMUSCULAR; INTRAVENOUS PRN
Status: CANCELLED | OUTPATIENT
Start: 2022-12-08

## 2022-11-03 RX ORDER — 0.9 % SODIUM CHLORIDE 0.9 %
10 VIAL (ML) INJECTION PRN
Status: CANCELLED | OUTPATIENT
Start: 2022-12-07

## 2022-11-03 RX ORDER — ACETAMINOPHEN 325 MG/1
650 TABLET ORAL ONCE
Status: CANCELLED | OUTPATIENT
Start: 2022-12-08 | End: 2022-12-08

## 2022-11-03 RX ORDER — MEPERIDINE HYDROCHLORIDE 25 MG/ML
25 INJECTION INTRAMUSCULAR; INTRAVENOUS; SUBCUTANEOUS PRN
Status: CANCELLED | OUTPATIENT
Start: 2022-12-08 | End: 2022-12-10

## 2022-11-03 RX ORDER — 0.9 % SODIUM CHLORIDE 0.9 %
VIAL (ML) INJECTION PRN
Status: CANCELLED | OUTPATIENT
Start: 2022-11-16

## 2022-11-03 RX ORDER — HEPARIN SODIUM (PORCINE) LOCK FLUSH IV SOLN 100 UNIT/ML 100 UNIT/ML
500 SOLUTION INTRAVENOUS PRN
Status: CANCELLED | OUTPATIENT
Start: 2022-11-16

## 2022-11-03 RX ORDER — METHYLPREDNISOLONE SODIUM SUCCINATE 125 MG/2ML
125 INJECTION, POWDER, LYOPHILIZED, FOR SOLUTION INTRAMUSCULAR; INTRAVENOUS PRN
Status: CANCELLED | OUTPATIENT
Start: 2022-11-17

## 2022-11-03 RX ORDER — SODIUM CHLORIDE 9 MG/ML
500 INJECTION, SOLUTION INTRAVENOUS ONCE
Status: CANCELLED | OUTPATIENT
Start: 2022-11-17 | End: 2022-11-17

## 2022-11-03 RX ORDER — PREDNISONE 50 MG/1
100 TABLET ORAL ONCE
Status: CANCELLED | OUTPATIENT
Start: 2022-12-08 | End: 2022-12-08

## 2022-11-03 RX ORDER — DIPHENHYDRAMINE HYDROCHLORIDE 50 MG/ML
25 INJECTION INTRAMUSCULAR; INTRAVENOUS PRN
Status: CANCELLED | OUTPATIENT
Start: 2022-12-08 | End: 2022-12-10

## 2022-11-03 RX ORDER — SODIUM CHLORIDE 9 MG/ML
500 INJECTION, SOLUTION INTRAVENOUS ONCE
Status: CANCELLED | OUTPATIENT
Start: 2022-11-17

## 2022-11-03 RX ORDER — HEPARIN SODIUM (PORCINE) LOCK FLUSH IV SOLN 100 UNIT/ML 100 UNIT/ML
500 SOLUTION INTRAVENOUS PRN
Status: CANCELLED | OUTPATIENT
Start: 2022-12-08

## 2022-11-03 RX ORDER — ACETAMINOPHEN 325 MG/1
650 TABLET ORAL PRN
Status: CANCELLED | OUTPATIENT
Start: 2022-11-17 | End: 2022-11-19

## 2022-11-03 RX ORDER — SODIUM CHLORIDE 9 MG/ML
INJECTION, SOLUTION INTRAVENOUS CONTINUOUS
Status: CANCELLED | OUTPATIENT
Start: 2022-12-08

## 2022-11-03 RX ORDER — ACETAMINOPHEN 325 MG/1
650 TABLET ORAL ONCE
Status: CANCELLED | OUTPATIENT
Start: 2022-11-17 | End: 2022-11-17

## 2022-11-03 RX ORDER — 0.9 % SODIUM CHLORIDE 0.9 %
10 VIAL (ML) INJECTION PRN
Status: CANCELLED | OUTPATIENT
Start: 2022-12-08

## 2022-11-03 RX ORDER — ACETAMINOPHEN 325 MG/1
650 TABLET ORAL PRN
Status: CANCELLED | OUTPATIENT
Start: 2022-12-08 | End: 2022-12-10

## 2022-11-03 RX ORDER — DIPHENHYDRAMINE HYDROCHLORIDE 50 MG/ML
25 INJECTION INTRAMUSCULAR; INTRAVENOUS PRN
Status: CANCELLED | OUTPATIENT
Start: 2022-11-17 | End: 2022-11-19

## 2022-11-03 RX ORDER — HEPARIN SODIUM (PORCINE) LOCK FLUSH IV SOLN 100 UNIT/ML 100 UNIT/ML
500 SOLUTION INTRAVENOUS PRN
Status: CANCELLED | OUTPATIENT
Start: 2022-12-07

## 2022-11-03 RX ORDER — 0.9 % SODIUM CHLORIDE 0.9 %
VIAL (ML) INJECTION PRN
Status: CANCELLED | OUTPATIENT
Start: 2022-11-17

## 2022-11-03 RX ORDER — SODIUM CHLORIDE 9 MG/ML
INJECTION, SOLUTION INTRAVENOUS CONTINUOUS
Status: CANCELLED | OUTPATIENT
Start: 2022-11-17

## 2022-11-03 RX ORDER — 0.9 % SODIUM CHLORIDE 0.9 %
3 VIAL (ML) INJECTION PRN
Status: CANCELLED | OUTPATIENT
Start: 2022-11-16

## 2022-11-03 RX ORDER — 0.9 % SODIUM CHLORIDE 0.9 %
3 VIAL (ML) INJECTION PRN
Status: CANCELLED | OUTPATIENT
Start: 2022-11-17

## 2022-11-03 RX ORDER — EPINEPHRINE 1 MG/ML(1)
0.5 AMPUL (ML) INJECTION PRN
Status: CANCELLED | OUTPATIENT
Start: 2022-11-17

## 2022-11-03 RX ORDER — ONDANSETRON 2 MG/ML
4 INJECTION INTRAMUSCULAR; INTRAVENOUS PRN
Status: CANCELLED | OUTPATIENT
Start: 2022-12-08

## 2022-11-03 RX ORDER — SODIUM CHLORIDE 9 MG/ML
500 INJECTION, SOLUTION INTRAVENOUS ONCE
Status: CANCELLED | OUTPATIENT
Start: 2022-12-08 | End: 2022-12-08

## 2022-11-03 RX ORDER — ONDANSETRON 2 MG/ML
4 INJECTION INTRAMUSCULAR; INTRAVENOUS PRN
Status: CANCELLED | OUTPATIENT
Start: 2022-11-17

## 2022-11-03 RX ORDER — PROCHLORPERAZINE MALEATE 10 MG
10 TABLET ORAL EVERY 6 HOURS PRN
Status: CANCELLED | OUTPATIENT
Start: 2022-11-17

## 2022-11-03 RX ORDER — SODIUM CHLORIDE 9 MG/ML
500 INJECTION, SOLUTION INTRAVENOUS ONCE
Status: CANCELLED | OUTPATIENT
Start: 2022-12-08

## 2022-11-03 RX ORDER — ONDANSETRON 8 MG/1
8 TABLET, ORALLY DISINTEGRATING ORAL PRN
Status: CANCELLED | OUTPATIENT
Start: 2022-12-08

## 2022-11-03 RX ORDER — 0.9 % SODIUM CHLORIDE 0.9 %
VIAL (ML) INJECTION PRN
Status: CANCELLED | OUTPATIENT
Start: 2022-12-07

## 2022-11-03 RX ORDER — PROCHLORPERAZINE MALEATE 10 MG
10 TABLET ORAL EVERY 6 HOURS PRN
Status: CANCELLED | OUTPATIENT
Start: 2022-12-08

## 2022-11-03 RX ORDER — 0.9 % SODIUM CHLORIDE 0.9 %
3 VIAL (ML) INJECTION PRN
Status: CANCELLED | OUTPATIENT
Start: 2022-12-07

## 2022-11-03 RX ORDER — METHYLPREDNISOLONE SODIUM SUCCINATE 125 MG/2ML
125 INJECTION, POWDER, LYOPHILIZED, FOR SOLUTION INTRAMUSCULAR; INTRAVENOUS PRN
Status: CANCELLED | OUTPATIENT
Start: 2022-12-08

## 2022-11-03 RX ORDER — DIPHENHYDRAMINE HYDROCHLORIDE 50 MG/ML
50 INJECTION INTRAMUSCULAR; INTRAVENOUS PRN
Status: CANCELLED | OUTPATIENT
Start: 2022-11-17

## 2022-11-03 ASSESSMENT — ENCOUNTER SYMPTOMS
NECK PAIN: 0
FOCAL WEAKNESS: 0
ABDOMINAL PAIN: 0
BLURRED VISION: 0
COUGH: 0
SPUTUM PRODUCTION: 0
FEVER: 0
HEARTBURN: 0
SORE THROAT: 0
MEMORY LOSS: 0
NAUSEA: 0
HEADACHES: 0
TINGLING: 0
ORTHOPNEA: 0
TREMORS: 0
BRUISES/BLEEDS EASILY: 0
WHEEZING: 0
SHORTNESS OF BREATH: 0
CHILLS: 0
WEIGHT LOSS: 0
PALPITATIONS: 0
DEPRESSION: 0
VOMITING: 0
DIZZINESS: 0
SENSORY CHANGE: 0

## 2022-11-03 ASSESSMENT — PAIN SCALES - GENERAL: PAINLEVEL: NO PAIN

## 2022-11-03 ASSESSMENT — FIBROSIS 4 INDEX: FIB4 SCORE: 1.1

## 2022-11-03 NOTE — PROGRESS NOTES
Consult Note: Hematology/Oncology     Referring Provider: Peggy Kinsey  Primary Care:  Carlos Mcnamara M.D.    Chief Complaint   Patient presents with    Cancer     Tox check       Current Treatment:   10/27/22 C1D1 [facility]     Prior Treatment: None    Subjective:   History of Presenting Illness:  Azeem Mejia is a 52 y.o. male who presents today with a new diagnosis of diffuse large B-cell lymphoma.    Patient was originally seen by his PCP on August 17, 2022 with a submandibular lymphadenopathy.  He reported a 2-week history of lymphadenopathy.  Blood work at that time showed no abnormalities.  Patient underwent an ultrasound the same day which showed a 3.1 x 1.7 x 2.1 cm lymph node on the left submandibular region.  He was referred to Peggy on August 30, 2022 for submandibular lymphadenopathy work-up.    Patient underwent a CT with contrast which showed a 2.2 x 2.5 x 3.4 cm mass.  And he underwent a biopsy on September 7 with initial pathology showing a B-cell lymphoproliferative disorder with dim CD10 expression.  FISH analysis was negative for CCN D1 IGH, Bcl-2, BCL6, MYC.  Final path showed diffuse large B-cell lymphoma with germinal center B type.  C-Myc was negative.  Patient is not a double hit expresser.    He works in [employer].  He was a major in literature. 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     Interval History:    Patient reports that he is doing well. Patient had his port placed the Monday prior to chemotherapy.  He said other than being sore, he tolerated the procedure well. He tolerated his first cycle of chemotherapy, but reported some nausea.  He took zofran which controlled the nausea.  No diarrhea with the regimen. He denies any F/C/NS.  No unexplained WL.  He continues to drink + water, eats prunes, dates. He is almost back to baseline and will be returning  to work tonight.     Past Medical History:   Diagnosis Date    Asthma     Cancer (HCC) 10/21/2022    left submandibular lymphoma large B cell    Dental disorder 10/21/2022    upper partial, lower left front times 1 cap    Diffuse large B-cell lymphoma of lymph nodes of neck (HCC)     Hyperlipidemia         Past Surgical History:   Procedure Laterality Date    GA DX BONE MARROW ASPIRATIONS Left 9/27/2022    Procedure: BONE MARROW CORE AND ASPIRATION;  Surgeon: Subhash Mathews M.D.;  Location: SURGERY SAME DAY [facility];  Service: Orthopedics    GA DX BONE MARROW BIOPSIES Left 9/27/2022    Procedure: BIOPSY, BONE MARROW, USING NEEDLE OR TROCAR - FLEMING;  Surgeon: Subhash Mathews M.D.;  Location: SURGERY SAME DAY [facility];  Service: Orthopedics    NASAL POLYPECTOMY Right        Social History     Tobacco Use    Smoking status: Never    Smokeless tobacco: Never   Vaping Use    Vaping Use: Never used   Substance Use Topics    Alcohol use: Yes     Comment: very rare    Drug use: Not Currently        Family History   Problem Relation Age of Onset    Diabetes Father     Asthma Sister     Hyperlipidemia Neg Hx     Hypertension Neg Hx     Cancer Neg Hx        Allergies   Allergen Reactions    Seasonal Runny Nose and Itching     Eyes and nose       Current Outpatient Medications   Medication Sig Dispense Refill    predniSONE (DELTASONE) 50 MG Tab Take 50 mg by mouth every day. Take 2 tablets (100mg) on days 1-5 of chemotherapy      acetaminophen (TYLENOL) 500 MG Tab Take 500 mg by mouth every 6 hours as needed. Indications: Pain      prochlorperazine (COMPAZINE) 10 MG Tab Take 1 Tablet by mouth every 6 hours as needed (for nausea, vomiting). 30 Tablet 6    ondansetron (ZOFRAN) 4 MG Tab tablet Take 1 Tablet by mouth every four hours as needed for Nausea/Vomiting (for nausea, vomiting). 30 Tablet 6    allopurinol (ZYLOPRIM) 300 MG Tab Take 1 Tablet by mouth every day. 30 Tablet 5    Pseudoephedrine HCl (SUDAFED 12 HOUR PO) Take 1  "Tablet by mouth as needed (For allergies).       No current facility-administered medications for this encounter.       Review of Systems   Constitutional:  Positive for malaise/fatigue. Negative for chills, fever and weight loss.   HENT:  Negative for congestion, ear pain, nosebleeds and sore throat.    Eyes:  Negative for blurred vision.   Respiratory:  Negative for cough, sputum production, shortness of breath and wheezing.    Cardiovascular:  Negative for chest pain, palpitations, orthopnea and leg swelling.   Gastrointestinal:  Negative for abdominal pain, heartburn, nausea and vomiting.   Genitourinary:  Negative for dysuria, frequency and urgency.   Musculoskeletal:  Negative for neck pain.   Neurological:  Negative for dizziness, tingling, tremors, sensory change, focal weakness and headaches.   Endo/Heme/Allergies:  Does not bruise/bleed easily.   Psychiatric/Behavioral:  Negative for depression, memory loss and suicidal ideas.    All other systems reviewed and are negative.    Problem list, medications, and allergies reviewed by myself today in Epic.     Objective:     Vitals:    11/03/22 1329   BP: 114/82   BP Location: Left arm   Patient Position: Sitting   BP Cuff Size: Adult   Pulse: 87   Resp: 16   Temp: 36.7 °C (98 °F)   TempSrc: Temporal   SpO2: 97%   Weight: 75.9 kg (167 lb 5.3 oz)   Height: 1.65 m (5' 4.96\")       DESC; KARNOFSKY SCALE WITH ECOG EQUIVALENT: 90, Able to carry on normal activity; minor signs or symptoms of disease (ECOG equivalent 0)    DISTRESS LEVEL: no apparent distress    Physical Exam  Constitutional:       General: He is not in acute distress.     Appearance: Normal appearance.      Comments: Port in place, no sign of infection   HENT:      Head: Normocephalic and atraumatic.      Nose: Nose normal. No congestion.      Mouth/Throat:      Mouth: Mucous membranes are moist.      Pharynx: Oropharynx is clear.   Eyes:      General: No scleral icterus.     Conjunctiva/sclera: " Conjunctivae normal.      Pupils: Pupils are equal, round, and reactive to light.   Cardiovascular:      Rate and Rhythm: Normal rate and regular rhythm.      Pulses: Normal pulses.      Heart sounds: No murmur heard.    No friction rub.   Pulmonary:      Effort: Pulmonary effort is normal. No respiratory distress.      Breath sounds: Normal breath sounds. No stridor. No wheezing or rales.   Chest:      Chest wall: No tenderness.   Abdominal:      General: Abdomen is flat. Bowel sounds are normal. There is no distension.      Palpations: Abdomen is soft. There is no mass.      Tenderness: There is no abdominal tenderness. There is no guarding or rebound.   Musculoskeletal:         General: No swelling, tenderness or deformity. Normal range of motion.      Cervical back: Normal range of motion and neck supple. No rigidity or tenderness.      Right lower leg: No edema.      Left lower leg: No edema.   Skin:     General: Skin is warm.      Coloration: Skin is not jaundiced or pale.      Findings: No bruising or rash.   Neurological:      General: No focal deficit present.      Mental Status: He is alert and oriented to person, place, and time. Mental status is at baseline.      Motor: No weakness.   Psychiatric:         Mood and Affect: Mood normal.         Behavior: Behavior normal.         Thought Content: Thought content normal.         Judgment: Judgment normal.       Labs:   Most recent labs reviewed.  CBC and CMP  grossly normal.  HIV negative hepatitis B negative LDH normal uric acid normal     Imaging:   Most recent images below have been independently reviewed by me.      ECHO  Normal left ventricular size, thickness, and systolic function.  The left ventricular ejection fraction is visually estimated to be 55%.  Mild hypokinesis basal posterior wall otherwise normal regional wall   motion.  Normal right ventricular size and systolic function.  No prior study is available for comparison    PET SCAN    1.  Interval decrease in size of the left submandibular lymph node mass, measuring 1.3 x 1.9 cm, previously 2.5 x 3.5 cm. The mass demonstrates mild increased uptake (SUV max 5.6 )     2. Additional uptake in the nonenlarged left level 2/3 lymph nodes (SUV max 6.7) and right level 2 lymph nodes (SUV max 5.3) could relate to lymphoma as well  Pathology:  FINAL DIAGNOSIS:     A. Left submandibular node core biopsy:          Diffuse large B cell lymphoma, GCB type.          C-MYC IHC is negative which shows this is not a double           expressor.     Synoptic Summary for Non-Hodgkin Lymphoma/Lymphoid Neoplasms:          -Specimen: Lymph node        -Procedure: Core biopsy        -Tumor site: Left submandibular        -Histologic type: Diffuse large B cell lymphoma, GCB        -Immunophenotyping:            Flow cytometry: Performed; please refer to microscopic             description for details.            Immunohistochemistry: Performed; please refer to microscopic             description for details.        -Cytogenetic Studies: Not performed.        -Molecular Genetic Studies: FISH is negative for Bcl-2, Bcl-6,         C-MYC and t(11;14)- negative for double/triple hit.            Comment: One area of the core shows lower grade cells that have           a lower Ki-67 of   10% and may either represent either only a           partially involved node or an area of lower grade lymphoma such           as follicular lymphoma.  If this distinction is clinically           important than an excision of the whole node is necessary for           evaluation.  This case has been reviewed by a second           pathologist, Dr. Villanueva, who concurs with the diagnosis.       Bone Marrow Biopsy  FINAL DIAGNOSIS:     Peripheral blood smear and CBC:          Normal white blood cell count demonstrating a mild relative           monocytosis.   Bone marrow aspirate, clot, and core biopsy:          -Normal cellular bone marrow  demonstrating:          -There is no morphologic, immunohistochemical or flow cytometric           evidence for bone marrow involvement by lymphoma.          -Trilineage hematopoiesis with maturation.          -No morphologic increase in blast cells.          -Focal dysmegakaryocytopoiesis (<10%).          -Mild increased polyclonal plasma cells.          -Adequate stainable bone marrow iron stores.          See comment.         Assessment/Plan:     Cancer Staging  Diffuse large B-cell lymphoma of lymph nodes of neck (HCC)  Staging form: Hodgkin and Non-Hodgkin Lymphoma, AJCC 8th Edition  - Clinical stage from 10/4/2022: Stage IIE (Diffuse large B-cell lymphoma) - Signed by Henna Kwan M.D. on 10/4/2022         Mr. Mejia is a 51 yo M who presents today for toxicity check for RCHOP after Cycle 1 for Stage 2 DLBCL.     Again we discussed that we will proceed with R-CHOP x3 cycles with interim restaging with PET scan after 3 cycles.  The subsequent treatment will depend on his response.  If patient has a complete response we will proceed with 1 additional cycle of R-CHOP.  If patient has a partial response then we will proceed with R-CHOP x1-3 additional cycles with consideration for ISRT. if patient has progressive disease then we will repeat biopsy.    Patient will see Peggy prior to Cycle 2.     Regimen  21-day cycles for 3 cycles followed by PET scan  Cyclophosphamide 750 mg per metered squared IV 30 minutes on day 1 (patient should receive a combination of oral and IV hydration roughly 3 L on day of chemo)  Doxorubicin 50 mg per metered squared IV push on day 1  Vincristine 1.4 mg/m² with a maximum of 2 mg IV over 10 minutes on day 1  Prednisone 100 mg p.o. daily's on days 1 through 5  Rituximab 375 mg/m² on day 1    Reference  1.  NCCN guidelines for B-cell lymphoma V.4.2022  2. Coiffier B et al. Blood 2010:116(12):2040-5  3. Lashon DO et al. J Clin Onc 2020:38(26):3432-0582      Any questions and  concerns raised by the patient were addressed and answered. Patient denies any further questions.  Patient encouraged to call the office with any concerns or issues.     Henna Kwan M.D.  Hematology/Oncology    34 minutes spent on this case

## 2022-11-03 NOTE — ADDENDUM NOTE
Encounter addended by: Henna Kwna M.D. on: 11/3/2022 2:14 PM   Actions taken: SmartForm saved, Clinical Note Signed

## 2022-11-14 NOTE — PROGRESS NOTES
"Pharmacy chemotherapy verification:    Patient Name: Azeem Mejia    Dx: DLBCL     Protocol: R-CHOP   *Dosing Reference*  Rituximab (Rituxan) 375 mg/m2 IV on Day 1   Cyclophophamide (Cytoxan) 750 mg/m2 IV on Day 1   Doxorubicin (Adriamycin) 50 mg/m2 iv on Day 1   Vincristine 1.4 mg/m2 ( max 2 mg ) iv on Day 1   Prednisone 100 mg po qday on days 1-5 - pt to take at home as ordered  Repeat every 21 days x 4 to 6 cycles   NCCN Guidelines for B Cell Lymphomas V.4.2022  Coianne B, et al, Blood. 2010 Sep 23;116(12):2040-5.  Lashon DO, et al -J Clin Oncol 2020 Sep 10;38(26):5031-1946.    Allergies:  Seasonal     /83   Pulse 77   Temp 35.9 °C (96.7 °F) (Temporal)   Resp 18   Ht 1.65 m (5' 4.96\")   Wt 78 kg (171 lb 15.3 oz)   SpO2 94%   BMI 28.65 kg/m²  Body surface area is 1.89 meters squared.    Labs 11/17/22:  ANC~ 1880 Plt = 337k   Hgb = 13     SCr = 0.62 mg/dL CrCl >125 mL/min   AST/ALT/AP = 28/26/105 TBili = 0.3        9/22/22 09:14   Hepatitis B Surface Antigen Non-Reactive   Hepatitis B Cors Ab,IgM Non-Reactive     9/30/22 ECHO LVEF ~ 55 %      Drug Order   (Drug name, dose, route, IV Fluid & volume, frequency, number of doses) Cycle: 2  Previous treatment: Cycle 1 10/27/22     Medication = Rituximab-abbs (Truxima)  Base Dose = 375 mg/m2  Calc Dose: Base Dose x 1.89 m2 = 708.8 mg  Final Dose = 700 mg  Route = IV  Fluid & Volume =  mL  Admin Duration = See rate sheet          Rounded to vial size (within 10%) per dose rounding protocol; ok to treat with final dose     Medication = Doxorubicin (Adriamycin)  Base Dose = 50 mg/m2  Calc Dose: Base Dose x 1.89 m2 = 94.5 mg  Final Dose = 94.5 mg  Route = IV  Fluid & Volume = 47.25 mL (2 mg/mL)  Admin Duration = Over 20 minutes          < 10 % difference, ok to treat with final dose     Medication = Vincristine (VCR)   Base Dose = 1.4 mg/m2  Calc Dose:Base Dose x 1.89 m2 = 2.65 mg  Final Dose = 2 mg  Route = IV  Fluid & Volume = NS 25 " mL  Admin Duration = Over 2-10 minutes   capped at 2 mg per protocol      Max dose; ok to treat with final dose     Medication = Cyclophosphamide (Cytoxan)  Base Dose = 750 mg/m2  Calc Dose: Base Dose x 1.89 m2 = 1417.5 mg  Final Dose = 1417.6 mg  Route = IV  Fluid & Volume =  mL  Admin Duration = Over 30 minutes          < 10 % difference, ok to treat with final dose       By my signature below, I confirm this process was performed independently with the BSA and all final chemotherapy dosing calculations congruent. I have reviewed the above chemotherapy order and that my calculation of the final dose and BSA (when applicable) corroborate those calculations of the  pharmacist. Discrepancies of 10% or greater in the written dose have been addressed and documented within the EPIC Progress notes.    Mahad Montesinos, PharmD

## 2022-11-16 ENCOUNTER — DOCUMENTATION (OUTPATIENT)
Dept: ONCOLOGY | Facility: MEDICAL CENTER | Age: 52
End: 2022-11-16
Payer: COMMERCIAL

## 2022-11-16 ENCOUNTER — HOSPITAL ENCOUNTER (OUTPATIENT)
Dept: HEMATOLOGY ONCOLOGY | Facility: MEDICAL CENTER | Age: 52
End: 2022-11-16
Attending: NURSE PRACTITIONER
Payer: COMMERCIAL

## 2022-11-16 VITALS
WEIGHT: 171.85 LBS | BODY MASS INDEX: 29.34 KG/M2 | DIASTOLIC BLOOD PRESSURE: 88 MMHG | RESPIRATION RATE: 19 BRPM | TEMPERATURE: 98.3 F | SYSTOLIC BLOOD PRESSURE: 135 MMHG | HEART RATE: 97 BPM | HEIGHT: 64 IN | OXYGEN SATURATION: 96 %

## 2022-11-16 DIAGNOSIS — Z79.899 ENCOUNTER FOR LONG-TERM (CURRENT) USE OF HIGH-RISK MEDICATION: ICD-10-CM

## 2022-11-16 DIAGNOSIS — C83.31 DIFFUSE LARGE B-CELL LYMPHOMA OF LYMPH NODES OF NECK (HCC): ICD-10-CM

## 2022-11-16 PROCEDURE — 99212 OFFICE O/P EST SF 10 MIN: CPT | Performed by: NURSE PRACTITIONER

## 2022-11-16 PROCEDURE — 99214 OFFICE O/P EST MOD 30 MIN: CPT | Performed by: NURSE PRACTITIONER

## 2022-11-16 RX ORDER — LIDOCAINE AND PRILOCAINE 25; 25 MG/G; MG/G
CREAM TOPICAL
Qty: 30 G | Refills: 3 | Status: SHIPPED | OUTPATIENT
Start: 2022-11-16

## 2022-11-16 ASSESSMENT — ENCOUNTER SYMPTOMS
MYALGIAS: 0
PALPITATIONS: 0
VOMITING: 0
CHILLS: 0
CONSTIPATION: 0
DIARRHEA: 0
COUGH: 0
WHEEZING: 0
SHORTNESS OF BREATH: 0
FEVER: 0
NAUSEA: 0
DIZZINESS: 0
WEIGHT LOSS: 0

## 2022-11-16 ASSESSMENT — PAIN SCALES - GENERAL: PAINLEVEL: NO PAIN

## 2022-11-16 ASSESSMENT — FIBROSIS 4 INDEX: FIB4 SCORE: 1.1

## 2022-11-16 NOTE — PROGRESS NOTES
Nutrition Services: Brief Update- Signing Off  Wt Readings from Last 7 Encounters:   11/03/22 75.9 kg (167 lb 5.3 oz)   10/27/22 75.6 kg (166 lb 10.7 oz)   10/24/22 76.5 kg (168 lb 10.4 oz)   10/04/22 74.6 kg (164 lb 9.2 oz)   09/27/22 75 kg (165 lb 5.5 oz)   09/22/22 76.4 kg (168 lb 5.1 oz)   09/19/22 75.7 kg (166 lb 12.5 oz)     Weight Change: wt remaining stable x 2 months     Weight remaining desirably stable. No nutrition needs requested or indicated at this time as a result of weight stability. RD will remain available PRN. Pt previously provided RD information at previous visits. Please re-consult RD as needed per pt preferences or if nutrition status changes.      Please contact [phone]

## 2022-11-16 NOTE — PROGRESS NOTES
Subjective     Earl Mejia is a 52 y.o. male who presents with Lymphoma (Prechemo)          HPI    Patient seen today in follow-up for diffuse large B-cell lymphoma.  Patient presents accompanied by his sister.    Oncology history of presenting illness:  Patient presented to his PCP on 8/17/2022.  He stated he noticed a lump under his chin approximately 2-3 weeks ago. Patient had labs completed on 8/17/2022.  There was no evidence of leukocytosis or anemia.  PCP also did inflammatory markers which were all negative.  Patient was sent for an ultrasound completed on 8/17/2022 which showed a 3.1 x 1.7 x 2.1 cm lymph node on the left.  There are some benign-appearing mildly enlarged lymph nodes on the right.  CT with contrast completed on 8/31/2022 showed a 2.2 x 2.5 x 3.4 cm mass in the left.  Reading radiologist stated that this is likely an abnormal enlarged lymph node and recommendation for biopsy.  Patient underwent biopsy on 9/7/2022 of the left submandibular lymph node, 1 jar of Formalin x 3 cores; 1 RPMI x 1 core obtained and sent to pathology for analysis. Initially preliminary path showed a B-cell lymphoproliferative disorder with a dim CD10 expression. Flow cytometry is consistent with a CD10 positive B-cell lymphoproliferative disorder as well. FISH analysis was negative for CCND1-IGH, BCL2, BCL6, and MYC. Patient returns today for the final path results. 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  C-MYC IHC is negative which shows that this is not a double hit expresser.     Treatment history:  10/27/22: C1D1 RCHOP  11/17/22: C2D1 RCHOP    Interval history:  Patient seen today prior to cycle 2 of R-CHOP.  Patient is doing very well since his first cycle of chemotherapy.  He states he felt quite fatigued for the first 3 days or so after his first cycle but he just completed working before his chemotherapy.  He denies any nausea vomiting.  He does have some mild  "constipation but that is resolved with diet.  He is voiding without difficulty and denies any pain.  He denies chest pain, heart palpitations, coughing, wheezing or shortness of breath.        Allergies   Allergen Reactions    Seasonal Runny Nose and Itching     Eyes and nose       Current Outpatient Medications on File Prior to Encounter   Medication Sig Dispense Refill    predniSONE (DELTASONE) 50 MG Tab Take 1 Tablet by mouth every day. Take 2 tablets (100mg) on days 1-5 of chemotherapy      acetaminophen (TYLENOL) 500 MG Tab Take 1 Tablet by mouth every 6 hours as needed. Indications: Pain      prochlorperazine (COMPAZINE) 10 MG Tab Take 1 Tablet by mouth every 6 hours as needed (for nausea, vomiting). 30 Tablet 6    ondansetron (ZOFRAN) 4 MG Tab tablet Take 1 Tablet by mouth every four hours as needed for Nausea/Vomiting (for nausea, vomiting). 30 Tablet 6    allopurinol (ZYLOPRIM) 300 MG Tab Take 1 Tablet by mouth every day. 30 Tablet 5    Pseudoephedrine HCl (SUDAFED 12 HOUR PO) Take 1 Tablet by mouth as needed (For allergies).       No current facility-administered medications on file prior to encounter.            Review of Systems   Constitutional:  Negative for chills, fever, malaise/fatigue and weight loss.   Respiratory:  Negative for cough, shortness of breath and wheezing.    Cardiovascular:  Negative for chest pain and palpitations.   Gastrointestinal:  Negative for constipation, diarrhea, nausea and vomiting.   Genitourinary:  Negative for dysuria.   Musculoskeletal:  Negative for myalgias.   Neurological:  Negative for dizziness.            Objective     /88   Pulse 97   Temp 36.8 °C (98.3 °F) (Temporal)   Resp 19   Ht 1.626 m (5' 4\")   Wt 78 kg (171 lb 13.6 oz)   SpO2 96%   BMI 29.50 kg/m²      Physical Exam  Vitals reviewed.   Constitutional:       General: He is not in acute distress.     Appearance: Normal appearance. He is not diaphoretic.   HENT:      Head: Normocephalic and " atraumatic.   Cardiovascular:      Rate and Rhythm: Normal rate and regular rhythm.      Heart sounds: Normal heart sounds. No murmur heard.    No friction rub. No gallop.   Pulmonary:      Effort: Pulmonary effort is normal. No respiratory distress.      Breath sounds: Normal breath sounds. No wheezing.   Abdominal:      General: Bowel sounds are normal. There is no distension.      Palpations: Abdomen is soft.      Tenderness: There is no abdominal tenderness.   Musculoskeletal:         General: No swelling or tenderness. Normal range of motion.   Skin:     General: Skin is warm and dry.   Neurological:      Mental Status: He is alert and oriented to person, place, and time.   Psychiatric:         Mood and Affect: Mood normal.         Behavior: Behavior normal.                   Assessment & Plan       1. Diffuse large B-cell lymphoma of lymph nodes of neck (HCC)  lidocaine-prilocaine (EMLA) 2.5-2.5 % Cream    [id number] BASE TO MID-THIGH      2. Encounter for long-term (current) use of high-risk medication             Clinically patient is doing very well.  He is scheduled to proceed with cycle 2 of R-CHOP.  If labs meet parameters patient is okay to proceed with treatment as planned.    Discussed with patient that requested from Dr. Kwan is patient to have a PET scan after the completion of 3 cycles.  I have ordered the PET scan and working on getting that scheduled to be completed after his third cycle.  He will follow-up with Dr. Kwan at that time to determine whether patient will require additional treatment.    Patient to follow-up in the clinic in 3 weeks, or sooner if needed.      Please note that this dictation was created using voice recognition software. I have made every reasonable attempt to correct obvious errors, but I expect that there are errors of grammar and possibly content that I did not discover before finalizing the note.

## 2022-11-17 ENCOUNTER — OUTPATIENT INFUSION SERVICES (OUTPATIENT)
Dept: ONCOLOGY | Facility: MEDICAL CENTER | Age: 52
End: 2022-11-17
Attending: STUDENT IN AN ORGANIZED HEALTH CARE EDUCATION/TRAINING PROGRAM
Payer: COMMERCIAL

## 2022-11-17 VITALS
TEMPERATURE: 96.7 F | RESPIRATION RATE: 18 BRPM | DIASTOLIC BLOOD PRESSURE: 83 MMHG | HEIGHT: 65 IN | BODY MASS INDEX: 28.65 KG/M2 | HEART RATE: 77 BPM | WEIGHT: 171.96 LBS | OXYGEN SATURATION: 94 % | SYSTOLIC BLOOD PRESSURE: 118 MMHG

## 2022-11-17 DIAGNOSIS — C83.31 DIFFUSE LARGE B-CELL LYMPHOMA OF LYMPH NODES OF NECK (HCC): ICD-10-CM

## 2022-11-17 LAB
ALBUMIN SERPL BCP-MCNC: 4.1 G/DL (ref 3.2–4.9)
ALBUMIN/GLOB SERPL: 1.1 G/DL
ALP SERPL-CCNC: 105 U/L (ref 30–99)
ALT SERPL-CCNC: 26 U/L (ref 2–50)
ANION GAP SERPL CALC-SCNC: 10 MMOL/L (ref 7–16)
AST SERPL-CCNC: 28 U/L (ref 12–45)
BASOPHILS # BLD AUTO: 1.1 % (ref 0–1.8)
BASOPHILS # BLD: 0.05 K/UL (ref 0–0.12)
BILIRUB SERPL-MCNC: 0.3 MG/DL (ref 0.1–1.5)
BUN SERPL-MCNC: 13 MG/DL (ref 8–22)
CALCIUM SERPL-MCNC: 9.3 MG/DL (ref 8.5–10.5)
CHLORIDE SERPL-SCNC: 102 MMOL/L (ref 96–112)
CO2 SERPL-SCNC: 25 MMOL/L (ref 20–33)
CREAT SERPL-MCNC: 0.62 MG/DL (ref 0.5–1.4)
EOSINOPHIL # BLD AUTO: 0.05 K/UL (ref 0–0.51)
EOSINOPHIL NFR BLD: 1.1 % (ref 0–6.9)
ERYTHROCYTE [DISTWIDTH] IN BLOOD BY AUTOMATED COUNT: 41.6 FL (ref 35.9–50)
GFR SERPLBLD CREATININE-BSD FMLA CKD-EPI: 114 ML/MIN/1.73 M 2
GLOBULIN SER CALC-MCNC: 3.6 G/DL (ref 1.9–3.5)
GLUCOSE SERPL-MCNC: 106 MG/DL (ref 65–99)
HCT VFR BLD AUTO: 39.1 % (ref 42–52)
HGB BLD-MCNC: 13 G/DL (ref 14–18)
IMM GRANULOCYTES # BLD AUTO: 0.13 K/UL (ref 0–0.11)
IMM GRANULOCYTES NFR BLD AUTO: 2.8 % (ref 0–0.9)
LYMPHOCYTES # BLD AUTO: 1.65 K/UL (ref 1–4.8)
LYMPHOCYTES NFR BLD: 35.2 % (ref 22–41)
MCH RBC QN AUTO: 32.1 PG (ref 27–33)
MCHC RBC AUTO-ENTMCNC: 33.2 G/DL (ref 33.7–35.3)
MCV RBC AUTO: 96.5 FL (ref 81.4–97.8)
MONOCYTES # BLD AUTO: 0.93 K/UL (ref 0–0.85)
MONOCYTES NFR BLD AUTO: 19.8 % (ref 0–13.4)
NEUTROPHILS # BLD AUTO: 1.88 K/UL (ref 1.82–7.42)
NEUTROPHILS NFR BLD: 40 % (ref 44–72)
NRBC # BLD AUTO: 0 K/UL
NRBC BLD-RTO: 0 /100 WBC
OUTPT INFUS CBC COMMENT OICOM: ABNORMAL
PLATELET # BLD AUTO: 337 K/UL (ref 164–446)
PMV BLD AUTO: 8.8 FL (ref 9–12.9)
POTASSIUM SERPL-SCNC: 3.7 MMOL/L (ref 3.6–5.5)
PROT SERPL-MCNC: 7.7 G/DL (ref 6–8.2)
RBC # BLD AUTO: 4.05 M/UL (ref 4.7–6.1)
SODIUM SERPL-SCNC: 137 MMOL/L (ref 135–145)
WBC # BLD AUTO: 4.7 K/UL (ref 4.8–10.8)

## 2022-11-17 PROCEDURE — 96415 CHEMO IV INFUSION ADDL HR: CPT

## 2022-11-17 PROCEDURE — 85025 COMPLETE CBC W/AUTO DIFF WBC: CPT

## 2022-11-17 PROCEDURE — 96375 TX/PRO/DX INJ NEW DRUG ADDON: CPT

## 2022-11-17 PROCEDURE — A4212 NON CORING NEEDLE OR STYLET: HCPCS

## 2022-11-17 PROCEDURE — 700101 HCHG RX REV CODE 250: Performed by: STUDENT IN AN ORGANIZED HEALTH CARE EDUCATION/TRAINING PROGRAM

## 2022-11-17 PROCEDURE — 96411 CHEMO IV PUSH ADDL DRUG: CPT

## 2022-11-17 PROCEDURE — 700105 HCHG RX REV CODE 258: Performed by: STUDENT IN AN ORGANIZED HEALTH CARE EDUCATION/TRAINING PROGRAM

## 2022-11-17 PROCEDURE — 96413 CHEMO IV INFUSION 1 HR: CPT

## 2022-11-17 PROCEDURE — A9270 NON-COVERED ITEM OR SERVICE: HCPCS | Performed by: STUDENT IN AN ORGANIZED HEALTH CARE EDUCATION/TRAINING PROGRAM

## 2022-11-17 PROCEDURE — 700102 HCHG RX REV CODE 250 W/ 637 OVERRIDE(OP): Performed by: STUDENT IN AN ORGANIZED HEALTH CARE EDUCATION/TRAINING PROGRAM

## 2022-11-17 PROCEDURE — 96417 CHEMO IV INFUS EACH ADDL SEQ: CPT

## 2022-11-17 PROCEDURE — 96367 TX/PROPH/DG ADDL SEQ IV INF: CPT

## 2022-11-17 PROCEDURE — 700111 HCHG RX REV CODE 636 W/ 250 OVERRIDE (IP): Performed by: STUDENT IN AN ORGANIZED HEALTH CARE EDUCATION/TRAINING PROGRAM

## 2022-11-17 PROCEDURE — 80053 COMPREHEN METABOLIC PANEL: CPT

## 2022-11-17 RX ORDER — ACETAMINOPHEN 325 MG/1
650 TABLET ORAL ONCE
Status: COMPLETED | OUTPATIENT
Start: 2022-11-17 | End: 2022-11-17

## 2022-11-17 RX ORDER — SODIUM CHLORIDE 9 MG/ML
500 INJECTION, SOLUTION INTRAVENOUS ONCE
Status: COMPLETED | OUTPATIENT
Start: 2022-11-17 | End: 2022-11-17

## 2022-11-17 RX ORDER — PREDNISONE 50 MG/1
100 TABLET ORAL ONCE
Status: COMPLETED | OUTPATIENT
Start: 2022-11-17 | End: 2022-11-17

## 2022-11-17 RX ORDER — HEPARIN SODIUM (PORCINE) LOCK FLUSH IV SOLN 100 UNIT/ML 100 UNIT/ML
500 SOLUTION INTRAVENOUS PRN
Status: DISCONTINUED | OUTPATIENT
Start: 2022-11-17 | End: 2022-11-17 | Stop reason: HOSPADM

## 2022-11-17 RX ADMIN — VINCRISTINE SULFATE 2 MG: 1 INJECTION, SOLUTION INTRAVENOUS at 15:08

## 2022-11-17 RX ADMIN — HEPARIN 500 UNITS: 100 SYRINGE at 16:13

## 2022-11-17 RX ADMIN — ONDANSETRON 16 MG: 2 INJECTION INTRAMUSCULAR; INTRAVENOUS at 10:00

## 2022-11-17 RX ADMIN — LIDOCAINE HYDROCHLORIDE 0.5 ML: 10 INJECTION, SOLUTION EPIDURAL; INFILTRATION; INTRACAUDAL; PERINEURAL at 08:27

## 2022-11-17 RX ADMIN — CYCLOPHOSPHAMIDE 1417.6 MG: 1 INJECTION, POWDER, FOR SOLUTION INTRAVENOUS; ORAL at 13:37

## 2022-11-17 RX ADMIN — DIPHENHYDRAMINE HYDROCHLORIDE 25 MG: 50 INJECTION, SOLUTION INTRAMUSCULAR; INTRAVENOUS at 09:47

## 2022-11-17 RX ADMIN — RITUXIMAB-ABBS: 10 INJECTION, SOLUTION INTRAVENOUS at 11:07

## 2022-11-17 RX ADMIN — SODIUM CHLORIDE 500 ML: 9 INJECTION, SOLUTION INTRAVENOUS at 09:29

## 2022-11-17 RX ADMIN — FOSAPREPITANT 150 MG: 150 INJECTION, POWDER, LYOPHILIZED, FOR SOLUTION INTRAVENOUS at 10:29

## 2022-11-17 RX ADMIN — ACETAMINOPHEN 650 MG: 325 TABLET ORAL at 09:49

## 2022-11-17 RX ADMIN — DOXORUBICIN HYDROCHLORIDE 94.5 MG: 2 INJECTION, SOLUTION INTRAVENOUS at 14:35

## 2022-11-17 RX ADMIN — SODIUM CHLORIDE 500 ML: 9 INJECTION, SOLUTION INTRAVENOUS at 15:20

## 2022-11-17 RX ADMIN — PREDNISONE 100 MG: 50 TABLET ORAL at 09:48

## 2022-11-17 ASSESSMENT — FIBROSIS 4 INDEX: FIB4 SCORE: 1.1

## 2022-11-17 NOTE — PROGRESS NOTES
Chemotherapy Verification - SECONDARY RN       Height = 1.65m  Weight = 78kg  BSA = 1.89m2       Medication: rituximab-abbs  Dose: 375mg/m2  Calculated Dose: 708.75mg                             (In mg/m2, AUC, mg/kg)     Medication: cyclophosphamide  Dose: 750mg/m2  Calculated Dose: 1417.5mg                             (In mg/m2, AUC, mg/kg)    Medication:  doxorubicin Dose: 50mg/m2  Calculated Dose:  94.5mg                            (In mg/m2, AUC, mg/kg)    Medication: vincristine  Dose: set by rule   Calculated Dose: 2mg                             (In mg/m2, AUC, mg/kg)      I confirm that this process was performed independently.

## 2022-11-17 NOTE — PROGRESS NOTES
Chemotherapy Verification - PRIMARY RN      Height = 1.65 m  Weight = 78 kg  BSA = 1.89 m2       Medication: Rituximab  Dose: 375mg/m2  Calculated Dose: 708.75 mg                             (In mg/m2, AUC, mg/kg)     Medication: cyclophosphamide  Dose: 750mg/m2  Calculated Dose: 1417.5 mg                             (In mg/m2, AUC, mg/kg)    Medication: Doxorubicin  Dose: 50mg/m2  Calculated Dose: 94.5 mg                             (In mg/m2, AUC, mg/kg)    Medication: Vincristine  Dose: 2 mg2  Calculated Dose: 2 mg                             (In mg/m2, AUC, mg/kg)      I confirm this process was performed independently with the BSA and all final chemotherapy dosing calculations congruent.  Any discrepancies of 10% or greater have been addressed with the chemotherapy pharmacist. The resolution of the discrepancy has been documented in the EPIC progress notes.

## 2022-11-17 NOTE — PROGRESS NOTES
Pt is here today for R-CHOP chemotherapy. He has no new concerns to report.     Medi port to right chest accessed. Pt requested lidocaine. Flushed well with good blood return. Labs drawn per orders.    Pre meds given per MAR, tolerated well.  Chemotherapy infusions tolerated well.     Medi port was flushed and HL. Bailey needle was removed, intact. Gauze and tape to site.     Pt discharged in stable condition, left with his sister.           I confirm this process was performed independently with the BSA and all final chemotherapy dosing calculations congruent.  Any discrepancies of 10% or greater have been addressed with the chemotherapy pharmacist. The resolution of the discrepancy has been documented in the EPIC progress notes.

## 2022-11-17 NOTE — PROGRESS NOTES
"Pharmacy Chemotherapy Calculations    Patient Name: Azeem Mejia  Dx: Diffuse Large B-Cell Lymphoma    Cycle 2  Previous treatment = C1 on 10/27/22    Regimen: R-CHOP  *Dosing Reference*  riTUXimab 375 mg/m² IV on Day 1  Cyclophosphamide 750 mg/m² IV over 30 minutes on Day 1   DOXOrubicin 50 mg/m² IV push on Day 1   vinCRIStine 1.4 mg/m² (max dose of 2 mg) IV over 5-10 minutes on Dya 1  Predinsone 100 mg PO daily on Days 1-5   21 day cycle for 4-6 cycles   NCCN Guidelines for B-Cell Lymphomas V.4.2022.  Coiffier B, et al. Blood. 2010;116(12):2040-5.  Lashon DO, et al. J Clin Oncol. 2020;38(26):0890-0180.    Allergies:  Seasonal    /83   Pulse 77   Temp 35.9 °C (96.7 °F) (Temporal)   Resp 18   Ht 1.65 m (5' 4.96\")   Wt 78 kg (171 lb 15.3 oz)   SpO2 94%   BMI 28.65 kg/m²  Body surface area is 1.89 meters squared.     9/22/22 09:14   Hepatitis B Surface Antigen Non-Reactive   Hepatitis B Cors Ab,IgM Non-Reactive     9/30/22 ECHO LVEF ~55%    Labs 11/17/22:  ANC~ 1880 Plt = 337k   Hgb = 13     SCr = 0.62 mg/dL CrCl > 125mL/min   AST/ALT/ALK  = 28/26/105 TBili = 0.3     riTUXImab-abbs (Truxima) 375 mg/m² x 1.89 m² = 708.75 mg  Rounded to nearest vial size (within 10%) per rounding protocol  Okay to treat with final dose  = 700 mg IV    Cyclophosphamide 750 mg/m² x 1.89 m² = 1417.5 mg   <10% difference, okay to treat with final dose = 1417.6 mg IV    DOXOrubicin 50 mg/m² x 1.89 m² = 94.5 mg   <10% difference, okay to treat with final dose = 94.5 mg IV    vinCRIStine 1.4 mg/m² x 1.89 m² = 2.646 mg (Max dose 2 mg)   <10% difference, okay to treat with final dose = 2 mg IV    Filomena Brownlee, PharmD    "

## 2022-11-30 DIAGNOSIS — C83.31 DIFFUSE LARGE B-CELL LYMPHOMA OF LYMPH NODES OF NECK (HCC): ICD-10-CM

## 2022-11-30 DIAGNOSIS — Z91.89 AT HIGH RISK OF TUMOR LYSIS SYNDROME: ICD-10-CM

## 2022-11-30 RX ORDER — ALLOPURINOL 300 MG/1
300 TABLET ORAL DAILY
Qty: 30 TABLET | Refills: 5 | Status: SHIPPED
Start: 2022-11-30 | End: 2023-03-20

## 2022-12-07 ENCOUNTER — HOSPITAL ENCOUNTER (OUTPATIENT)
Dept: HEMATOLOGY ONCOLOGY | Facility: MEDICAL CENTER | Age: 52
End: 2022-12-07
Attending: NURSE PRACTITIONER
Payer: COMMERCIAL

## 2022-12-07 ENCOUNTER — OUTPATIENT INFUSION SERVICES (OUTPATIENT)
Dept: ONCOLOGY | Facility: MEDICAL CENTER | Age: 52
End: 2022-12-07
Attending: STUDENT IN AN ORGANIZED HEALTH CARE EDUCATION/TRAINING PROGRAM
Payer: COMMERCIAL

## 2022-12-07 VITALS
WEIGHT: 169.97 LBS | DIASTOLIC BLOOD PRESSURE: 74 MMHG | OXYGEN SATURATION: 94 % | HEIGHT: 65 IN | HEART RATE: 72 BPM | BODY MASS INDEX: 28.32 KG/M2 | TEMPERATURE: 96.8 F | RESPIRATION RATE: 16 BRPM | SYSTOLIC BLOOD PRESSURE: 113 MMHG

## 2022-12-07 VITALS
SYSTOLIC BLOOD PRESSURE: 116 MMHG | OXYGEN SATURATION: 96 % | DIASTOLIC BLOOD PRESSURE: 86 MMHG | HEART RATE: 79 BPM | HEIGHT: 64 IN | WEIGHT: 169.09 LBS | BODY MASS INDEX: 28.87 KG/M2 | RESPIRATION RATE: 18 BRPM | TEMPERATURE: 97.5 F

## 2022-12-07 DIAGNOSIS — C83.31 DIFFUSE LARGE B-CELL LYMPHOMA OF LYMPH NODES OF NECK (HCC): ICD-10-CM

## 2022-12-07 DIAGNOSIS — Z79.899 ENCOUNTER FOR LONG-TERM (CURRENT) USE OF HIGH-RISK MEDICATION: ICD-10-CM

## 2022-12-07 LAB
ALBUMIN SERPL BCP-MCNC: 4.2 G/DL (ref 3.2–4.9)
ALBUMIN/GLOB SERPL: 1.3 G/DL
ALP SERPL-CCNC: 82 U/L (ref 30–99)
ALT SERPL-CCNC: 45 U/L (ref 2–50)
ANION GAP SERPL CALC-SCNC: 10 MMOL/L (ref 7–16)
AST SERPL-CCNC: 37 U/L (ref 12–45)
BASOPHILS # BLD AUTO: 1.8 % (ref 0–1.8)
BASOPHILS # BLD: 0.05 K/UL (ref 0–0.12)
BILIRUB SERPL-MCNC: 0.4 MG/DL (ref 0.1–1.5)
BUN SERPL-MCNC: 7 MG/DL (ref 8–22)
CALCIUM SERPL-MCNC: 9.1 MG/DL (ref 8.5–10.5)
CHLORIDE SERPL-SCNC: 98 MMOL/L (ref 96–112)
CO2 SERPL-SCNC: 23 MMOL/L (ref 20–33)
CREAT SERPL-MCNC: 0.59 MG/DL (ref 0.5–1.4)
EOSINOPHIL # BLD AUTO: 0.05 K/UL (ref 0–0.51)
EOSINOPHIL NFR BLD: 1.8 % (ref 0–6.9)
ERYTHROCYTE [DISTWIDTH] IN BLOOD BY AUTOMATED COUNT: 45.8 FL (ref 35.9–50)
GFR SERPLBLD CREATININE-BSD FMLA CKD-EPI: 116 ML/MIN/1.73 M 2
GLOBULIN SER CALC-MCNC: 3.2 G/DL (ref 1.9–3.5)
GLUCOSE SERPL-MCNC: 104 MG/DL (ref 65–99)
HCT VFR BLD AUTO: 40.4 % (ref 42–52)
HGB BLD-MCNC: 13.8 G/DL (ref 14–18)
IMM GRANULOCYTES # BLD AUTO: 0.04 K/UL (ref 0–0.11)
IMM GRANULOCYTES NFR BLD AUTO: 1.4 % (ref 0–0.9)
LYMPHOCYTES # BLD AUTO: 1 K/UL (ref 1–4.8)
LYMPHOCYTES NFR BLD: 36.1 % (ref 22–41)
MCH RBC QN AUTO: 32.5 PG (ref 27–33)
MCHC RBC AUTO-ENTMCNC: 34.2 G/DL (ref 33.7–35.3)
MCV RBC AUTO: 95.3 FL (ref 81.4–97.8)
MONOCYTES # BLD AUTO: 0.85 K/UL (ref 0–0.85)
MONOCYTES NFR BLD AUTO: 30.7 % (ref 0–13.4)
NEUTROPHILS # BLD AUTO: 0.78 K/UL (ref 1.82–7.42)
NEUTROPHILS NFR BLD: 28.2 % (ref 44–72)
NRBC # BLD AUTO: 0 K/UL
NRBC BLD-RTO: 0 /100 WBC
OUTPT INFUS CBC COMMENT OICOM: ABNORMAL
PLATELET # BLD AUTO: 402 K/UL (ref 164–446)
PMV BLD AUTO: 8.7 FL (ref 9–12.9)
POTASSIUM SERPL-SCNC: 3.8 MMOL/L (ref 3.6–5.5)
PROT SERPL-MCNC: 7.4 G/DL (ref 6–8.2)
RBC # BLD AUTO: 4.24 M/UL (ref 4.7–6.1)
SODIUM SERPL-SCNC: 131 MMOL/L (ref 135–145)
WBC # BLD AUTO: 2.8 K/UL (ref 4.8–10.8)

## 2022-12-07 PROCEDURE — 99212 OFFICE O/P EST SF 10 MIN: CPT | Performed by: NURSE PRACTITIONER

## 2022-12-07 PROCEDURE — 36415 COLL VENOUS BLD VENIPUNCTURE: CPT

## 2022-12-07 PROCEDURE — 99213 OFFICE O/P EST LOW 20 MIN: CPT | Performed by: NURSE PRACTITIONER

## 2022-12-07 PROCEDURE — 80053 COMPREHEN METABOLIC PANEL: CPT

## 2022-12-07 PROCEDURE — 85025 COMPLETE CBC W/AUTO DIFF WBC: CPT

## 2022-12-07 ASSESSMENT — ENCOUNTER SYMPTOMS
MYALGIAS: 0
HEADACHES: 0
DIAPHORESIS: 0
SHORTNESS OF BREATH: 0
CHILLS: 0
INSOMNIA: 1
FEVER: 0
CONSTIPATION: 0
NAUSEA: 0
VOMITING: 0
PALPITATIONS: 0
DIZZINESS: 0
DIARRHEA: 0
TINGLING: 1
WEIGHT LOSS: 1
WHEEZING: 0
COUGH: 0

## 2022-12-07 ASSESSMENT — PAIN SCALES - GENERAL: PAINLEVEL: NO PAIN

## 2022-12-07 ASSESSMENT — FIBROSIS 4 INDEX
FIB4 SCORE: 0.86
FIB4 SCORE: 0.86

## 2022-12-07 NOTE — PROGRESS NOTES
Subjective     Earl Mejia is a 52 y.o. male who presents with Cancer (Prechemo)          HPI    Patient seen today in follow-up for diffuse large B-cell lymphoma.  Patient presents accompanied by his sister.     Oncology history of presenting illness:  Patient presented to his PCP on 8/17/2022.  He stated he noticed a lump under his chin approximately 2-3 weeks ago. Patient had labs completed on 8/17/2022.  There was no evidence of leukocytosis or anemia.  PCP also did inflammatory markers which were all negative.  Patient was sent for an ultrasound completed on 8/17/2022 which showed a 3.1 x 1.7 x 2.1 cm lymph node on the left.  There are some benign-appearing mildly enlarged lymph nodes on the right.  CT with contrast completed on 8/31/2022 showed a 2.2 x 2.5 x 3.4 cm mass in the left.  Reading radiologist stated that this is likely an abnormal enlarged lymph node and recommendation for biopsy.  Patient underwent biopsy on 9/7/2022 of the left submandibular lymph node, 1 jar of Formalin x 3 cores; 1 RPMI x 1 core obtained and sent to pathology for analysis. Initially preliminary path showed a B-cell lymphoproliferative disorder with a dim CD10 expression. Flow cytometry is consistent with a CD10 positive B-cell lymphoproliferative disorder as well. FISH analysis was negative for CCND1-IGH, BCL2, BCL6, and MYC. Patient returns today for the final path results. 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  C-MYC IHC is negative which shows that this is not a double hit expresser.      Treatment history:  10/27/22: C1D1 RCHOP  11/17/22: C2D1 RCHOP  12/08/22: C3D1 RCHOP     Interval history:  Patient seen today prior to cycle 3 of R-CHOP.  Patient continues to do very well with treatment.  He is tolerating it with minimal side effects.  He has noticed in the last few days some neuropathy in his fingertips and toes and heels.  This is approximately grade 1 as it does come and  go.  He still working full-time night shift and does have fatigue associated with the days that he works.  His energy is okay and rests as needed.  He did have constipation with his first cycle but he has increased fiber in his diet and his constipation has resolved.  He is noticing darkness of his nails.  Patient denies any nausea or vomiting.        Allergies   Allergen Reactions    Seasonal Runny Nose and Itching     Eyes and nose     Current Outpatient Medications on File Prior to Encounter   Medication Sig Dispense Refill    allopurinol (ZYLOPRIM) 300 MG Tab Take 1 Tablet by mouth every day. 30 Tablet 5    lidocaine-prilocaine (EMLA) 2.5-2.5 % Cream Apply to port 1 hour prior to access of port and cover with plastic wrap. 30 g 3    predniSONE (DELTASONE) 50 MG Tab Take 1 Tablet by mouth every day. Take 2 tablets (100mg) on days 1-5 of chemotherapy      acetaminophen (TYLENOL) 500 MG Tab Take 1 Tablet by mouth every 6 hours as needed. Indications: Pain      prochlorperazine (COMPAZINE) 10 MG Tab Take 1 Tablet by mouth every 6 hours as needed (for nausea, vomiting). 30 Tablet 6    ondansetron (ZOFRAN) 4 MG Tab tablet Take 1 Tablet by mouth every four hours as needed for Nausea/Vomiting (for nausea, vomiting). 30 Tablet 6    Pseudoephedrine HCl (SUDAFED 12 HOUR PO) Take 1 Tablet by mouth as needed (For allergies).       No current facility-administered medications on file prior to encounter.         Review of Systems   Constitutional:  Positive for malaise/fatigue and weight loss (appetite is good and he is eating well - down 2 pounds). Negative for chills, diaphoresis and fever.   Respiratory:  Negative for cough, shortness of breath and wheezing.    Cardiovascular:  Negative for chest pain and palpitations.   Gastrointestinal:  Negative for constipation, diarrhea, nausea and vomiting.   Genitourinary:  Negative for dysuria.   Musculoskeletal:  Negative for myalgias.   Skin:  Negative for itching and rash.  "  Neurological:  Positive for tingling. Negative for dizziness and headaches.   Psychiatric/Behavioral:  The patient has insomnia (hard as he works night shift).             Objective     /86   Pulse 79   Temp 36.4 °C (97.5 °F) (Temporal)   Resp 18   Ht 1.626 m (5' 4\")   Wt 76.7 kg (169 lb 1.5 oz)   SpO2 96%   BMI 29.02 kg/m²      Physical Exam  Vitals reviewed.   Constitutional:       General: He is not in acute distress.     Appearance: Normal appearance. He is not diaphoretic.   HENT:      Head: Normocephalic and atraumatic.   Cardiovascular:      Rate and Rhythm: Normal rate and regular rhythm.      Heart sounds: No murmur heard.    No friction rub. No gallop.   Pulmonary:      Effort: Pulmonary effort is normal. No respiratory distress.      Breath sounds: Normal breath sounds. No wheezing.   Abdominal:      General: Bowel sounds are normal. There is no distension.      Palpations: Abdomen is soft.      Tenderness: There is no abdominal tenderness.   Musculoskeletal:         General: No swelling or tenderness. Normal range of motion.   Skin:     General: Skin is warm and dry.   Neurological:      Mental Status: He is alert and oriented to person, place, and time.   Psychiatric:         Mood and Affect: Mood normal.         Behavior: Behavior normal.                      Assessment & Plan       1. Diffuse large B-cell lymphoma of lymph nodes of neck (HCC)        2. Encounter for long-term (current) use of high-risk medication                Patient with diffuse large B-cell lymphoma currently on R-CHOP.  Patient is scheduled to proceed with cycle 3 tomorrow.  Clinically he is very stable and tolerating treatment well.  If labs meet parameters he is okay to proceed with treatment as planned.    Per Dr. Kwan she is requesting a PET scan after the completion of 3 cycles.  This is currently scheduled, and follow-up visit with Dr. Kwan is scheduled prior to cycle 4.    Please note that this dictation " was created using voice recognition software. I have made every reasonable attempt to correct obvious errors, but I expect that there are errors of grammar and possibly content that I did not discover before finalizing the note.    Addendum:  Day before cycle 3 treatment patient was neutropenic with an ANC of 780.  Request for repeat CBC confirmed day of treatment his ANC was at 1300 and we proceeded with treatment as planned.  However, did discuss case with Dr. Mclaughlin as Dr. Kwan was unavailable and will plan on having jhonathandarian authorized for possibility of need for the duration of his treatments with R-CHOP.    Also confirmed from the infusion nurse today that patient was unaware that he needed to continue to take his prednisone days 1-5.  He did it completely for cycle 1, but patient did not realize he needed to do it for all subsequent cycles.  He received his prednisone dose on day 1 of cycle 2 but did not take it for days 2-5 at home.  Patient has been educated in detail by the infusion nurse that he is to continue to take the prednisone on days 1-5 and he confirmed understanding.

## 2022-12-08 ENCOUNTER — OUTPATIENT INFUSION SERVICES (OUTPATIENT)
Dept: ONCOLOGY | Facility: MEDICAL CENTER | Age: 52
End: 2022-12-08
Attending: STUDENT IN AN ORGANIZED HEALTH CARE EDUCATION/TRAINING PROGRAM
Payer: COMMERCIAL

## 2022-12-08 ENCOUNTER — TELEPHONE (OUTPATIENT)
Dept: HEMATOLOGY ONCOLOGY | Facility: MEDICAL CENTER | Age: 52
End: 2022-12-08
Payer: COMMERCIAL

## 2022-12-08 VITALS
OXYGEN SATURATION: 95 % | BODY MASS INDEX: 28.72 KG/M2 | RESPIRATION RATE: 16 BRPM | TEMPERATURE: 96.8 F | HEIGHT: 65 IN | WEIGHT: 172.4 LBS | SYSTOLIC BLOOD PRESSURE: 119 MMHG | DIASTOLIC BLOOD PRESSURE: 82 MMHG | HEART RATE: 85 BPM

## 2022-12-08 DIAGNOSIS — C83.31 DIFFUSE LARGE B-CELL LYMPHOMA OF LYMPH NODES OF NECK (HCC): ICD-10-CM

## 2022-12-08 LAB
BASOPHILS # BLD AUTO: 1.6 % (ref 0–1.8)
BASOPHILS # BLD: 0.06 K/UL (ref 0–0.12)
EOSINOPHIL # BLD AUTO: 0.06 K/UL (ref 0–0.51)
EOSINOPHIL NFR BLD: 1.6 % (ref 0–6.9)
ERYTHROCYTE [DISTWIDTH] IN BLOOD BY AUTOMATED COUNT: 46.1 FL (ref 35.9–50)
HCT VFR BLD AUTO: 38.3 % (ref 42–52)
HGB BLD-MCNC: 13.2 G/DL (ref 14–18)
IMM GRANULOCYTES # BLD AUTO: 0.16 K/UL (ref 0–0.11)
IMM GRANULOCYTES NFR BLD AUTO: 4.2 % (ref 0–0.9)
LYMPHOCYTES # BLD AUTO: 1.32 K/UL (ref 1–4.8)
LYMPHOCYTES NFR BLD: 35 % (ref 22–41)
MCH RBC QN AUTO: 33 PG (ref 27–33)
MCHC RBC AUTO-ENTMCNC: 34.5 G/DL (ref 33.7–35.3)
MCV RBC AUTO: 95.8 FL (ref 81.4–97.8)
MONOCYTES # BLD AUTO: 0.87 K/UL (ref 0–0.85)
MONOCYTES NFR BLD AUTO: 23.1 % (ref 0–13.4)
NEUTROPHILS # BLD AUTO: 1.3 K/UL (ref 1.82–7.42)
NEUTROPHILS NFR BLD: 34.5 % (ref 44–72)
NRBC # BLD AUTO: 0 K/UL
NRBC BLD-RTO: 0 /100 WBC
OUTPT INFUS CBC COMMENT OICOM: ABNORMAL
PLATELET # BLD AUTO: 362 K/UL (ref 164–446)
PMV BLD AUTO: 8.3 FL (ref 9–12.9)
RBC # BLD AUTO: 4 M/UL (ref 4.7–6.1)
WBC # BLD AUTO: 3.8 K/UL (ref 4.8–10.8)

## 2022-12-08 PROCEDURE — A9270 NON-COVERED ITEM OR SERVICE: HCPCS | Performed by: STUDENT IN AN ORGANIZED HEALTH CARE EDUCATION/TRAINING PROGRAM

## 2022-12-08 PROCEDURE — 700105 HCHG RX REV CODE 258: Performed by: STUDENT IN AN ORGANIZED HEALTH CARE EDUCATION/TRAINING PROGRAM

## 2022-12-08 PROCEDURE — 96415 CHEMO IV INFUSION ADDL HR: CPT

## 2022-12-08 PROCEDURE — 700102 HCHG RX REV CODE 250 W/ 637 OVERRIDE(OP): Performed by: STUDENT IN AN ORGANIZED HEALTH CARE EDUCATION/TRAINING PROGRAM

## 2022-12-08 PROCEDURE — 96375 TX/PRO/DX INJ NEW DRUG ADDON: CPT

## 2022-12-08 PROCEDURE — 85025 COMPLETE CBC W/AUTO DIFF WBC: CPT

## 2022-12-08 PROCEDURE — 96411 CHEMO IV PUSH ADDL DRUG: CPT

## 2022-12-08 PROCEDURE — 96413 CHEMO IV INFUSION 1 HR: CPT

## 2022-12-08 PROCEDURE — A4212 NON CORING NEEDLE OR STYLET: HCPCS

## 2022-12-08 PROCEDURE — 96367 TX/PROPH/DG ADDL SEQ IV INF: CPT

## 2022-12-08 PROCEDURE — 96417 CHEMO IV INFUS EACH ADDL SEQ: CPT

## 2022-12-08 PROCEDURE — 700111 HCHG RX REV CODE 636 W/ 250 OVERRIDE (IP): Performed by: STUDENT IN AN ORGANIZED HEALTH CARE EDUCATION/TRAINING PROGRAM

## 2022-12-08 PROCEDURE — 96361 HYDRATE IV INFUSION ADD-ON: CPT

## 2022-12-08 RX ORDER — HEPARIN SODIUM (PORCINE) LOCK FLUSH IV SOLN 100 UNIT/ML 100 UNIT/ML
500 SOLUTION INTRAVENOUS PRN
Status: DISCONTINUED | OUTPATIENT
Start: 2022-12-08 | End: 2022-12-08 | Stop reason: HOSPADM

## 2022-12-08 RX ORDER — PREDNISONE 50 MG/1
100 TABLET ORAL ONCE
Status: COMPLETED | OUTPATIENT
Start: 2022-12-08 | End: 2022-12-08

## 2022-12-08 RX ORDER — SODIUM CHLORIDE 9 MG/ML
500 INJECTION, SOLUTION INTRAVENOUS ONCE
Status: COMPLETED | OUTPATIENT
Start: 2022-12-08 | End: 2022-12-08

## 2022-12-08 RX ORDER — ACETAMINOPHEN 325 MG/1
650 TABLET ORAL ONCE
Status: COMPLETED | OUTPATIENT
Start: 2022-12-08 | End: 2022-12-08

## 2022-12-08 RX ADMIN — FOSAPREPITANT 150 MG: 150 INJECTION, POWDER, LYOPHILIZED, FOR SOLUTION INTRAVENOUS at 09:40

## 2022-12-08 RX ADMIN — HEPARIN 500 UNITS: 100 SYRINGE at 16:07

## 2022-12-08 RX ADMIN — ONDANSETRON 16 MG: 2 INJECTION INTRAMUSCULAR; INTRAVENOUS at 09:20

## 2022-12-08 RX ADMIN — ACETAMINOPHEN 650 MG: 325 TABLET ORAL at 09:02

## 2022-12-08 RX ADMIN — SODIUM CHLORIDE 500 ML: 9 INJECTION, SOLUTION INTRAVENOUS at 14:43

## 2022-12-08 RX ADMIN — SODIUM CHLORIDE 500 ML: 9 INJECTION, SOLUTION INTRAVENOUS at 08:51

## 2022-12-08 RX ADMIN — PREDNISONE 100 MG: 50 TABLET ORAL at 09:02

## 2022-12-08 RX ADMIN — DOXORUBICIN HYDROCHLORIDE 94.5 MG: 2 INJECTION, SOLUTION INTRAVENOUS at 14:46

## 2022-12-08 RX ADMIN — VINCRISTINE SULFATE 2 MG: 1 INJECTION, SOLUTION INTRAVENOUS at 15:30

## 2022-12-08 RX ADMIN — RITUXIMAB-ABBS: 10 INJECTION, SOLUTION INTRAVENOUS at 10:34

## 2022-12-08 RX ADMIN — DIPHENHYDRAMINE HYDROCHLORIDE 25 MG: 50 INJECTION, SOLUTION INTRAMUSCULAR; INTRAVENOUS at 09:03

## 2022-12-08 RX ADMIN — CYCLOPHOSPHAMIDE 1417.6 MG: 200 INJECTION, SOLUTION INTRAVENOUS at 13:35

## 2022-12-08 ASSESSMENT — FIBROSIS 4 INDEX: FIB4 SCORE: 0.73

## 2022-12-08 NOTE — PROGRESS NOTES
"Pharmacy chemotherapy verification:    Patient Name: Azeem Mejia    Dx: DLBCL     Protocol: R-CHOP   *Dosing Reference*  Rituximab (Rituxan) 375 mg/m2 IV on Day 1   Cyclophophamide (Cytoxan) 750 mg/m2 IV on Day 1   Doxorubicin (Adriamycin) 50 mg/m2 iv on Day 1   Vincristine 1.4 mg/m2 ( max 2 mg ) iv on Day 1   Prednisone 100 mg po qday on days 1-5 - pt to take at home as ordered  Repeat every 21 days x 4 to 6 cycles   NCCN Guidelines for B Cell Lymphomas V.4.2022  Coianne B, et al, Blood. 2010 Sep 23;116(12):2040-5.  Selinaky DO, et al -J Clin Oncol 2020 Sep 10;38(26):5816-1375.    Allergies:  Seasonal     /82   Pulse 85   Temp 36 °C (96.8 °F) (Temporal)   Resp 16   Ht 1.65 m (5' 4.96\")   Wt 78.2 kg (172 lb 6.4 oz)   SpO2 95%   BMI 28.72 kg/m²  Body surface area is 1.89 meters squared.     9/22/22 09:14   Hepatitis B Surface Antigen Non-Reactive   Hepatitis B Cors Ab,IgM Non-Reactive     9/30/22 ECHO LVEF ~ 55 %     Labs 12/8/22:  ANC~ 1300 Plt = 362k   Hgb = 13.2     Labs 12/7/22  SCr = 0.59 mg/dL CrCl >125 mL/min   LFT's = WNL TBili = 0.4     Drug Order   (Drug name, dose, route, IV Fluid & volume, frequency, number of doses) Cycle 3  Previous treatment: C2 on 11/17/22     Medication = Rituximab-abbs (Truxima)  Base Dose = 375 mg/m2  Calc Dose: Base Dose x 1.89 m2 = 708.75 mg  Final Dose = 700 mg  Route = IV  Fluid & Volume =  mL  Admin Duration = See rate sheet          Rounded to vial size (within 10%) per dose rounding protocol; ok to treat with final dose     Medication = Cyclophosphamide (Cytoxan)  Base Dose = 750 mg/m2  Calc Dose: Base Dose x 1.89 m2 = 1417.5 mg  Final Dose = 1417.6 mg  Route = IV  Fluid & Volume =  mL  Admin Duration = Over 30 minutes          <10% difference, okay to treat with final dose     Medication = Doxorubicin (Adriamycin)  Base Dose = 50 mg/m2  Calc Dose: Base Dose x 1.89 m2 = 94.5 mg  Final Dose = 94.5 mg  Route = IV  Fluid & Volume = 47.25 mL " (2 mg/mL)  Admin Duration = Over 20 minutes         <10% difference, okay to treat with final dose     Medication = Vincristine (VCR)   Base Dose = 1.4 mg/m2  Calc Dose:Base Dose x 1.89 m2 = 2.646 mg  Final Dose = 2 mg (MAX dose)  Route = IV  Fluid & Volume = NS 25 mL  Admin Duration = Over 2-10 minutes          <10% difference, okay to treat with final dose       By my signature below, I confirm this process was performed independently with the BSA and all final chemotherapy dosing calculations congruent. I have reviewed the above chemotherapy order and that my calculation of the final dose and BSA (when applicable) corroborate those calculations of the  pharmacist. Discrepancies of 10% or greater in the written dose have been addressed and documented within the EPIC Progress notes.    Filomena Brownlee, PharmD

## 2022-12-08 NOTE — PROGRESS NOTES
Chemotherapy Verification - PRIMARY RN      Height = 64.96 in  Weight = 172 lb  BSA = 1.89 m2       Medication: Rituximab  Dose: 375 mg/m2  Calculated Dose: 708.75 mg                             (In mg/m2, AUC, mg/kg)     Medication: Cytoxan  Dose: 750 mg/m2  Calculated Dose: 1,417.5 mg                             (In mg/m2, AUC, mg/kg)    Medication: Adriamycin  Dose: 50 mg/m2  Calculated Dose: 94.5 / LVEF = 55% on 9/30/2022                            (In mg/m2, AUC, mg/kg)    Medication: Vincristine  Dose: 1.4 mg/m2  Calculated Dose: 2.646 mg / Set dose 2 mg                             (In mg/m2, AUC, mg/kg)      I confirm this process was performed independently with the BSA and all final chemotherapy dosing calculations congruent.  Any discrepancies of 10% or greater have been addressed with the chemotherapy pharmacist. The resolution of the discrepancy has been documented in the EPIC progress notes.

## 2022-12-08 NOTE — PROGRESS NOTES
ig [facility] for Cycle 3 of R-Chop for Diffuse large B-cell lymphoma of lymph nodes of neck. Pt denied having any new or acute complaints today, reports tolerating past treatments well. Talked to Rashid POWELL, regarding low ANC order received to repeat CBC and to proceed if meet parameters. Port accessed in a sterile manner, had + blood return, flushed briskly. Pt given anticancer therapy as prescribed, tolerated well, denied having any complaints during or after infusion. Patient to continue prednisone at home on days 2-5. Port had + blood return after, flushed per [facility] policy, de-accessed, needle intact, insertion site covered with sterile gauze and paper tape. Discharge home to self care in [facility]. Appointment confirm for next treatment.

## 2022-12-08 NOTE — PROGRESS NOTES
Pt ambulated without assistance to [facility] for Pre-Chemo labs.    Labs collected via venipuncture. Gauze and coban applied post.    Pt verified he has appointment 12/8/22 for chemo if labs meet parameters.    Pt left without any assistance and in no apparent distress.

## 2022-12-08 NOTE — PROGRESS NOTES
Chemotherapy Verification - SECONDARY RN       Height = 165 cm  Weight = 78.2 kg  BSA = 1.89 m2       Medication: Ruxience  Dose: 375 mg/m2  Calculated Dose: 708.75 mg                             (In mg/m2, AUC, mg/kg)     Medication: Cytoxan  Dose: 750 mg/m2  Calculated Dose: 1,417.5 mg                             (In mg/m2, AUC, mg/kg)    Medication: Doxorubicin  Dose: 50 mg/m2  Calculated Dose: 94.5 mg                             (In mg/m2, AUC, mg/kg)    Medication: Vincristine  Dose: 2 mg (set dose)  Calculated Dose: 2 mg                             (In mg/m2, AUC, mg/kg)      I confirm that this process was performed independently.

## 2022-12-08 NOTE — PROGRESS NOTES
"Pharmacy Chemotherapy Calculations    Patient Name: Azeem Mejia  Dx: Diffuse Large B-Cell Lymphoma    Cycle 3  Previous treatment = C2 on 11/17/22    Regimen: R-CHOP  *Dosing Reference*  riTUXimab 375 mg/m² IV on Day 1  Cyclophosphamide 750 mg/m² IV over 30 minutes on Day 1   DOXOrubicin 50 mg/m² IV push on Day 1   vinCRIStine 1.4 mg/m² (max dose of 2 mg) IV over 5-10 minutes on Day 1  Predinsone 100 mg PO daily on Days 1-5   21 day cycle for 4-6 cycles   NCCN Guidelines for B-Cell Lymphomas V.4.2022.  Coiffier B, et al. Blood. 2010;116(12):2040-5.  Lashon DO, et al. J Clin Oncol. 2020;38(26):0020-5184.    Allergies:  Seasonal    /82   Pulse 85   Temp 36 °C (96.8 °F) (Temporal)   Resp 16   Ht 1.65 m (5' 4.96\")   Wt 78.2 kg (172 lb 6.4 oz)   SpO2 95%   BMI 28.72 kg/m²  Body surface area is 1.89 meters squared.     9/22/22 09:14   Hepatitis B Surface Antigen Non-Reactive   Hepatitis B Cors Ab,IgM Non-Reactive     9/30/22 ECHO LVEF ~55%    Labs from 12/07-08/2022 reviewed - all within treatment parameters      riTUXImab-abbs (Truxima) 375 mg/m² x 1.89 m² = 708.75 mg   Rounded to nearest vial size (within 10%) per rounding protocol  Okay to treat with final dose  = 700 mg IV    Cyclophosphamide 750 mg/m² x 1.89 m² = 1417.5 mg   <10% difference, okay to treat with final dose = 1417.6 mg IV    DOXOrubicin 50 mg/m² x 1.89 m² = 94.5 mg   <10% difference, okay to treat with final dose = 94.5 mg IV    vinCRIStine 1.4 mg/m² x 1.89 m² = 2.646 mg (Max dose 2 mg)   <10% difference, okay to treat with final dose = 2 mg IV    Bia Stockton, PharmD, BCOP    "

## 2022-12-20 RX ORDER — 0.9 % SODIUM CHLORIDE 0.9 %
VIAL (ML) INJECTION PRN
Status: CANCELLED | OUTPATIENT
Start: 2022-12-29

## 2022-12-20 RX ORDER — PROCHLORPERAZINE MALEATE 10 MG
10 TABLET ORAL EVERY 6 HOURS PRN
Status: CANCELLED | OUTPATIENT
Start: 2022-12-29

## 2022-12-20 RX ORDER — METHYLPREDNISOLONE SODIUM SUCCINATE 125 MG/2ML
125 INJECTION, POWDER, LYOPHILIZED, FOR SOLUTION INTRAMUSCULAR; INTRAVENOUS PRN
Status: CANCELLED | OUTPATIENT
Start: 2022-12-29

## 2022-12-20 RX ORDER — PREDNISONE 50 MG/1
100 TABLET ORAL ONCE
Status: CANCELLED | OUTPATIENT
Start: 2022-12-29 | End: 2022-12-29

## 2022-12-20 RX ORDER — EPINEPHRINE 1 MG/ML(1)
0.5 AMPUL (ML) INJECTION PRN
Status: CANCELLED | OUTPATIENT
Start: 2022-12-29

## 2022-12-20 RX ORDER — 0.9 % SODIUM CHLORIDE 0.9 %
10 VIAL (ML) INJECTION PRN
Status: CANCELLED | OUTPATIENT
Start: 2022-12-28

## 2022-12-20 RX ORDER — ACETAMINOPHEN 325 MG/1
650 TABLET ORAL ONCE
Status: CANCELLED | OUTPATIENT
Start: 2022-12-29 | End: 2022-12-29

## 2022-12-20 RX ORDER — 0.9 % SODIUM CHLORIDE 0.9 %
10 VIAL (ML) INJECTION PRN
Status: CANCELLED | OUTPATIENT
Start: 2022-12-29

## 2022-12-20 RX ORDER — 0.9 % SODIUM CHLORIDE 0.9 %
VIAL (ML) INJECTION PRN
Status: CANCELLED | OUTPATIENT
Start: 2022-12-28

## 2022-12-20 RX ORDER — SODIUM CHLORIDE 9 MG/ML
500 INJECTION, SOLUTION INTRAVENOUS ONCE
Status: CANCELLED | OUTPATIENT
Start: 2022-12-29 | End: 2022-12-29

## 2022-12-20 RX ORDER — ACETAMINOPHEN 325 MG/1
650 TABLET ORAL PRN
Status: CANCELLED | OUTPATIENT
Start: 2022-12-29 | End: 2022-12-31

## 2022-12-20 RX ORDER — DIPHENHYDRAMINE HYDROCHLORIDE 50 MG/ML
25 INJECTION INTRAMUSCULAR; INTRAVENOUS PRN
Status: CANCELLED | OUTPATIENT
Start: 2022-12-29 | End: 2022-12-31

## 2022-12-20 RX ORDER — 0.9 % SODIUM CHLORIDE 0.9 %
3 VIAL (ML) INJECTION PRN
Status: CANCELLED | OUTPATIENT
Start: 2022-12-29

## 2022-12-20 RX ORDER — SODIUM CHLORIDE 9 MG/ML
INJECTION, SOLUTION INTRAVENOUS CONTINUOUS
Status: CANCELLED | OUTPATIENT
Start: 2022-12-29

## 2022-12-20 RX ORDER — ONDANSETRON 8 MG/1
8 TABLET, ORALLY DISINTEGRATING ORAL PRN
Status: CANCELLED | OUTPATIENT
Start: 2022-12-29

## 2022-12-20 RX ORDER — DIPHENHYDRAMINE HYDROCHLORIDE 50 MG/ML
50 INJECTION INTRAMUSCULAR; INTRAVENOUS PRN
Status: CANCELLED | OUTPATIENT
Start: 2022-12-29

## 2022-12-20 RX ORDER — 0.9 % SODIUM CHLORIDE 0.9 %
3 VIAL (ML) INJECTION PRN
Status: CANCELLED | OUTPATIENT
Start: 2022-12-28

## 2022-12-20 RX ORDER — ONDANSETRON 2 MG/ML
4 INJECTION INTRAMUSCULAR; INTRAVENOUS PRN
Status: CANCELLED | OUTPATIENT
Start: 2022-12-29

## 2022-12-20 RX ORDER — HEPARIN SODIUM (PORCINE) LOCK FLUSH IV SOLN 100 UNIT/ML 100 UNIT/ML
500 SOLUTION INTRAVENOUS PRN
Status: CANCELLED | OUTPATIENT
Start: 2022-12-29

## 2022-12-20 RX ORDER — HEPARIN SODIUM (PORCINE) LOCK FLUSH IV SOLN 100 UNIT/ML 100 UNIT/ML
500 SOLUTION INTRAVENOUS PRN
Status: CANCELLED | OUTPATIENT
Start: 2022-12-28

## 2022-12-20 RX ORDER — SODIUM CHLORIDE 9 MG/ML
500 INJECTION, SOLUTION INTRAVENOUS ONCE
Status: CANCELLED | OUTPATIENT
Start: 2022-12-29

## 2022-12-20 RX ORDER — MEPERIDINE HYDROCHLORIDE 25 MG/ML
25 INJECTION INTRAMUSCULAR; INTRAVENOUS; SUBCUTANEOUS PRN
Status: CANCELLED | OUTPATIENT
Start: 2022-12-29 | End: 2022-12-31

## 2022-12-27 ENCOUNTER — HOSPITAL ENCOUNTER (OUTPATIENT)
Dept: HEMATOLOGY ONCOLOGY | Facility: MEDICAL CENTER | Age: 52
End: 2022-12-27
Attending: STUDENT IN AN ORGANIZED HEALTH CARE EDUCATION/TRAINING PROGRAM
Payer: COMMERCIAL

## 2022-12-27 ENCOUNTER — OUTPATIENT INFUSION SERVICES (OUTPATIENT)
Dept: ONCOLOGY | Facility: MEDICAL CENTER | Age: 52
End: 2022-12-27
Attending: STUDENT IN AN ORGANIZED HEALTH CARE EDUCATION/TRAINING PROGRAM
Payer: COMMERCIAL

## 2022-12-27 ENCOUNTER — HOSPITAL ENCOUNTER (OUTPATIENT)
Dept: RADIOLOGY | Facility: MEDICAL CENTER | Age: 52
End: 2022-12-27
Attending: NURSE PRACTITIONER
Payer: COMMERCIAL

## 2022-12-27 VITALS
SYSTOLIC BLOOD PRESSURE: 106 MMHG | DIASTOLIC BLOOD PRESSURE: 64 MMHG | HEART RATE: 88 BPM | RESPIRATION RATE: 16 BRPM | TEMPERATURE: 98.5 F | WEIGHT: 167.22 LBS | HEIGHT: 65 IN | BODY MASS INDEX: 27.86 KG/M2 | OXYGEN SATURATION: 95 %

## 2022-12-27 VITALS
TEMPERATURE: 97 F | RESPIRATION RATE: 18 BRPM | OXYGEN SATURATION: 98 % | DIASTOLIC BLOOD PRESSURE: 71 MMHG | HEART RATE: 84 BPM | SYSTOLIC BLOOD PRESSURE: 109 MMHG

## 2022-12-27 DIAGNOSIS — C83.31 DIFFUSE LARGE B-CELL LYMPHOMA OF LYMPH NODES OF NECK (HCC): ICD-10-CM

## 2022-12-27 LAB
ALBUMIN SERPL BCP-MCNC: 4.1 G/DL (ref 3.2–4.9)
ALBUMIN/GLOB SERPL: 1.3 G/DL
ALP SERPL-CCNC: 77 U/L (ref 30–99)
ALT SERPL-CCNC: 42 U/L (ref 2–50)
ANION GAP SERPL CALC-SCNC: 12 MMOL/L (ref 7–16)
AST SERPL-CCNC: 30 U/L (ref 12–45)
BASOPHILS # BLD AUTO: 1.6 % (ref 0–1.8)
BASOPHILS # BLD: 0.05 K/UL (ref 0–0.12)
BILIRUB SERPL-MCNC: 0.3 MG/DL (ref 0.1–1.5)
BUN SERPL-MCNC: 14 MG/DL (ref 8–22)
CALCIUM ALBUM COR SERPL-MCNC: 9 MG/DL (ref 8.5–10.5)
CALCIUM SERPL-MCNC: 9.1 MG/DL (ref 8.5–10.5)
CHLORIDE SERPL-SCNC: 100 MMOL/L (ref 96–112)
CO2 SERPL-SCNC: 22 MMOL/L (ref 20–33)
CREAT SERPL-MCNC: 0.73 MG/DL (ref 0.5–1.4)
EOSINOPHIL # BLD AUTO: 0.08 K/UL (ref 0–0.51)
EOSINOPHIL NFR BLD: 2.6 % (ref 0–6.9)
ERYTHROCYTE [DISTWIDTH] IN BLOOD BY AUTOMATED COUNT: 50 FL (ref 35.9–50)
GFR SERPLBLD CREATININE-BSD FMLA CKD-EPI: 109 ML/MIN/1.73 M 2
GLOBULIN SER CALC-MCNC: 3.1 G/DL (ref 1.9–3.5)
GLUCOSE SERPL-MCNC: 132 MG/DL (ref 65–99)
HCT VFR BLD AUTO: 40.5 % (ref 42–52)
HGB BLD-MCNC: 14 G/DL (ref 14–18)
IMM GRANULOCYTES # BLD AUTO: 0.04 K/UL (ref 0–0.11)
IMM GRANULOCYTES NFR BLD AUTO: 1.3 % (ref 0–0.9)
LYMPHOCYTES # BLD AUTO: 0.91 K/UL (ref 1–4.8)
LYMPHOCYTES NFR BLD: 29.9 % (ref 22–41)
MCH RBC QN AUTO: 34 PG (ref 27–33)
MCHC RBC AUTO-ENTMCNC: 34.6 G/DL (ref 33.7–35.3)
MCV RBC AUTO: 98.3 FL (ref 81.4–97.8)
MONOCYTES # BLD AUTO: 0.82 K/UL (ref 0–0.85)
MONOCYTES NFR BLD AUTO: 27 % (ref 0–13.4)
NEUTROPHILS # BLD AUTO: 1.14 K/UL (ref 1.82–7.42)
NEUTROPHILS NFR BLD: 37.6 % (ref 44–72)
NRBC # BLD AUTO: 0.02 K/UL
NRBC BLD-RTO: 0.7 /100 WBC
OUTPT INFUS CBC COMMENT OICOM: ABNORMAL
PLATELET # BLD AUTO: 351 K/UL (ref 164–446)
PMV BLD AUTO: 8.6 FL (ref 9–12.9)
POTASSIUM SERPL-SCNC: 3.7 MMOL/L (ref 3.6–5.5)
PROT SERPL-MCNC: 7.2 G/DL (ref 6–8.2)
RBC # BLD AUTO: 4.12 M/UL (ref 4.7–6.1)
SODIUM SERPL-SCNC: 134 MMOL/L (ref 135–145)
WBC # BLD AUTO: 3 K/UL (ref 4.8–10.8)

## 2022-12-27 PROCEDURE — 36415 COLL VENOUS BLD VENIPUNCTURE: CPT

## 2022-12-27 PROCEDURE — 80053 COMPREHEN METABOLIC PANEL: CPT

## 2022-12-27 PROCEDURE — 99214 OFFICE O/P EST MOD 30 MIN: CPT | Performed by: STUDENT IN AN ORGANIZED HEALTH CARE EDUCATION/TRAINING PROGRAM

## 2022-12-27 PROCEDURE — A9552 F18 FDG: HCPCS

## 2022-12-27 PROCEDURE — 85025 COMPLETE CBC W/AUTO DIFF WBC: CPT

## 2022-12-27 PROCEDURE — 99212 OFFICE O/P EST SF 10 MIN: CPT | Performed by: STUDENT IN AN ORGANIZED HEALTH CARE EDUCATION/TRAINING PROGRAM

## 2022-12-27 ASSESSMENT — ENCOUNTER SYMPTOMS
NAUSEA: 0
NECK PAIN: 0
HEADACHES: 0
WEIGHT LOSS: 0
DEPRESSION: 0
DIZZINESS: 0
FEVER: 0
BRUISES/BLEEDS EASILY: 0
TINGLING: 0
SENSORY CHANGE: 0
VOMITING: 0
WHEEZING: 0
SHORTNESS OF BREATH: 0
COUGH: 0
HEARTBURN: 0
BLURRED VISION: 0
PALPITATIONS: 0
TREMORS: 0
SPUTUM PRODUCTION: 0
CHILLS: 0
SORE THROAT: 0
FOCAL WEAKNESS: 0
MEMORY LOSS: 0
ABDOMINAL PAIN: 0
ORTHOPNEA: 0

## 2022-12-27 ASSESSMENT — PAIN SCALES - GENERAL: PAINLEVEL: NO PAIN

## 2022-12-27 ASSESSMENT — FIBROSIS 4 INDEX: FIB4 SCORE: 0.81

## 2022-12-27 NOTE — PROGRESS NOTES
"Pharmacy chemotherapy verification:    Patient Name: Azeem Mejia    Dx: DLBCL     Protocol: R-CHOP   *Dosing Reference*  Rituximab (Rituxan) 375 mg/m2 IV on Day 1   Cyclophophamide (Cytoxan) 750 mg/m2 IV on Day 1   Doxorubicin (Adriamycin) 50 mg/m2 iv on Day 1   Vincristine 1.4 mg/m2 ( max 2 mg ) iv on Day 1   Prednisone 100 mg po qday on days 1-5 - pt to take at home as ordered  Repeat every 21 days x 4 to 6 cycles   NCCN Guidelines for B Cell Lymphomas V.4.2022  Coianne B, et al, Blood. 2010 Sep 23;116(12):2040-5.  Selinaky DO, et al -J Clin Oncol 2020 Sep 10;38(26):2167-1522.    Allergies:  Seasonal     /84   Pulse 90   Temp 36 °C (96.8 °F) (Temporal)   Resp 18   Ht 1.65 m (5' 4.96\")   Wt 77.8 kg (171 lb 8.3 oz)   SpO2 98%   BMI 28.58 kg/m²  Body surface area is 1.89 meters squared.     9/22/22 09:14   Hepatitis B Surface Antigen Non-Reactive   Hepatitis B Cors Ab,IgM Non-Reactive     9/30/22 ECHO LVEF ~ 55 %     Labs 12/27/22:  ANC~ 1140 Plt = 351k   Hgb = 14.0     SCr = 0.73 mg/dL CrCl >125 mL/min   AST/ALT/AP = 30/42/77 TBili = 0.3     Drug Order   (Drug name, dose, route, IV Fluid & volume, frequency, number of doses) Cycle 4  Previous treatment: C3 on 12/8/22     Medication = Rituximab-abbs (Truxima)  Base Dose = 375 mg/m2  Calc Dose: Base Dose x 1.89 m2 = 708.75 mg  Final Dose = 700 mg  Route = IV  Fluid & Volume =  mL  Admin Duration = See rate sheet          Rounded to vial size (within 10%) per dose rounding protocol; ok to treat with final dose     Medication = Cyclophosphamide (Cytoxan)  Base Dose = 750 mg/m2  Calc Dose: Base Dose x 1.89 m2 = 1417.5 mg  Final Dose = 1417.6 mg  Route = IV  Fluid & Volume =  mL  Admin Duration = Over 30 minutes          <10% difference, okay to treat with final dose     Medication = Doxorubicin (Adriamycin)  Base Dose = 50 mg/m2  Calc Dose: Base Dose x 1.89 m2 = 94.5 mg  Final Dose = 94.5 mg  Route = IV  Fluid & Volume = 47.25 mL (2 " mg/mL)  Admin Duration = Over 20 minutes         <10% difference, okay to treat with final dose     Medication = Vincristine (VCR)   Base Dose = 1.4 mg/m2  Calc Dose:Base Dose x 1.89 m2 = 2.646 mg  Final Dose = 2 mg (MAX dose)  Route = IV  Fluid & Volume = NS 25 mL  Admin Duration = Over 2-10 minutes          <10% difference, okay to treat with final dose       By my signature below, I confirm this process was performed independently with the BSA and all final chemotherapy dosing calculations congruent. I have reviewed the above chemotherapy order and that my calculation of the final dose and BSA (when applicable) corroborate those calculations of the  pharmacist. Discrepancies of 10% or greater in the written dose have been addressed and documented within the EPIC Progress notes.    Mahad Montesinos, PharmD

## 2022-12-27 NOTE — PROGRESS NOTES
Consult Note: Hematology/Oncology     Referring Provider: Peggy Kinsey  Primary Care:  Carlos Mcnamara M.D.    Chief Complaint   Patient presents with    Cancer     Prechemo        Current Treatment:   10/27/22: C1D1 RCHOP  11/17/22: C2D1 RCHOP  12/08/22: C3D1 RCHOP  12/29/22: C4D1 RCHOP    Prior Treatment: None    Subjective:   History of Presenting Illness:  Azeem Mejia is a 52 y.o. male who presents today with a new diagnosis of diffuse large B-cell lymphoma.    Patient was originally seen by his PCP on August 17, 2022 with a submandibular lymphadenopathy.  He reported a 2-week history of lymphadenopathy.  Blood work at that time showed no abnormalities.  Patient underwent an ultrasound the same day which showed a 3.1 x 1.7 x 2.1 cm lymph node on the left submandibular region.  He was referred to Peggy on August 30, 2022 for submandibular lymphadenopathy work-up.    Patient underwent a CT with contrast which showed a 2.2 x 2.5 x 3.4 cm mass.  And he underwent a biopsy on September 7 with initial pathology showing a B-cell lymphoproliferative disorder with dim CD10 expression.  FISH analysis was negative for CCN D1 IGH, Bcl-2, BCL6, MYC.  Final path showed diffuse large B-cell lymphoma with germinal center B type.  C-Myc was negative.  Patient is not a double hit expresser.    He works in [employer]  He was a major in literature. 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     Interval History:    Patient continues to do very well with treatment.  He is tolerating it with minimal side effects.      He has noticed in the last few days some neuropathy in his fingertips and toes and heels.  This is approximately grade 1 as it does come and go.      He still working full-time night shift and does have fatigue associated with the days that he works.       He did have constipation with his  first cycle but he has increased fiber in his diet and his constipation has resolved.      He is noticing darkness of his nails.      Patient denies any nausea or vomiting.     He had his PET scan done today    Past Medical History:   Diagnosis Date    Asthma     Cancer (HCC) 10/21/2022    left submandibular lymphoma large B cell    Dental disorder 10/21/2022    upper partial, lower left front times 1 cap    Diffuse large B-cell lymphoma of lymph nodes of neck (HCC)     Hyperlipidemia         Past Surgical History:   Procedure Laterality Date    MA DX BONE MARROW ASPIRATIONS Left 9/27/2022    Procedure: BONE MARROW CORE AND ASPIRATION;  Surgeon: Subhash Mathews M.D.;  Location: SURGERY SAME DAY [facility];  Service: Orthopedics    MA DX BONE MARROW BIOPSIES Left 9/27/2022    Procedure: BIOPSY, BONE MARROW, USING NEEDLE OR TROCAR - FLEMING;  Surgeon: Subhash Mathews M.D.;  Location: SURGERY SAME DAY [facility];  Service: Orthopedics    NASAL POLYPECTOMY Right        Social History     Tobacco Use    Smoking status: Never    Smokeless tobacco: Never   Vaping Use    Vaping Use: Never used   Substance Use Topics    Alcohol use: Yes     Comment: very rare    Drug use: Not Currently        Family History   Problem Relation Age of Onset    Diabetes Father     Asthma Sister     Hyperlipidemia Neg Hx     Hypertension Neg Hx     Cancer Neg Hx        Allergies   Allergen Reactions    Seasonal Runny Nose and Itching     Eyes and nose       Current Outpatient Medications   Medication Sig Dispense Refill    allopurinol (ZYLOPRIM) 300 MG Tab Take 1 Tablet by mouth every day. 30 Tablet 5    lidocaine-prilocaine (EMLA) 2.5-2.5 % Cream Apply to port 1 hour prior to access of port and cover with plastic wrap. 30 g 3    predniSONE (DELTASONE) 50 MG Tab Take 1 Tablet by mouth every day. Take 2 tablets (100mg) on days 1-5 of chemotherapy      acetaminophen (TYLENOL) 500 MG Tab Take 500 mg by mouth every 6 hours as needed. Indications: Pain       "prochlorperazine (COMPAZINE) 10 MG Tab Take 1 Tablet by mouth every 6 hours as needed (for nausea, vomiting). 30 Tablet 6    ondansetron (ZOFRAN) 4 MG Tab tablet Take 1 Tablet by mouth every four hours as needed for Nausea/Vomiting (for nausea, vomiting). 30 Tablet 6     No current facility-administered medications for this encounter.       Review of Systems   Constitutional:  Positive for malaise/fatigue. Negative for chills, fever and weight loss.   HENT:  Negative for congestion, ear pain, nosebleeds and sore throat.    Eyes:  Negative for blurred vision.   Respiratory:  Negative for cough, sputum production, shortness of breath and wheezing.    Cardiovascular:  Negative for chest pain, palpitations, orthopnea and leg swelling.   Gastrointestinal:  Negative for abdominal pain, heartburn, nausea and vomiting.   Genitourinary:  Negative for dysuria, frequency and urgency.   Musculoskeletal:  Negative for neck pain.   Neurological:  Negative for dizziness, tingling, tremors, sensory change, focal weakness and headaches.   Endo/Heme/Allergies:  Does not bruise/bleed easily.   Psychiatric/Behavioral:  Negative for depression, memory loss and suicidal ideas.    All other systems reviewed and are negative.    Problem list, medications, and allergies reviewed by myself today in Epic.     Objective:     Vitals:    12/27/22 1349   BP: 106/64   BP Location: Right arm   Patient Position: Sitting   BP Cuff Size: Adult   Pulse: 88   Resp: 16   Temp: 36.9 °C (98.5 °F)   TempSrc: Temporal   SpO2: 95%   Weight: 75.8 kg (167 lb 3.5 oz)   Height: 1.65 m (5' 4.96\")       DESC; KARNOFSKY SCALE WITH ECOG EQUIVALENT: 90, Able to carry on normal activity; minor signs or symptoms of disease (ECOG equivalent 0)    DISTRESS LEVEL: no apparent distress    Physical Exam  Constitutional:       General: He is not in acute distress.     Appearance: Normal appearance.      Comments: Port in place, no sign of infection   HENT:      Head: " Normocephalic and atraumatic.      Nose: Nose normal. No congestion.      Mouth/Throat:      Mouth: Mucous membranes are moist.      Pharynx: Oropharynx is clear.   Eyes:      General: No scleral icterus.     Conjunctiva/sclera: Conjunctivae normal.      Pupils: Pupils are equal, round, and reactive to light.   Cardiovascular:      Rate and Rhythm: Normal rate and regular rhythm.      Pulses: Normal pulses.      Heart sounds: No murmur heard.    No friction rub.   Pulmonary:      Effort: Pulmonary effort is normal. No respiratory distress.      Breath sounds: Normal breath sounds. No stridor. No wheezing or rales.   Chest:      Chest wall: No tenderness.   Abdominal:      General: Abdomen is flat. Bowel sounds are normal. There is no distension.      Palpations: Abdomen is soft. There is no mass.      Tenderness: There is no abdominal tenderness. There is no guarding or rebound.   Musculoskeletal:         General: No swelling, tenderness or deformity. Normal range of motion.      Cervical back: Normal range of motion and neck supple. No rigidity or tenderness.      Right lower leg: No edema.      Left lower leg: No edema.   Skin:     General: Skin is warm.      Coloration: Skin is not jaundiced or pale.      Findings: No bruising or rash.   Neurological:      General: No focal deficit present.      Mental Status: He is alert and oriented to person, place, and time. Mental status is at baseline.      Motor: No weakness.   Psychiatric:         Mood and Affect: Mood normal.         Behavior: Behavior normal.         Thought Content: Thought content normal.         Judgment: Judgment normal.       Labs:   Most recent labs reviewed.  CBC and CMP  grossly normal.  HIV negative hepatitis B negative LDH normal uric acid normal     Imaging:   Most recent images below have been independently reviewed by me.      ECHO  Normal left ventricular size, thickness, and systolic function.  The left ventricular ejection fraction is  visually estimated to be 55%.  Mild hypokinesis basal posterior wall otherwise normal regional wall   motion.  Normal right ventricular size and systolic function.  No prior study is available for comparison    PET SCAN    1. Interval decrease in size of the left submandibular lymph node mass, measuring 1.3 x 1.9 cm, previously 2.5 x 3.5 cm. The mass demonstrates mild increased uptake (SUV max 5.6 )     2. Additional uptake in the nonenlarged left level 2/3 lymph nodes (SUV max 6.7) and right level 2 lymph nodes (SUV max 5.3) could relate to lymphoma as well      PET scan 12/27/22     1. Complete response to therapy with resolution of prior left submandibular lymph node mass. Deauville score is 1.     2. No increased uptake in the bilateral neck. No enlarged lymph nodes in the neck.     Pathology:  FINAL DIAGNOSIS:     A. Left submandibular node core biopsy:          Diffuse large B cell lymphoma, GCB type.          C-MYC IHC is negative which shows this is not a double           expressor.     Synoptic Summary for Non-Hodgkin Lymphoma/Lymphoid Neoplasms:          -Specimen: Lymph node        -Procedure: Core biopsy        -Tumor site: Left submandibular        -Histologic type: Diffuse large B cell lymphoma, GCB        -Immunophenotyping:            Flow cytometry: Performed; please refer to microscopic             description for details.            Immunohistochemistry: Performed; please refer to microscopic             description for details.        -Cytogenetic Studies: Not performed.        -Molecular Genetic Studies: FISH is negative for Bcl-2, Bcl-6,         C-MYC and t(11;14)- negative for double/triple hit.            Comment: One area of the core shows lower grade cells that have           a lower Ki-67 of   10% and may either represent either only a           partially involved node or an area of lower grade lymphoma such           as follicular lymphoma.  If this distinction is clinically            important than an excision of the whole node is necessary for           evaluation.  This case has been reviewed by a second           pathologist, Dr. Villanueva, who concurs with the diagnosis.       Bone Marrow Biopsy  FINAL DIAGNOSIS:     Peripheral blood smear and CBC:          Normal white blood cell count demonstrating a mild relative           monocytosis.   Bone marrow aspirate, clot, and core biopsy:          -Normal cellular bone marrow demonstrating:          -There is no morphologic, immunohistochemical or flow cytometric           evidence for bone marrow involvement by lymphoma.          -Trilineage hematopoiesis with maturation.          -No morphologic increase in blast cells.          -Focal dysmegakaryocytopoiesis (<10%).          -Mild increased polyclonal plasma cells.          -Adequate stainable bone marrow iron stores.          See comment.         Assessment/Plan:      Cancer Staging   Diffuse large B-cell lymphoma of lymph nodes of neck (HCC)  Staging form: Hodgkin and Non-Hodgkin Lymphoma, AJCC 8th Edition  - Clinical stage from 10/4/2022: Stage IIE (Diffuse large B-cell lymphoma) - Signed by Henna Kwan M.D. on 10/4/2022         Mr. Mejia is a 51 yo M who presents today for toxicity check for RCHOP after Cycle 3 for Stage 2 DLBCL.     Today I reviewed his PET scan results with the patient.  His PET scan results show complete response to therapy with resolution of prior left submandibular left node masses.  There is no increased uptake in the bilateral neck and no enlarged lymph nodes in the neck.  Given this complete response, we will proceed with 1 additional cycle of R-CHOP for a total of 4 cycles.     Surveillance will consist of   -HP and labs every 3-6 months for 5 years and then yearly or as clinically indicated  Managing will consist of a CT chest abdomen pelvis with contrast no more often than every 6 months for 2 years    Regimen  21-day cycles for 3 cycles followed by PET  scan  Cyclophosphamide 750 mg per metered squared IV 30 minutes on day 1 (patient should receive a combination of oral and IV hydration roughly 3 L on day of chemo)  Doxorubicin 50 mg per metered squared IV push on day 1  Vincristine 1.4 mg/m² with a maximum of 2 mg IV over 10 minutes on day 1  Prednisone 100 mg p.o. daily's on days 1 through 5  Rituximab 375 mg/m² on day 1    Reference  1.  NCCN guidelines for B-cell lymphoma V.4.2022  2. Coiffier B et al. Blood 2010:116(12):2040-5  3. Lashon MARCOS et al. J Clin Onc 2020:38(26):0280-0462      Any questions and concerns raised by the patient were addressed and answered. Patient denies any further questions.  Patient encouraged to call the office with any concerns or issues.     Henna Kwan M.D.  Hematology/Oncology    34 minutes spent on this case

## 2022-12-28 NOTE — PROGRESS NOTES
Patient arrived for pre-chemo lab draw. Pt reports fatigue but denies any further worsening symptoms. Labs drawn from L AC with 25G butterfly. Pressure dressing applied.  Confirmed appt; discharged home in good spirits.

## 2022-12-29 ENCOUNTER — OUTPATIENT INFUSION SERVICES (OUTPATIENT)
Dept: ONCOLOGY | Facility: MEDICAL CENTER | Age: 52
End: 2022-12-29
Attending: STUDENT IN AN ORGANIZED HEALTH CARE EDUCATION/TRAINING PROGRAM
Payer: COMMERCIAL

## 2022-12-29 VITALS
HEIGHT: 65 IN | WEIGHT: 171.52 LBS | OXYGEN SATURATION: 98 % | BODY MASS INDEX: 28.58 KG/M2 | HEART RATE: 90 BPM | TEMPERATURE: 96.8 F | SYSTOLIC BLOOD PRESSURE: 127 MMHG | DIASTOLIC BLOOD PRESSURE: 84 MMHG | RESPIRATION RATE: 18 BRPM

## 2022-12-29 DIAGNOSIS — C83.31 DIFFUSE LARGE B-CELL LYMPHOMA OF LYMPH NODES OF NECK (HCC): ICD-10-CM

## 2022-12-29 PROCEDURE — 96415 CHEMO IV INFUSION ADDL HR: CPT

## 2022-12-29 PROCEDURE — 700102 HCHG RX REV CODE 250 W/ 637 OVERRIDE(OP): Performed by: NURSE PRACTITIONER

## 2022-12-29 PROCEDURE — 700111 HCHG RX REV CODE 636 W/ 250 OVERRIDE (IP): Performed by: NURSE PRACTITIONER

## 2022-12-29 PROCEDURE — A4212 NON CORING NEEDLE OR STYLET: HCPCS

## 2022-12-29 PROCEDURE — 96367 TX/PROPH/DG ADDL SEQ IV INF: CPT

## 2022-12-29 PROCEDURE — 96411 CHEMO IV PUSH ADDL DRUG: CPT

## 2022-12-29 PROCEDURE — 96375 TX/PRO/DX INJ NEW DRUG ADDON: CPT

## 2022-12-29 PROCEDURE — 700105 HCHG RX REV CODE 258: Performed by: NURSE PRACTITIONER

## 2022-12-29 PROCEDURE — 96417 CHEMO IV INFUS EACH ADDL SEQ: CPT

## 2022-12-29 PROCEDURE — A9270 NON-COVERED ITEM OR SERVICE: HCPCS | Performed by: NURSE PRACTITIONER

## 2022-12-29 PROCEDURE — 96361 HYDRATE IV INFUSION ADD-ON: CPT

## 2022-12-29 PROCEDURE — 700111 HCHG RX REV CODE 636 W/ 250 OVERRIDE (IP)

## 2022-12-29 PROCEDURE — 96413 CHEMO IV INFUSION 1 HR: CPT

## 2022-12-29 RX ORDER — ACETAMINOPHEN 325 MG/1
650 TABLET ORAL ONCE
Status: COMPLETED | OUTPATIENT
Start: 2022-12-29 | End: 2022-12-29

## 2022-12-29 RX ORDER — PREDNISONE 50 MG/1
100 TABLET ORAL ONCE
Status: COMPLETED | OUTPATIENT
Start: 2022-12-29 | End: 2022-12-29

## 2022-12-29 RX ORDER — LIDOCAINE HYDROCHLORIDE 10 MG/ML
INJECTION, SOLUTION EPIDURAL; INFILTRATION; INTRACAUDAL; PERINEURAL
Status: COMPLETED
Start: 2022-12-29 | End: 2022-12-29

## 2022-12-29 RX ORDER — HEPARIN SODIUM (PORCINE) LOCK FLUSH IV SOLN 100 UNIT/ML 100 UNIT/ML
SOLUTION INTRAVENOUS
Status: COMPLETED
Start: 2022-12-29 | End: 2022-12-29

## 2022-12-29 RX ORDER — SODIUM CHLORIDE 9 MG/ML
500 INJECTION, SOLUTION INTRAVENOUS ONCE
Status: COMPLETED | OUTPATIENT
Start: 2022-12-29 | End: 2022-12-29

## 2022-12-29 RX ADMIN — FOSAPREPITANT 150 MG: 150 INJECTION, POWDER, LYOPHILIZED, FOR SOLUTION INTRAVENOUS at 09:19

## 2022-12-29 RX ADMIN — DIPHENHYDRAMINE HYDROCHLORIDE 25 MG: 50 INJECTION INTRAMUSCULAR; INTRAVENOUS at 08:54

## 2022-12-29 RX ADMIN — ONDANSETRON 16 MG: 2 INJECTION INTRAMUSCULAR; INTRAVENOUS at 08:35

## 2022-12-29 RX ADMIN — VINCRISTINE SULFATE 2 MG: 1 INJECTION, SOLUTION INTRAVENOUS at 14:49

## 2022-12-29 RX ADMIN — CYCLOPHOSPHAMIDE 1417.6 MG: 200 INJECTION, SOLUTION INTRAVENOUS at 13:06

## 2022-12-29 RX ADMIN — DOXORUBICIN HYDROCHLORIDE 94.5 MG: 2 INJECTION, SOLUTION INTRAVENOUS at 14:18

## 2022-12-29 RX ADMIN — SODIUM CHLORIDE 500 ML: 9 INJECTION, SOLUTION INTRAVENOUS at 08:06

## 2022-12-29 RX ADMIN — LIDOCAINE HYDROCHLORIDE 0.5 ML: 10 INJECTION, SOLUTION EPIDURAL; INFILTRATION; INTRACAUDAL; PERINEURAL at 08:06

## 2022-12-29 RX ADMIN — RITUXIMAB-ABBS 700 MG: 10 INJECTION, SOLUTION INTRAVENOUS at 10:17

## 2022-12-29 RX ADMIN — Medication 0.5 ML: at 08:06

## 2022-12-29 RX ADMIN — ACETAMINOPHEN 650 MG: 325 TABLET ORAL at 08:31

## 2022-12-29 RX ADMIN — PREDNISONE 100 MG: 50 TABLET ORAL at 08:31

## 2022-12-29 RX ADMIN — SODIUM CHLORIDE 500 ML: 9 INJECTION, SOLUTION INTRAVENOUS at 15:27

## 2022-12-29 RX ADMIN — HEPARIN 500 UNITS: 100 SYRINGE at 16:20

## 2022-12-29 ASSESSMENT — FIBROSIS 4 INDEX: FIB4 SCORE: 0.7

## 2022-12-29 NOTE — PROGRESS NOTES
V/S ENTERED AT THIS TIME.

## 2022-12-29 NOTE — PROGRESS NOTES
Pt arrived ambulatory to Infusion for D1C4 RCHOP. POC discussed and pt verbalized understanding. Pt denies s/s of illness or infection today, states he feels well other than fatigue. PORT accessed per protocol, lidocaine instilled prior to access, and pt tolerated well. Brisk blood return noted, line flushes with ease. Pre-hydration, pre-medications,Rituxan, and chemotherapy administered per MAR. Rituxan titrated per subsequent rate sheet. Post-hydration administered and pt tolerated treatment well today, sleeping for most of his regimen. No s/s of reactions or complications noted. PORT flushed per protocol (see MAR) and de-accessed with garcia needle intact. Site covered in sterile gauze/paper tape and pt tolerated well. Pt confirmed tomorrow's Udenyca appt, and pt discharged home ambulatory in [facility].

## 2022-12-29 NOTE — PROGRESS NOTES
Chemotherapy Verification - PRIMARY RN      Height = 165 cm  Weight = 77.8 kg  BSA = 1.89 m2       Medication: Rituximab  Dose: 375 mg/m2  Calculated Dose: 708.75 mg                             (In mg/m2, AUC, mg/kg)     Medication: Cytoxan  Dose: 750mg/m2  Calculated Dose: 1417.5 mg                             (In mg/m2, AUC, mg/kg)    Medication: Doxorubicin  Dose: 50mg/m2  Calculated Dose: 94.5 mg                             (In mg/m2, AUC, mg/kg)    Medication: Vincristine  Dose: 2mg fixed dose  Calculated Dose: 2 mg                             (In mg/m2, AUC, mg/kg)    I confirm this process was performed independently with the BSA and all final chemotherapy dosing calculations congruent.  Any discrepancies of 10% or greater have been addressed with the chemotherapy pharmacist. The resolution of the discrepancy has been documented in the EPIC progress notes.

## 2022-12-29 NOTE — PROGRESS NOTES
"Pharmacy Chemotherapy Calculations    Dx: Diffuse Large B-Cell Lymphoma    Cycle 4  Previous treatment = C3 on 12/8/22    Regimen: R-CHOP  *Dosing Reference*  riTUXimab 375 mg/m² IV on Day 1  Cyclophosphamide 750 mg/m² IV over 30 minutes on Day 1   DOXOrubicin 50 mg/m² IV push on Day 1   vinCRIStine 1.4 mg/m² (max dose of 2 mg) IV over 5-10 minutes on Day 1  Predinsone 100 mg PO daily on Days 1-5  21 day cycle for 4-6 cycles   NCCN Guidelines for B-Cell Lymphomas V.4.2022.  Randellier B, et al. Blood. 2010;116(12):2040-5.  Lashon DO, et al. J Clin Oncol. 2020;38(26):2822-8568.    Allergies:  Seasonal    /84   Pulse 90   Temp 36 °C (96.8 °F) (Temporal)   Resp 18   Ht 1.65 m (5' 4.96\")   Wt 77.8 kg (171 lb 8.3 oz)   SpO2 98%   BMI 28.58 kg/m²  Body surface area is 1.89 meters squared.     9/22/22 09:14   Hepatitis B Surface Antigen Non-Reactive   Hepatitis B Cors Ab,IgM Non-Reactive     9/30/22 ECHO LVEF ~55%    All labs (12/27/22) within treatment plan parameters.      riTUXImab-abbs (Truxima) 375 mg/m² x 1.89 m² = 709 mg   Rounded to nearest vial size (within 10%) per rounding protocol  Okay to treat with final dose  = 700 mg IV    Cyclophosphamide 750 mg/m² x 1.89 m² = 1417.5 mg   <10% difference, okay to treat with final dose = 1417.6 mg IV    DOXOrubicin 50 mg/m² x 1.89 m² = 94.5 mg   <10% difference, okay to treat with final dose = 94.5 mg IV    vinCRIStine 1.4 mg/m² x 1.89 m² = 2.65 mg (Max dose 2 mg)   <10% difference, okay to treat with final dose = 2 mg IV    Toshia Sams, PharmD  "

## 2022-12-29 NOTE — PROGRESS NOTES
Chemotherapy Verification - SECONDARY RN       Height = 1.65m  Weight = 77.8kg  BSA = 1.89m2       Medication: cyclophosphamide  Dose: 750mg/m2  Calculated Dose: 1417.5mg                             (In mg/m2, AUC, mg/kg)     Medication: doxorubicin  Dose: 50mg/m2  Calculated Dose: 94.5mg                             (In mg/m2, AUC, mg/kg)    Medication: vincristine  Dose: set by rule  Calculated Dose: 2mg                             (In mg/m2, AUC, mg/kg)        I confirm that this process was performed independently.

## 2022-12-30 ENCOUNTER — OUTPATIENT INFUSION SERVICES (OUTPATIENT)
Dept: ONCOLOGY | Facility: MEDICAL CENTER | Age: 52
End: 2022-12-30
Attending: STUDENT IN AN ORGANIZED HEALTH CARE EDUCATION/TRAINING PROGRAM
Payer: COMMERCIAL

## 2022-12-30 VITALS
OXYGEN SATURATION: 96 % | SYSTOLIC BLOOD PRESSURE: 128 MMHG | DIASTOLIC BLOOD PRESSURE: 78 MMHG | BODY MASS INDEX: 29.31 KG/M2 | HEART RATE: 85 BPM | WEIGHT: 175.93 LBS | TEMPERATURE: 97.6 F | RESPIRATION RATE: 18 BRPM | HEIGHT: 65 IN

## 2022-12-30 DIAGNOSIS — C83.31 DIFFUSE LARGE B-CELL LYMPHOMA OF LYMPH NODES OF NECK (HCC): ICD-10-CM

## 2022-12-30 PROCEDURE — 700111 HCHG RX REV CODE 636 W/ 250 OVERRIDE (IP): Performed by: NURSE PRACTITIONER

## 2022-12-30 PROCEDURE — 96372 THER/PROPH/DIAG INJ SC/IM: CPT

## 2022-12-30 RX ADMIN — PEGFILGRASTIM-CBQV 6 MG: 6 INJECTION, SOLUTION SUBCUTANEOUS at 17:24

## 2022-12-30 ASSESSMENT — FIBROSIS 4 INDEX: FIB4 SCORE: 0.7

## 2022-12-31 NOTE — PROGRESS NOTES
Patient arrived ambulatory to [facility] for Udenyca.  Reviewed plan of care and medication side effects, handout provided.  Patient verbalized understanding.  Patient denies any acute distress.  Udenyca given to right back arm, bandaid placed.  Patient tolerated well.  Confirmed next appointment and patient ambulated out of clinic.

## 2023-01-11 RX ORDER — EPINEPHRINE 1 MG/ML(1)
0.5 AMPUL (ML) INJECTION PRN
Status: CANCELLED | OUTPATIENT
Start: 2023-01-19

## 2023-01-11 RX ORDER — 0.9 % SODIUM CHLORIDE 0.9 %
3 VIAL (ML) INJECTION PRN
Status: CANCELLED | OUTPATIENT
Start: 2023-01-18

## 2023-01-11 RX ORDER — 0.9 % SODIUM CHLORIDE 0.9 %
10 VIAL (ML) INJECTION PRN
Status: CANCELLED | OUTPATIENT
Start: 2023-01-19

## 2023-01-11 RX ORDER — SODIUM CHLORIDE 9 MG/ML
500 INJECTION, SOLUTION INTRAVENOUS ONCE
Status: CANCELLED | OUTPATIENT
Start: 2023-01-19

## 2023-01-11 RX ORDER — PROCHLORPERAZINE MALEATE 10 MG
10 TABLET ORAL EVERY 6 HOURS PRN
Status: CANCELLED | OUTPATIENT
Start: 2023-01-19

## 2023-01-11 RX ORDER — ACETAMINOPHEN 325 MG/1
650 TABLET ORAL PRN
Status: CANCELLED | OUTPATIENT
Start: 2023-01-19 | End: 2023-01-21

## 2023-01-11 RX ORDER — HEPARIN SODIUM (PORCINE) LOCK FLUSH IV SOLN 100 UNIT/ML 100 UNIT/ML
500 SOLUTION INTRAVENOUS PRN
Status: CANCELLED | OUTPATIENT
Start: 2023-01-19

## 2023-01-11 RX ORDER — SODIUM CHLORIDE 9 MG/ML
500 INJECTION, SOLUTION INTRAVENOUS ONCE
Status: CANCELLED | OUTPATIENT
Start: 2023-01-19 | End: 2023-01-19

## 2023-01-11 RX ORDER — PREDNISONE 50 MG/1
100 TABLET ORAL ONCE
Status: CANCELLED | OUTPATIENT
Start: 2023-01-19 | End: 2023-01-19

## 2023-01-11 RX ORDER — 0.9 % SODIUM CHLORIDE 0.9 %
3 VIAL (ML) INJECTION PRN
Status: CANCELLED | OUTPATIENT
Start: 2023-01-19

## 2023-01-11 RX ORDER — 0.9 % SODIUM CHLORIDE 0.9 %
10 VIAL (ML) INJECTION PRN
Status: CANCELLED | OUTPATIENT
Start: 2023-01-18

## 2023-01-11 RX ORDER — HEPARIN SODIUM (PORCINE) LOCK FLUSH IV SOLN 100 UNIT/ML 100 UNIT/ML
500 SOLUTION INTRAVENOUS PRN
Status: CANCELLED | OUTPATIENT
Start: 2023-01-18

## 2023-01-11 RX ORDER — ONDANSETRON 2 MG/ML
4 INJECTION INTRAMUSCULAR; INTRAVENOUS PRN
Status: CANCELLED | OUTPATIENT
Start: 2023-01-19

## 2023-01-11 RX ORDER — ONDANSETRON 8 MG/1
8 TABLET, ORALLY DISINTEGRATING ORAL PRN
Status: CANCELLED | OUTPATIENT
Start: 2023-01-19

## 2023-01-11 RX ORDER — SODIUM CHLORIDE 9 MG/ML
INJECTION, SOLUTION INTRAVENOUS CONTINUOUS
Status: CANCELLED | OUTPATIENT
Start: 2023-01-19

## 2023-01-11 RX ORDER — 0.9 % SODIUM CHLORIDE 0.9 %
VIAL (ML) INJECTION PRN
Status: CANCELLED | OUTPATIENT
Start: 2023-01-18

## 2023-01-11 RX ORDER — DIPHENHYDRAMINE HYDROCHLORIDE 50 MG/ML
25 INJECTION INTRAMUSCULAR; INTRAVENOUS PRN
Status: CANCELLED | OUTPATIENT
Start: 2023-01-19 | End: 2023-01-21

## 2023-01-11 RX ORDER — DIPHENHYDRAMINE HYDROCHLORIDE 50 MG/ML
50 INJECTION INTRAMUSCULAR; INTRAVENOUS PRN
Status: CANCELLED | OUTPATIENT
Start: 2023-01-19

## 2023-01-11 RX ORDER — METHYLPREDNISOLONE SODIUM SUCCINATE 125 MG/2ML
125 INJECTION, POWDER, LYOPHILIZED, FOR SOLUTION INTRAMUSCULAR; INTRAVENOUS PRN
Status: CANCELLED | OUTPATIENT
Start: 2023-01-19

## 2023-01-11 RX ORDER — 0.9 % SODIUM CHLORIDE 0.9 %
VIAL (ML) INJECTION PRN
Status: CANCELLED | OUTPATIENT
Start: 2023-01-19

## 2023-01-11 RX ORDER — MEPERIDINE HYDROCHLORIDE 25 MG/ML
25 INJECTION INTRAMUSCULAR; INTRAVENOUS; SUBCUTANEOUS PRN
Status: CANCELLED | OUTPATIENT
Start: 2023-01-19 | End: 2023-01-21

## 2023-01-11 RX ORDER — ACETAMINOPHEN 325 MG/1
650 TABLET ORAL ONCE
Status: CANCELLED | OUTPATIENT
Start: 2023-01-19 | End: 2023-01-19

## 2023-01-18 ENCOUNTER — APPOINTMENT (OUTPATIENT)
Dept: HEMATOLOGY ONCOLOGY | Facility: MEDICAL CENTER | Age: 53
End: 2023-01-18
Payer: COMMERCIAL

## 2023-01-18 ENCOUNTER — PATIENT OUTREACH (OUTPATIENT)
Dept: ONCOLOGY | Facility: MEDICAL CENTER | Age: 53
End: 2023-01-18
Payer: COMMERCIAL

## 2023-01-18 ENCOUNTER — APPOINTMENT (OUTPATIENT)
Dept: ONCOLOGY | Facility: MEDICAL CENTER | Age: 53
End: 2023-01-18
Attending: STUDENT IN AN ORGANIZED HEALTH CARE EDUCATION/TRAINING PROGRAM
Payer: COMMERCIAL

## 2023-01-18 NOTE — PROGRESS NOTES
Oncology Nurse Navigator (ONN) Darby Wilcox reached out to patient to follow up on new referral.  No answer and voice mailbox has not been set up.  No additional phone numbers listed.  ONN introduction letter & [facility] Calendar sent to patient's personal email address today.

## 2023-01-19 ENCOUNTER — APPOINTMENT (OUTPATIENT)
Dept: ONCOLOGY | Facility: MEDICAL CENTER | Age: 53
End: 2023-01-19
Attending: STUDENT IN AN ORGANIZED HEALTH CARE EDUCATION/TRAINING PROGRAM
Payer: COMMERCIAL

## 2023-01-20 ENCOUNTER — APPOINTMENT (OUTPATIENT)
Dept: ONCOLOGY | Facility: MEDICAL CENTER | Age: 53
End: 2023-01-20
Attending: STUDENT IN AN ORGANIZED HEALTH CARE EDUCATION/TRAINING PROGRAM
Payer: COMMERCIAL

## 2023-01-26 ENCOUNTER — TELEPHONE (OUTPATIENT)
Dept: HEMATOLOGY ONCOLOGY | Facility: MEDICAL CENTER | Age: 53
End: 2023-01-26
Payer: COMMERCIAL

## 2023-01-26 NOTE — TELEPHONE ENCOUNTER
Attempted to call patient to let him know [facility] paperwork for his sister Dana is complete and they can pick it up at our office.  A copy has been sent through [facility].  Unable to leave VM.

## 2023-02-24 DIAGNOSIS — C83.31 DIFFUSE LARGE B-CELL LYMPHOMA OF LYMPH NODES OF NECK (HCC): ICD-10-CM

## 2023-03-01 ENCOUNTER — PATIENT OUTREACH (OUTPATIENT)
Dept: ONCOLOGY | Facility: MEDICAL CENTER | Age: 53
End: 2023-03-01
Payer: COMMERCIAL

## 2023-03-01 NOTE — PROGRESS NOTES
Patient's sister/caregiver, Dana, sent in email below 3/1/23 @ 1285:    Good afternoon.  This concerns my brother,   Azeem Mejia,   who was diagnosed with cancer, underwent chemotheraphy under the care of Dr. Kadi Kwan.  Azeem made a payment of $869.51 in one of the diagnostics performed: bone marrow scan.   Please see attached.  [facility] had initially said that the procedure was covered and the payment will be reimbursed.  How do we proceed to get the  reimbursement?    Please advise.  Thank you .    Dana Mejia   (Sister/Caregiver)    BECKY Wilcox emailed our Financial Resource Advocate (FRA) and created a FRA referral.  BECKY replied to patient's sister via email with an update and encouraged her to contact me for any additional questions or concerns.

## 2023-03-20 ENCOUNTER — HOSPITAL ENCOUNTER (OUTPATIENT)
Dept: HEMATOLOGY ONCOLOGY | Facility: MEDICAL CENTER | Age: 53
End: 2023-03-20
Attending: STUDENT IN AN ORGANIZED HEALTH CARE EDUCATION/TRAINING PROGRAM

## 2023-03-20 ENCOUNTER — HOSPITAL ENCOUNTER (OUTPATIENT)
Dept: HEMATOLOGY ONCOLOGY | Facility: MEDICAL CENTER | Age: 53
End: 2023-03-20
Attending: STUDENT IN AN ORGANIZED HEALTH CARE EDUCATION/TRAINING PROGRAM
Payer: COMMERCIAL

## 2023-03-20 VITALS
HEIGHT: 65 IN | WEIGHT: 169.31 LBS | SYSTOLIC BLOOD PRESSURE: 108 MMHG | HEART RATE: 94 BPM | BODY MASS INDEX: 28.21 KG/M2 | DIASTOLIC BLOOD PRESSURE: 68 MMHG | RESPIRATION RATE: 16 BRPM | OXYGEN SATURATION: 96 % | TEMPERATURE: 99 F

## 2023-03-20 DIAGNOSIS — C83.31 DIFFUSE LARGE B-CELL LYMPHOMA OF LYMPH NODES OF NECK (HCC): ICD-10-CM

## 2023-03-20 DIAGNOSIS — Z00.6 RESEARCH STUDY PATIENT: ICD-10-CM

## 2023-03-20 PROCEDURE — 99212 OFFICE O/P EST SF 10 MIN: CPT | Performed by: STUDENT IN AN ORGANIZED HEALTH CARE EDUCATION/TRAINING PROGRAM

## 2023-03-20 PROCEDURE — 99213 OFFICE O/P EST LOW 20 MIN: CPT | Performed by: STUDENT IN AN ORGANIZED HEALTH CARE EDUCATION/TRAINING PROGRAM

## 2023-03-20 RX ORDER — 0.9 % SODIUM CHLORIDE 0.9 %
20 VIAL (ML) INJECTION PRN
Status: CANCELLED | OUTPATIENT
Start: 2023-03-27

## 2023-03-20 RX ORDER — HEPARIN SODIUM (PORCINE) LOCK FLUSH IV SOLN 100 UNIT/ML 100 UNIT/ML
500 SOLUTION INTRAVENOUS PRN
Status: CANCELLED | OUTPATIENT
Start: 2023-03-27

## 2023-03-20 ASSESSMENT — ENCOUNTER SYMPTOMS
PALPITATIONS: 0
NAUSEA: 0
NECK PAIN: 0
DIZZINESS: 0
SORE THROAT: 0
FEVER: 0
COUGH: 0
TREMORS: 0
WEIGHT LOSS: 0
HEARTBURN: 0
SENSORY CHANGE: 0
SHORTNESS OF BREATH: 0
HEADACHES: 0
TINGLING: 0
WHEEZING: 0
BRUISES/BLEEDS EASILY: 0
VOMITING: 0
MEMORY LOSS: 0
DEPRESSION: 0
SPUTUM PRODUCTION: 0
BLURRED VISION: 0
FOCAL WEAKNESS: 0
CHILLS: 0
ORTHOPNEA: 0
ABDOMINAL PAIN: 0

## 2023-03-20 ASSESSMENT — FIBROSIS 4 INDEX: FIB4 SCORE: 0.7

## 2023-03-20 ASSESSMENT — PAIN SCALES - GENERAL: PAINLEVEL: NO PAIN

## 2023-03-20 NOTE — ADDENDUM NOTE
Encounter addended by: Jorge Espinoza on: 3/20/2023 1:59 PM   Actions taken: Charge Capture section accepted

## 2023-03-20 NOTE — PROGRESS NOTES
Consult Note: Hematology/Oncology     Referring Provider: Peggy Kinsey  Primary Care:  Carlos Mcnamara M.D.    Chief Complaint   Patient presents with    Lymphoma     3 month FV        Current Treatment:   10/27/22: C1D1 RCHOP  11/17/22: C2D1 RCHOP  12/08/22: C3D1 RCHOP  12/29/22: C4D1 RCHOP    Prior Treatment: None    Subjective:   History of Presenting Illness:  Azeem Mejia is a 52 y.o. male who presents today with a new diagnosis of diffuse large B-cell lymphoma.    Patient was originally seen by his PCP on August 17, 2022 with a submandibular lymphadenopathy.  He reported a 2-week history of lymphadenopathy.  Blood work at that time showed no abnormalities.  Patient underwent an ultrasound the same day which showed a 3.1 x 1.7 x 2.1 cm lymph node on the left submandibular region.  He was referred to Peggy on August 30, 2022 for submandibular lymphadenopathy work-up.    Patient underwent a CT with contrast which showed a 2.2 x 2.5 x 3.4 cm mass.  And he underwent a biopsy on September 7 with initial pathology showing a B-cell lymphoproliferative disorder with dim CD10 expression.  FISH analysis was negative for CCN D1 IGH, Bcl-2, BCL6, MYC.  Final path showed diffuse large B-cell lymphoma with germinal center B type.  C-Myc was negative.  Patient is not a double hit expresser.    He works in [employer].  He was a major in literature. 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     Interval History:    Patient reports that he is doing really well.  He is eating heathy and raw foods.      He continues to work night shift and is here after his shift today.    Unfortunately, he didn't get his labs done.          Past Medical History:   Diagnosis Date    Asthma     Cancer (HCC) 10/21/2022    left submandibular lymphoma large B cell    Dental disorder 10/21/2022    upper partial, lower  left front times 1 cap    Diffuse large B-cell lymphoma of lymph nodes of neck (HCC)     Hyperlipidemia         Past Surgical History:   Procedure Laterality Date    CA DX BONE MARROW ASPIRATIONS Left 9/27/2022    Procedure: BONE MARROW CORE AND ASPIRATION;  Surgeon: Subhash Mathews M.D.;  Location: SURGERY SAME DAY [facility];  Service: Orthopedics    CA DX BONE MARROW BIOPSIES Left 9/27/2022    Procedure: BIOPSY, BONE MARROW, USING NEEDLE OR TROCAR - FLEMING;  Surgeon: Subhash Mathews M.D.;  Location: SURGERY SAME DAY [facility];  Service: Orthopedics    NASAL POLYPECTOMY Right        Social History     Tobacco Use    Smoking status: Never    Smokeless tobacco: Never   Vaping Use    Vaping Use: Never used   Substance Use Topics    Alcohol use: Yes     Comment: very rare    Drug use: Not Currently        Family History   Problem Relation Age of Onset    Diabetes Father     Asthma Sister     Hyperlipidemia Neg Hx     Hypertension Neg Hx     Cancer Neg Hx        Allergies   Allergen Reactions    Seasonal Runny Nose and Itching     Eyes and nose       Current Outpatient Medications   Medication Sig Dispense Refill    acetaminophen (TYLENOL) 500 MG Tab Take 500 mg by mouth every 6 hours as needed. Indications: Pain      prochlorperazine (COMPAZINE) 10 MG Tab Take 1 Tablet by mouth every 6 hours as needed (for nausea, vomiting). 30 Tablet 6    ondansetron (ZOFRAN) 4 MG Tab tablet Take 1 Tablet by mouth every four hours as needed for Nausea/Vomiting (for nausea, vomiting). 30 Tablet 6    allopurinol (ZYLOPRIM) 300 MG Tab Take 1 Tablet by mouth every day. (Patient not taking: Reported on 3/20/2023) 30 Tablet 5    lidocaine-prilocaine (EMLA) 2.5-2.5 % Cream Apply to port 1 hour prior to access of port and cover with plastic wrap. (Patient not taking: Reported on 3/20/2023) 30 g 3    predniSONE (DELTASONE) 50 MG Tab Take 1 Tablet by mouth every day. Take 2 tablets (100mg) on days 1-5 of chemotherapy (Patient not taking: Reported on  "3/20/2023)       No current facility-administered medications for this encounter.       Review of Systems   Constitutional:  Positive for malaise/fatigue. Negative for chills, fever and weight loss.   HENT:  Negative for congestion, ear pain, nosebleeds and sore throat.    Eyes:  Negative for blurred vision.   Respiratory:  Negative for cough, sputum production, shortness of breath and wheezing.    Cardiovascular:  Negative for chest pain, palpitations, orthopnea and leg swelling.   Gastrointestinal:  Negative for abdominal pain, heartburn, nausea and vomiting.   Genitourinary:  Negative for dysuria, frequency and urgency.   Musculoskeletal:  Negative for neck pain.   Neurological:  Negative for dizziness, tingling, tremors, sensory change, focal weakness and headaches.   Endo/Heme/Allergies:  Does not bruise/bleed easily.   Psychiatric/Behavioral:  Negative for depression, memory loss and suicidal ideas.    All other systems reviewed and are negative.    Problem list, medications, and allergies reviewed by myself today in Epic.     Objective:     Vitals:    03/20/23 1258   BP: 108/68   BP Location: Right arm   Patient Position: Sitting   BP Cuff Size: Adult   Pulse: 94   Resp: 16   Temp: 37.2 °C (99 °F)   TempSrc: Temporal   SpO2: 96%   Weight: 76.8 kg (169 lb 5 oz)   Height: 1.65 m (5' 4.96\")       DESC; KARNOFSKY SCALE WITH ECOG EQUIVALENT: 90, Able to carry on normal activity; minor signs or symptoms of disease (ECOG equivalent 0)    DISTRESS LEVEL: no apparent distress    Physical Exam  Constitutional:       General: He is not in acute distress.     Appearance: Normal appearance.      Comments: Port in place, no sign of infection   HENT:      Head: Normocephalic and atraumatic.      Nose: Nose normal. No congestion.      Mouth/Throat:      Mouth: Mucous membranes are moist.      Pharynx: Oropharynx is clear.   Eyes:      General: No scleral icterus.     Conjunctiva/sclera: Conjunctivae normal.      Pupils: " Pupils are equal, round, and reactive to light.   Cardiovascular:      Rate and Rhythm: Normal rate and regular rhythm.      Pulses: Normal pulses.      Heart sounds: No murmur heard.    No friction rub.   Pulmonary:      Effort: Pulmonary effort is normal. No respiratory distress.      Breath sounds: Normal breath sounds. No stridor. No wheezing or rales.   Chest:      Chest wall: No tenderness.   Abdominal:      General: Abdomen is flat. Bowel sounds are normal. There is no distension.      Palpations: Abdomen is soft. There is no mass.      Tenderness: There is no abdominal tenderness. There is no guarding or rebound.   Musculoskeletal:         General: No swelling, tenderness or deformity. Normal range of motion.      Cervical back: Normal range of motion and neck supple. No rigidity or tenderness.      Right lower leg: No edema.      Left lower leg: No edema.   Skin:     General: Skin is warm.      Coloration: Skin is not jaundiced or pale.      Findings: No bruising or rash.   Neurological:      General: No focal deficit present.      Mental Status: He is alert and oriented to person, place, and time. Mental status is at baseline.      Motor: No weakness.   Psychiatric:         Mood and Affect: Mood normal.         Behavior: Behavior normal.         Thought Content: Thought content normal.         Judgment: Judgment normal.       Labs:   Most recent labs reviewed.  CBC and CMP  grossly normal.  HIV negative hepatitis B negative LDH normal uric acid normal     Imaging:   Most recent images below have been independently reviewed by me.      ECHO  Normal left ventricular size, thickness, and systolic function.  The left ventricular ejection fraction is visually estimated to be 55%.  Mild hypokinesis basal posterior wall otherwise normal regional wall   motion.  Normal right ventricular size and systolic function.  No prior study is available for comparison    PET SCAN    1. Interval decrease in size of the left  submandibular lymph node mass, measuring 1.3 x 1.9 cm, previously 2.5 x 3.5 cm. The mass demonstrates mild increased uptake (SUV max 5.6 )     2. Additional uptake in the nonenlarged left level 2/3 lymph nodes (SUV max 6.7) and right level 2 lymph nodes (SUV max 5.3) could relate to lymphoma as well      PET scan 12/27/22     1. Complete response to therapy with resolution of prior left submandibular lymph node mass. Deauville score is 1.     2. No increased uptake in the bilateral neck. No enlarged lymph nodes in the neck.     Pathology:  FINAL DIAGNOSIS:     A. Left submandibular node core biopsy:          Diffuse large B cell lymphoma, GCB type.          C-MYC IHC is negative which shows this is not a double           expressor.     Synoptic Summary for Non-Hodgkin Lymphoma/Lymphoid Neoplasms:          -Specimen: Lymph node        -Procedure: Core biopsy        -Tumor site: Left submandibular        -Histologic type: Diffuse large B cell lymphoma, GCB        -Immunophenotyping:            Flow cytometry: Performed; please refer to microscopic             description for details.            Immunohistochemistry: Performed; please refer to microscopic             description for details.        -Cytogenetic Studies: Not performed.        -Molecular Genetic Studies: FISH is negative for Bcl-2, Bcl-6,         C-MYC and t(11;14)- negative for double/triple hit.            Comment: One area of the core shows lower grade cells that have           a lower Ki-67 of   10% and may either represent either only a           partially involved node or an area of lower grade lymphoma such           as follicular lymphoma.  If this distinction is clinically           important than an excision of the whole node is necessary for           evaluation.  This case has been reviewed by a second           pathologist, Dr. Villanueva, who concurs with the diagnosis.       Bone Marrow Biopsy  FINAL DIAGNOSIS:     Peripheral blood smear and  CBC:          Normal white blood cell count demonstrating a mild relative           monocytosis.   Bone marrow aspirate, clot, and core biopsy:          -Normal cellular bone marrow demonstrating:          -There is no morphologic, immunohistochemical or flow cytometric           evidence for bone marrow involvement by lymphoma.          -Trilineage hematopoiesis with maturation.          -No morphologic increase in blast cells.          -Focal dysmegakaryocytopoiesis (<10%).          -Mild increased polyclonal plasma cells.          -Adequate stainable bone marrow iron stores.          See comment.         Assessment/Plan:      Cancer Staging   Diffuse large B-cell lymphoma of lymph nodes of neck (HCC)  Staging form: Hodgkin and Non-Hodgkin Lymphoma, AJCC 8th Edition  - Clinical stage from 10/4/2022: Stage IIE (Diffuse large B-cell lymphoma) - Signed by Henna Kwan M.D. on 10/4/2022         Mr. Mejia is a 51 yo M who presents today 3 months after RCHOP treatment for DLBCL. He is now in surveillance.     Surveillance will consist of:  -HP and labs every 3-6 months for 5 years and then yearly or as clinically indicated  Managing will consist of a CT chest abdomen pelvis with contrast no more often than every 6 months for 2 years    Plan:  -CT CAP in 3 months (6 months after last PET scan)  -patient to get labs today and then again prior to CBC, CMP, LDH  -will call patient with labs results  -patient has PORT in place, will need it flushed      Any questions and concerns raised by the patient were addressed and answered. Patient denies any further questions.  Patient encouraged to call the office with any concerns or issues.     Henna Kwan M.D.  Hematology/Oncology    24 minutes spent on this case

## 2023-03-27 ENCOUNTER — OUTPATIENT INFUSION SERVICES (OUTPATIENT)
Dept: ONCOLOGY | Facility: MEDICAL CENTER | Age: 53
End: 2023-03-27
Attending: PSYCHIATRY & NEUROLOGY
Payer: COMMERCIAL

## 2023-03-27 VITALS
OXYGEN SATURATION: 97 % | HEART RATE: 95 BPM | TEMPERATURE: 97.9 F | SYSTOLIC BLOOD PRESSURE: 136 MMHG | RESPIRATION RATE: 18 BRPM | DIASTOLIC BLOOD PRESSURE: 82 MMHG

## 2023-03-27 DIAGNOSIS — C83.31 DIFFUSE LARGE B-CELL LYMPHOMA OF LYMPH NODES OF NECK (HCC): ICD-10-CM

## 2023-03-27 LAB
ALBUMIN SERPL BCP-MCNC: 4.1 G/DL (ref 3.2–4.9)
ALBUMIN/GLOB SERPL: 1.5 G/DL
ALP SERPL-CCNC: 86 U/L (ref 30–99)
ALT SERPL-CCNC: 29 U/L (ref 2–50)
ANION GAP SERPL CALC-SCNC: 12 MMOL/L (ref 7–16)
AST SERPL-CCNC: 27 U/L (ref 12–45)
BASOPHILS # BLD AUTO: 0.6 % (ref 0–1.8)
BASOPHILS # BLD: 0.03 K/UL (ref 0–0.12)
BILIRUB SERPL-MCNC: 0.5 MG/DL (ref 0.1–1.5)
BUN SERPL-MCNC: 11 MG/DL (ref 8–22)
CALCIUM ALBUM COR SERPL-MCNC: 8.7 MG/DL (ref 8.5–10.5)
CALCIUM SERPL-MCNC: 8.8 MG/DL (ref 8.5–10.5)
CHLORIDE SERPL-SCNC: 99 MMOL/L (ref 96–112)
CO2 SERPL-SCNC: 23 MMOL/L (ref 20–33)
CREAT SERPL-MCNC: 0.64 MG/DL (ref 0.5–1.4)
EOSINOPHIL # BLD AUTO: 0.14 K/UL (ref 0–0.51)
EOSINOPHIL NFR BLD: 2.7 % (ref 0–6.9)
ERYTHROCYTE [DISTWIDTH] IN BLOOD BY AUTOMATED COUNT: 40 FL (ref 35.9–50)
GFR SERPLBLD CREATININE-BSD FMLA CKD-EPI: 113 ML/MIN/1.73 M 2
GLOBULIN SER CALC-MCNC: 2.8 G/DL (ref 1.9–3.5)
GLUCOSE SERPL-MCNC: 149 MG/DL (ref 65–99)
HCT VFR BLD AUTO: 42.9 % (ref 42–52)
HGB BLD-MCNC: 14.5 G/DL (ref 14–18)
IMM GRANULOCYTES # BLD AUTO: 0.02 K/UL (ref 0–0.11)
IMM GRANULOCYTES NFR BLD AUTO: 0.4 % (ref 0–0.9)
LDH SERPL L TO P-CCNC: 220 U/L (ref 107–266)
LYMPHOCYTES # BLD AUTO: 2.21 K/UL (ref 1–4.8)
LYMPHOCYTES NFR BLD: 42.8 % (ref 22–41)
MCH RBC QN AUTO: 32.1 PG (ref 27–33)
MCHC RBC AUTO-ENTMCNC: 33.8 G/DL (ref 33.7–35.3)
MCV RBC AUTO: 94.9 FL (ref 81.4–97.8)
MONOCYTES # BLD AUTO: 0.39 K/UL (ref 0–0.85)
MONOCYTES NFR BLD AUTO: 7.6 % (ref 0–13.4)
NEUTROPHILS # BLD AUTO: 2.37 K/UL (ref 1.82–7.42)
NEUTROPHILS NFR BLD: 45.9 % (ref 44–72)
NRBC # BLD AUTO: 0 K/UL
NRBC BLD-RTO: 0 /100 WBC
PLATELET # BLD AUTO: 204 K/UL (ref 164–446)
PMV BLD AUTO: 8.5 FL (ref 9–12.9)
POTASSIUM SERPL-SCNC: 3.7 MMOL/L (ref 3.6–5.5)
PROT SERPL-MCNC: 6.9 G/DL (ref 6–8.2)
RBC # BLD AUTO: 4.52 M/UL (ref 4.7–6.1)
SODIUM SERPL-SCNC: 134 MMOL/L (ref 135–145)
WBC # BLD AUTO: 5.2 K/UL (ref 4.8–10.8)

## 2023-03-27 PROCEDURE — 700111 HCHG RX REV CODE 636 W/ 250 OVERRIDE (IP)

## 2023-03-27 PROCEDURE — 85025 COMPLETE CBC W/AUTO DIFF WBC: CPT

## 2023-03-27 PROCEDURE — 80053 COMPREHEN METABOLIC PANEL: CPT

## 2023-03-27 PROCEDURE — A4212 NON CORING NEEDLE OR STYLET: HCPCS

## 2023-03-27 PROCEDURE — 83615 LACTATE (LD) (LDH) ENZYME: CPT

## 2023-03-27 RX ORDER — HEPARIN SODIUM (PORCINE) LOCK FLUSH IV SOLN 100 UNIT/ML 100 UNIT/ML
SOLUTION INTRAVENOUS
Status: COMPLETED
Start: 2023-03-27 | End: 2023-03-27

## 2023-03-27 RX ADMIN — HEPARIN 500 UNITS: 100 SYRINGE at 10:38

## 2023-03-27 NOTE — PROGRESS NOTES
Pt arrived ambulatory to [facility] for port flush,  POC discussed, port accessed in sterile field, brisk blood return noted.  Port flushed per policy, de-accessed, and site covered with gauze and tape.  Next appointment confirmed.  Pt discharged from IS in NAD under self care.

## 2023-05-14 LAB
APOB+LDLR+PCSK9 GENE MUT ANL BLD/T: NOT DETECTED
BRCA1+BRCA2 DEL+DUP + FULL MUT ANL BLD/T: NOT DETECTED
MLH1+MSH2+MSH6+PMS2 GN DEL+DUP+FUL M: NOT DETECTED

## 2023-05-29 ENCOUNTER — APPOINTMENT (OUTPATIENT)
Dept: RADIOLOGY | Facility: MEDICAL CENTER | Age: 53
End: 2023-05-29
Attending: STUDENT IN AN ORGANIZED HEALTH CARE EDUCATION/TRAINING PROGRAM
Payer: COMMERCIAL

## 2023-05-29 DIAGNOSIS — C83.31 DIFFUSE LARGE B-CELL LYMPHOMA OF LYMPH NODES OF NECK (HCC): ICD-10-CM

## 2023-05-29 PROCEDURE — 700117 HCHG RX CONTRAST REV CODE 255: Performed by: STUDENT IN AN ORGANIZED HEALTH CARE EDUCATION/TRAINING PROGRAM

## 2023-05-29 PROCEDURE — 71260 CT THORAX DX C+: CPT

## 2023-05-29 RX ADMIN — IOHEXOL 25 ML: 240 INJECTION, SOLUTION INTRATHECAL; INTRAVASCULAR; INTRAVENOUS; ORAL at 11:33

## 2023-05-29 RX ADMIN — IOHEXOL 100 ML: 350 INJECTION, SOLUTION INTRAVENOUS at 11:33

## 2023-05-30 ENCOUNTER — HOSPITAL ENCOUNTER (OUTPATIENT)
Dept: HEMATOLOGY ONCOLOGY | Facility: MEDICAL CENTER | Age: 53
End: 2023-05-30
Attending: STUDENT IN AN ORGANIZED HEALTH CARE EDUCATION/TRAINING PROGRAM
Payer: COMMERCIAL

## 2023-05-30 VITALS
HEART RATE: 68 BPM | BODY MASS INDEX: 27.79 KG/M2 | TEMPERATURE: 97.7 F | DIASTOLIC BLOOD PRESSURE: 68 MMHG | SYSTOLIC BLOOD PRESSURE: 100 MMHG | RESPIRATION RATE: 16 BRPM | OXYGEN SATURATION: 95 % | WEIGHT: 166.78 LBS | HEIGHT: 65 IN

## 2023-05-30 DIAGNOSIS — C83.31 DIFFUSE LARGE B-CELL LYMPHOMA OF LYMPH NODES OF NECK (HCC): ICD-10-CM

## 2023-05-30 PROCEDURE — 99214 OFFICE O/P EST MOD 30 MIN: CPT | Performed by: STUDENT IN AN ORGANIZED HEALTH CARE EDUCATION/TRAINING PROGRAM

## 2023-05-30 PROCEDURE — 99212 OFFICE O/P EST SF 10 MIN: CPT | Performed by: STUDENT IN AN ORGANIZED HEALTH CARE EDUCATION/TRAINING PROGRAM

## 2023-05-30 ASSESSMENT — ENCOUNTER SYMPTOMS
DIZZINESS: 0
HEARTBURN: 0
COUGH: 0
BRUISES/BLEEDS EASILY: 0
HEADACHES: 0
DEPRESSION: 0
ORTHOPNEA: 0
VOMITING: 0
MEMORY LOSS: 0
ABDOMINAL PAIN: 0
WHEEZING: 0
NECK PAIN: 0
SORE THROAT: 0
BLURRED VISION: 0
TREMORS: 0
WEIGHT LOSS: 0
CHILLS: 0
NAUSEA: 0
SPUTUM PRODUCTION: 0
TINGLING: 0
SENSORY CHANGE: 0
SHORTNESS OF BREATH: 0
PALPITATIONS: 0
FEVER: 0
FOCAL WEAKNESS: 0

## 2023-05-30 ASSESSMENT — FIBROSIS 4 INDEX: FIB4 SCORE: 1.3

## 2023-05-30 NOTE — ADDENDUM NOTE
Encounter addended by: Jorge Espinoza on: 5/30/2023 8:21 AM   Actions taken: Charge Capture section accepted

## 2023-05-30 NOTE — PROGRESS NOTES
Consult Note: Hematology/Oncology     Referring Provider: Peggy Kinsey  Primary Care:  Carlos Mcnamara M.D.    Chief Complaint   Patient presents with    Lymphoma     Follow up        Current Treatment:   10/27/22: C1D1 RCHOP  11/17/22: C2D1 RCHOP  12/08/22: C3D1 RCHOP  12/29/22: C4D1 RCHOP    Prior Treatment: None    Subjective:   History of Presenting Illness:  Azeem Mejia is a 52 y.o. male who presents today with a new diagnosis of diffuse large B-cell lymphoma.    Patient was originally seen by his PCP on August 17, 2022 with a submandibular lymphadenopathy.  He reported a 2-week history of lymphadenopathy.  Blood work at that time showed no abnormalities.  Patient underwent an ultrasound the same day which showed a 3.1 x 1.7 x 2.1 cm lymph node on the left submandibular region.  He was referred to Peggy on August 30, 2022 for submandibular lymphadenopathy work-up.    Patient underwent a CT with contrast which showed a 2.2 x 2.5 x 3.4 cm mass.  And he underwent a biopsy on September 7 with initial pathology showing a B-cell lymphoproliferative disorder with dim CD10 expression.  FISH analysis was negative for CCN D1 IGH, Bcl-2, BCL6, MYC.  Final path showed diffuse large B-cell lymphoma with germinal center B type.  C-Myc was negative.  Patient is not a double hit expresser.    He works in [employer].  He was a major in literature. 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     Interval History:    Patient reports that he is doing really well.  He is eating heathy except for this past week andhe has been eating some fast food.    He reports he is normally very patient and kind but at work lately he is irritated and frustrated. He is wondering if this is a s/e of his situation.     He continues to work night shift and is here after his shift today.    We reviewed his CT scan, which  shows no concern for disease.         Past Medical History:   Diagnosis Date    Asthma     Cancer (HCC) 10/21/2022    left submandibular lymphoma large B cell    Dental disorder 10/21/2022    upper partial, lower left front times 1 cap    Diffuse large B-cell lymphoma of lymph nodes of neck (HCC)     Hyperlipidemia         Past Surgical History:   Procedure Laterality Date    [place] DX BONE MARROW ASPIRATIONS Left 9/27/2022    Procedure: BONE MARROW CORE AND ASPIRATION;  Surgeon: Subhash Mathews M.D.;  Location: SURGERY SAME DAY [facility];  Service: Orthopedics    [place] DX BONE MARROW BIOPSIES Left 9/27/2022    Procedure: BIOPSY, BONE MARROW, USING NEEDLE OR TROCAR - FLEMING;  Surgeon: Subhash Mathews M.D.;  Location: SURGERY SAME DAY [facility];  Service: Orthopedics    NASAL POLYPECTOMY Right        Social History     Tobacco Use    Smoking status: Never    Smokeless tobacco: Never   Vaping Use    Vaping Use: Never used   Substance Use Topics    Alcohol use: Yes     Comment: very rare    Drug use: Not Currently        Family History   Problem Relation Age of Onset    Diabetes Father     Asthma Sister     Hyperlipidemia Neg Hx     Hypertension Neg Hx     Cancer Neg Hx        Allergies   Allergen Reactions    Seasonal Runny Nose and Itching     Eyes and nose       Current Outpatient Medications   Medication Sig Dispense Refill    acetaminophen (TYLENOL) 500 MG Tab Take 500 mg by mouth every 6 hours as needed. Indications: Pain      lidocaine-prilocaine (EMLA) 2.5-2.5 % Cream Apply to port 1 hour prior to access of port and cover with plastic wrap. (Patient not taking: Reported on 3/20/2023) 30 g 3    predniSONE (DELTASONE) 50 MG Tab Take 1 Tablet by mouth every day. Take 2 tablets (100mg) on days 1-5 of chemotherapy (Patient not taking: Reported on 3/20/2023)       No current facility-administered medications for this encounter.       Review of Systems   Constitutional:  Positive for malaise/fatigue. Negative for chills, fever and  "weight loss.   HENT:  Negative for congestion, ear pain, nosebleeds and sore throat.    Eyes:  Negative for blurred vision.   Respiratory:  Negative for cough, sputum production, shortness of breath and wheezing.    Cardiovascular:  Negative for chest pain, palpitations, orthopnea and leg swelling.   Gastrointestinal:  Negative for abdominal pain, heartburn, nausea and vomiting.   Genitourinary:  Negative for dysuria, frequency and urgency.   Musculoskeletal:  Negative for neck pain.   Neurological:  Negative for dizziness, tingling, tremors, sensory change, focal weakness and headaches.   Endo/Heme/Allergies:  Does not bruise/bleed easily.   Psychiatric/Behavioral:  Negative for depression, memory loss and suicidal ideas.         Slightly irritated at times   All other systems reviewed and are negative.      Problem list, medications, and allergies reviewed by myself today in Epic.     Objective:     Vitals:    05/30/23 0756   BP: 100/68   BP Location: Right arm   Patient Position: Sitting   BP Cuff Size: Adult   Pulse: 68   Resp: 16   Temp: 36.5 °C (97.7 °F)   TempSrc: Temporal   SpO2: 95%   Weight: 75.7 kg (166 lb 12.5 oz)   Height: 1.65 m (5' 4.96\")       DESC; KARNOFSKY SCALE WITH ECOG EQUIVALENT: 90, Able to carry on normal activity; minor signs or symptoms of disease (ECOG equivalent 0)    DISTRESS LEVEL: no apparent distress    Physical Exam  Constitutional:       General: He is not in acute distress.     Appearance: Normal appearance.      Comments: Port in place, no sign of infection   HENT:      Head: Normocephalic and atraumatic.      Nose: Nose normal. No congestion.      Mouth/Throat:      Mouth: Mucous membranes are moist.      Pharynx: Oropharynx is clear.   Eyes:      General: No scleral icterus.     Conjunctiva/sclera: Conjunctivae normal.      Pupils: Pupils are equal, round, and reactive to light.   Cardiovascular:      Rate and Rhythm: Normal rate and regular rhythm.      Pulses: Normal pulses. "      Heart sounds: No murmur heard.     No friction rub.   Pulmonary:      Effort: Pulmonary effort is normal. No respiratory distress.      Breath sounds: Normal breath sounds. No stridor. No wheezing or rales.   Chest:      Chest wall: No tenderness.   Abdominal:      General: Abdomen is flat. Bowel sounds are normal. There is no distension.      Palpations: Abdomen is soft. There is no mass.      Tenderness: There is no abdominal tenderness. There is no guarding or rebound.   Musculoskeletal:         General: No swelling, tenderness or deformity. Normal range of motion.      Cervical back: Normal range of motion and neck supple. No rigidity or tenderness.      Right lower leg: No edema.      Left lower leg: No edema.   Skin:     General: Skin is warm.      Coloration: Skin is not jaundiced or pale.      Findings: No bruising or rash.   Neurological:      General: No focal deficit present.      Mental Status: He is alert and oriented to person, place, and time. Mental status is at baseline.      Motor: No weakness.   Psychiatric:         Mood and Affect: Mood normal.         Behavior: Behavior normal.         Thought Content: Thought content normal.         Judgment: Judgment normal.         Labs:   Most recent labs reviewed.  CBC and CMP  grossly normal.  HIV negative hepatitis B negative LDH normal uric acid normal     Imaging:   Most recent images below have been independently reviewed by me.      CT CAP  IMPRESSION:     1.  No lymphadenopathy in the chest, abdomen or pelvis. No splenomegaly.  2.  Stable 5 mm subpleural nodule in the right lung apex. No new pulmonary nodules or masses.    ECHO  Normal left ventricular size, thickness, and systolic function.  The left ventricular ejection fraction is visually estimated to be 55%.  Mild hypokinesis basal posterior wall otherwise normal regional wall   motion.  Normal right ventricular size and systolic function.  No prior study is available for comparison    PET  SCAN    1. Interval decrease in size of the left submandibular lymph node mass, measuring 1.3 x 1.9 cm, previously 2.5 x 3.5 cm. The mass demonstrates mild increased uptake (SUV max 5.6 )     2. Additional uptake in the nonenlarged left level 2/3 lymph nodes (SUV max 6.7) and right level 2 lymph nodes (SUV max 5.3) could relate to lymphoma as well      PET scan 12/27/22     1. Complete response to therapy with resolution of prior left submandibular lymph node mass. Deauville score is 1.     2. No increased uptake in the bilateral neck. No enlarged lymph nodes in the neck.     Pathology:  FINAL DIAGNOSIS:     A. Left submandibular node core biopsy:          Diffuse large B cell lymphoma, GCB type.          C-MYC IHC is negative which shows this is not a double           expressor.     Synoptic Summary for Non-Hodgkin Lymphoma/Lymphoid Neoplasms:          -Specimen: Lymph node        -Procedure: Core biopsy        -Tumor site: Left submandibular        -Histologic type: Diffuse large B cell lymphoma, GCB        -Immunophenotyping:            Flow cytometry: Performed; please refer to microscopic             description for details.            Immunohistochemistry: Performed; please refer to microscopic             description for details.        -Cytogenetic Studies: Not performed.        -Molecular Genetic Studies: FISH is negative for Bcl-2, Bcl-6,         C-MYC and t(11;14)- negative for double/triple hit.            Comment: One area of the core shows lower grade cells that have           a lower Ki-67 of   10% and may either represent either only a           partially involved node or an area of lower grade lymphoma such           as follicular lymphoma.  If this distinction is clinically           important than an excision of the whole node is necessary for           evaluation.  This case has been reviewed by a second           pathologist, Dr. Villanueva, who concurs with the diagnosis.       Bone Marrow  Biopsy  FINAL DIAGNOSIS:     Peripheral blood smear and CBC:          Normal white blood cell count demonstrating a mild relative           monocytosis.   Bone marrow aspirate, clot, and core biopsy:          -Normal cellular bone marrow demonstrating:          -There is no morphologic, immunohistochemical or flow cytometric           evidence for bone marrow involvement by lymphoma.          -Trilineage hematopoiesis with maturation.          -No morphologic increase in blast cells.          -Focal dysmegakaryocytopoiesis (<10%).          -Mild increased polyclonal plasma cells.          -Adequate stainable bone marrow iron stores.          See comment.         Assessment/Plan:      Cancer Staging   Diffuse large B-cell lymphoma of lymph nodes of neck (HCC)  Staging form: Hodgkin and Non-Hodgkin Lymphoma, AJCC 8th Edition  - Clinical stage from 10/4/2022: Stage IIE (Diffuse large B-cell lymphoma) - Signed by Henna Kwan M.D. on 10/4/2022         Mr. Mejia is a 53 yo M who presents today 3 months after RCHOP treatment for DLBCL. He is now in surveillance.     Most recent scan shows no LAD. He is doing well.     Surveillance will consist of:  -HP and labs every 3-6 months for 5 years and then yearly or as clinically indicated  Managing will consist of a CT chest abdomen pelvis with contrast no more often than every 6 months for 2 years    Plan:  -HP in 3 months (8/30/23)  -CT CAP in 6 months (11/30/23)  -patient to get labs CBC, CMP, LDH   -patient has PORT in place, will need it flushed  -encouraged patient to meet with psychologist to discuss cancer experience and stress he is feeling, he has a service through work    Any questions and concerns raised by the patient were addressed and answered. Patient denies any further questions.  Patient encouraged to call the office with any concerns or issues.     Henna Kwan M.D.  Hematology/Oncology    26 minutes spent on this case

## 2023-06-05 RX ORDER — 0.9 % SODIUM CHLORIDE 0.9 %
20 VIAL (ML) INJECTION PRN
Status: CANCELLED | OUTPATIENT
Start: 2023-07-11

## 2023-06-05 RX ORDER — HEPARIN SODIUM (PORCINE) LOCK FLUSH IV SOLN 100 UNIT/ML 100 UNIT/ML
500 SOLUTION INTRAVENOUS PRN
Status: CANCELLED | OUTPATIENT
Start: 2023-06-12

## 2023-06-05 RX ORDER — HEPARIN SODIUM (PORCINE) LOCK FLUSH IV SOLN 100 UNIT/ML 100 UNIT/ML
500 SOLUTION INTRAVENOUS PRN
Status: CANCELLED | OUTPATIENT
Start: 2023-07-11

## 2023-06-05 RX ORDER — HEPARIN SODIUM (PORCINE) LOCK FLUSH IV SOLN 100 UNIT/ML 100 UNIT/ML
500 SOLUTION INTRAVENOUS PRN
Status: CANCELLED | OUTPATIENT
Start: 2023-08-08

## 2023-06-05 RX ORDER — 0.9 % SODIUM CHLORIDE 0.9 %
20 VIAL (ML) INJECTION PRN
Status: CANCELLED | OUTPATIENT
Start: 2023-06-12

## 2023-06-05 RX ORDER — 0.9 % SODIUM CHLORIDE 0.9 %
20 VIAL (ML) INJECTION PRN
Status: CANCELLED | OUTPATIENT
Start: 2023-08-08

## 2023-06-07 DIAGNOSIS — Z95.828 PORT-A-CATH IN PLACE: ICD-10-CM

## 2023-06-12 ENCOUNTER — OUTPATIENT INFUSION SERVICES (OUTPATIENT)
Dept: ONCOLOGY | Facility: MEDICAL CENTER | Age: 53
End: 2023-06-12
Attending: STUDENT IN AN ORGANIZED HEALTH CARE EDUCATION/TRAINING PROGRAM
Payer: COMMERCIAL

## 2023-06-12 VITALS
TEMPERATURE: 98 F | OXYGEN SATURATION: 96 % | DIASTOLIC BLOOD PRESSURE: 85 MMHG | RESPIRATION RATE: 18 BRPM | SYSTOLIC BLOOD PRESSURE: 125 MMHG | HEART RATE: 72 BPM

## 2023-06-12 DIAGNOSIS — C83.31 DIFFUSE LARGE B-CELL LYMPHOMA OF LYMPH NODES OF NECK (HCC): ICD-10-CM

## 2023-06-12 DIAGNOSIS — Z95.828 PORT-A-CATH IN PLACE: ICD-10-CM

## 2023-06-12 LAB
ALBUMIN SERPL BCP-MCNC: 4.2 G/DL (ref 3.2–4.9)
ALBUMIN/GLOB SERPL: 1.6 G/DL
ALP SERPL-CCNC: 92 U/L (ref 30–99)
ALT SERPL-CCNC: 26 U/L (ref 2–50)
ANION GAP SERPL CALC-SCNC: 9 MMOL/L (ref 7–16)
AST SERPL-CCNC: 29 U/L (ref 12–45)
BASOPHILS # BLD AUTO: 1 % (ref 0–1.8)
BASOPHILS # BLD: 0.05 K/UL (ref 0–0.12)
BILIRUB SERPL-MCNC: 0.7 MG/DL (ref 0.1–1.5)
BUN SERPL-MCNC: 10 MG/DL (ref 8–22)
CALCIUM ALBUM COR SERPL-MCNC: 8.6 MG/DL (ref 8.5–10.5)
CALCIUM SERPL-MCNC: 8.8 MG/DL (ref 8.5–10.5)
CHLORIDE SERPL-SCNC: 104 MMOL/L (ref 96–112)
CO2 SERPL-SCNC: 24 MMOL/L (ref 20–33)
CREAT SERPL-MCNC: 0.64 MG/DL (ref 0.5–1.4)
EOSINOPHIL # BLD AUTO: 0.3 K/UL (ref 0–0.51)
EOSINOPHIL NFR BLD: 6.1 % (ref 0–6.9)
ERYTHROCYTE [DISTWIDTH] IN BLOOD BY AUTOMATED COUNT: 39.6 FL (ref 35.9–50)
GFR SERPLBLD CREATININE-BSD FMLA CKD-EPI: 113 ML/MIN/1.73 M 2
GLOBULIN SER CALC-MCNC: 2.6 G/DL (ref 1.9–3.5)
GLUCOSE SERPL-MCNC: 102 MG/DL (ref 65–99)
HCT VFR BLD AUTO: 39.4 % (ref 42–52)
HGB BLD-MCNC: 13.6 G/DL (ref 14–18)
IMM GRANULOCYTES # BLD AUTO: 0 K/UL (ref 0–0.11)
IMM GRANULOCYTES NFR BLD AUTO: 0 % (ref 0–0.9)
LDH SERPL L TO P-CCNC: 183 U/L (ref 107–266)
LYMPHOCYTES # BLD AUTO: 1.92 K/UL (ref 1–4.8)
LYMPHOCYTES NFR BLD: 38.9 % (ref 22–41)
MCH RBC QN AUTO: 32.8 PG (ref 27–33)
MCHC RBC AUTO-ENTMCNC: 34.5 G/DL (ref 32.3–36.5)
MCV RBC AUTO: 94.9 FL (ref 81.4–97.8)
MONOCYTES # BLD AUTO: 0.35 K/UL (ref 0–0.85)
MONOCYTES NFR BLD AUTO: 7.1 % (ref 0–13.4)
NEUTROPHILS # BLD AUTO: 2.31 K/UL (ref 1.82–7.42)
NEUTROPHILS NFR BLD: 46.9 % (ref 44–72)
NRBC # BLD AUTO: 0 K/UL
NRBC BLD-RTO: 0 /100 WBC (ref 0–0.2)
PLATELET # BLD AUTO: 197 K/UL (ref 164–446)
PMV BLD AUTO: 8.6 FL (ref 9–12.9)
POTASSIUM SERPL-SCNC: 3.6 MMOL/L (ref 3.6–5.5)
PROT SERPL-MCNC: 6.8 G/DL (ref 6–8.2)
RBC # BLD AUTO: 4.15 M/UL (ref 4.7–6.1)
SODIUM SERPL-SCNC: 137 MMOL/L (ref 135–145)
WBC # BLD AUTO: 4.9 K/UL (ref 4.8–10.8)

## 2023-06-12 PROCEDURE — 83615 LACTATE (LD) (LDH) ENZYME: CPT

## 2023-06-12 PROCEDURE — 85025 COMPLETE CBC W/AUTO DIFF WBC: CPT

## 2023-06-12 PROCEDURE — 700111 HCHG RX REV CODE 636 W/ 250 OVERRIDE (IP): Performed by: NURSE PRACTITIONER

## 2023-06-12 PROCEDURE — 700101 HCHG RX REV CODE 250: Performed by: NURSE PRACTITIONER

## 2023-06-12 PROCEDURE — 36591 DRAW BLOOD OFF VENOUS DEVICE: CPT

## 2023-06-12 PROCEDURE — 80053 COMPREHEN METABOLIC PANEL: CPT

## 2023-06-12 PROCEDURE — A4212 NON CORING NEEDLE OR STYLET: HCPCS

## 2023-06-12 RX ADMIN — HEPARIN 500 UNITS: 100 SYRINGE at 15:45

## 2023-06-12 RX ADMIN — LIDOCAINE HYDROCHLORIDE 0.5 ML: 10 INJECTION, SOLUTION EPIDURAL; INFILTRATION; INTRACAUDAL; PERINEURAL at 15:45

## 2023-06-12 NOTE — PROGRESS NOTES
Earl came into infusion services today for port flush and labs. No complaints since last visit. Port accessed in sterile manner, labs drawn, flushed briskly, heparin locked, de-accessed, and covered with sterile gauze. Labs sent. Pt has next appointment. Pt discharged home to self care.

## 2023-07-11 ENCOUNTER — OUTPATIENT INFUSION SERVICES (OUTPATIENT)
Dept: ONCOLOGY | Facility: MEDICAL CENTER | Age: 53
End: 2023-07-11
Attending: STUDENT IN AN ORGANIZED HEALTH CARE EDUCATION/TRAINING PROGRAM
Payer: COMMERCIAL

## 2023-07-11 VITALS
HEART RATE: 80 BPM | RESPIRATION RATE: 18 BRPM | DIASTOLIC BLOOD PRESSURE: 69 MMHG | TEMPERATURE: 97.1 F | SYSTOLIC BLOOD PRESSURE: 118 MMHG | OXYGEN SATURATION: 94 %

## 2023-07-11 DIAGNOSIS — C83.31 DIFFUSE LARGE B-CELL LYMPHOMA OF LYMPH NODES OF NECK (HCC): ICD-10-CM

## 2023-07-11 DIAGNOSIS — Z95.828 PORT-A-CATH IN PLACE: ICD-10-CM

## 2023-07-11 PROCEDURE — 700101 HCHG RX REV CODE 250: Performed by: NURSE PRACTITIONER

## 2023-07-11 PROCEDURE — A4212 NON CORING NEEDLE OR STYLET: HCPCS

## 2023-07-11 PROCEDURE — 700111 HCHG RX REV CODE 636 W/ 250 OVERRIDE (IP): Performed by: NURSE PRACTITIONER

## 2023-07-11 PROCEDURE — 96523 IRRIG DRUG DELIVERY DEVICE: CPT

## 2023-07-11 RX ADMIN — LIDOCAINE HYDROCHLORIDE 0.5 ML: 10 INJECTION, SOLUTION EPIDURAL; INFILTRATION; INTRACAUDAL; PERINEURAL at 16:49

## 2023-07-11 RX ADMIN — HEPARIN 500 UNITS: 100 SYRINGE at 16:50

## 2023-07-12 NOTE — PROGRESS NOTES
Pt presents to infusion center for monthly port flush. Denies having any labs to draw. Port accessed by Macy MEZA in sterile manner, brisk blood return observed, flushed per policy, heparinized and de-accessed with needle intact, gauze dressing placed. Has next appt, discharged home to self care.

## 2023-08-08 ENCOUNTER — OUTPATIENT INFUSION SERVICES (OUTPATIENT)
Dept: ONCOLOGY | Facility: MEDICAL CENTER | Age: 53
End: 2023-08-08
Attending: STUDENT IN AN ORGANIZED HEALTH CARE EDUCATION/TRAINING PROGRAM
Payer: COMMERCIAL

## 2023-08-08 VITALS
TEMPERATURE: 96.8 F | RESPIRATION RATE: 16 BRPM | SYSTOLIC BLOOD PRESSURE: 127 MMHG | OXYGEN SATURATION: 95 % | DIASTOLIC BLOOD PRESSURE: 84 MMHG | HEART RATE: 78 BPM

## 2023-08-08 DIAGNOSIS — Z95.828 PORT-A-CATH IN PLACE: ICD-10-CM

## 2023-08-08 DIAGNOSIS — C83.31 DIFFUSE LARGE B-CELL LYMPHOMA OF LYMPH NODES OF NECK (HCC): ICD-10-CM

## 2023-08-08 PROCEDURE — 96523 IRRIG DRUG DELIVERY DEVICE: CPT

## 2023-08-08 PROCEDURE — A4212 NON CORING NEEDLE OR STYLET: HCPCS

## 2023-08-08 PROCEDURE — 700111 HCHG RX REV CODE 636 W/ 250 OVERRIDE (IP)

## 2023-08-08 PROCEDURE — 700111 HCHG RX REV CODE 636 W/ 250 OVERRIDE (IP): Performed by: NURSE PRACTITIONER

## 2023-08-08 RX ORDER — LIDOCAINE HYDROCHLORIDE 10 MG/ML
INJECTION, SOLUTION EPIDURAL; INFILTRATION; INTRACAUDAL; PERINEURAL
Status: COMPLETED
Start: 2023-08-08 | End: 2023-08-08

## 2023-08-08 RX ADMIN — LIDOCAINE HYDROCHLORIDE 0.5 ML: 10 INJECTION, SOLUTION EPIDURAL; INFILTRATION; INTRACAUDAL; PERINEURAL at 08:26

## 2023-08-08 RX ADMIN — HEPARIN 500 UNITS: 100 SYRINGE at 08:30

## 2023-08-08 RX ADMIN — Medication 0.5 ML: at 08:26

## 2023-08-08 NOTE — PROGRESS NOTES
Earl arrives to [facility] for monthly port flush.  Earl denies any new or acute changes.  Right chest port accessed in sterile fashion, brisk blood return observed.  Port flushed with NS, heparin instilled.  Port de-accessed, gauze and tape to site.  Discharged in NAD, next appointment confirmed.

## 2023-08-15 RX ORDER — 0.9 % SODIUM CHLORIDE 0.9 %
20 VIAL (ML) INJECTION PRN
Status: CANCELLED | OUTPATIENT
Start: 2023-09-05

## 2023-08-15 RX ORDER — HEPARIN SODIUM (PORCINE) LOCK FLUSH IV SOLN 100 UNIT/ML 100 UNIT/ML
500 SOLUTION INTRAVENOUS PRN
Status: CANCELLED | OUTPATIENT
Start: 2023-09-05

## 2023-09-05 ENCOUNTER — OUTPATIENT INFUSION SERVICES (OUTPATIENT)
Dept: ONCOLOGY | Facility: MEDICAL CENTER | Age: 53
End: 2023-09-05
Attending: STUDENT IN AN ORGANIZED HEALTH CARE EDUCATION/TRAINING PROGRAM
Payer: COMMERCIAL

## 2023-09-05 VITALS
TEMPERATURE: 97 F | DIASTOLIC BLOOD PRESSURE: 88 MMHG | HEART RATE: 78 BPM | OXYGEN SATURATION: 98 % | HEIGHT: 65 IN | RESPIRATION RATE: 18 BRPM | WEIGHT: 170.19 LBS | BODY MASS INDEX: 28.36 KG/M2 | SYSTOLIC BLOOD PRESSURE: 120 MMHG

## 2023-09-05 DIAGNOSIS — C83.31 DIFFUSE LARGE B-CELL LYMPHOMA OF LYMPH NODES OF NECK (HCC): ICD-10-CM

## 2023-09-05 DIAGNOSIS — Z95.828 PORT-A-CATH IN PLACE: ICD-10-CM

## 2023-09-05 LAB
ALBUMIN SERPL BCP-MCNC: 4.1 G/DL (ref 3.2–4.9)
ALBUMIN/GLOB SERPL: 1.5 G/DL
ALP SERPL-CCNC: 111 U/L (ref 30–99)
ALT SERPL-CCNC: 32 U/L (ref 2–50)
ANION GAP SERPL CALC-SCNC: 11 MMOL/L (ref 7–16)
AST SERPL-CCNC: 37 U/L (ref 12–45)
BASOPHILS # BLD AUTO: 0.9 % (ref 0–1.8)
BASOPHILS # BLD: 0.06 K/UL (ref 0–0.12)
BILIRUB SERPL-MCNC: 0.5 MG/DL (ref 0.1–1.5)
BUN SERPL-MCNC: 13 MG/DL (ref 8–22)
CALCIUM ALBUM COR SERPL-MCNC: 8.7 MG/DL (ref 8.5–10.5)
CALCIUM SERPL-MCNC: 8.8 MG/DL (ref 8.5–10.5)
CHLORIDE SERPL-SCNC: 103 MMOL/L (ref 96–112)
CO2 SERPL-SCNC: 23 MMOL/L (ref 20–33)
CREAT SERPL-MCNC: 0.73 MG/DL (ref 0.5–1.4)
EOSINOPHIL # BLD AUTO: 0.53 K/UL (ref 0–0.51)
EOSINOPHIL NFR BLD: 8.3 % (ref 0–6.9)
ERYTHROCYTE [DISTWIDTH] IN BLOOD BY AUTOMATED COUNT: 40.7 FL (ref 35.9–50)
GFR SERPLBLD CREATININE-BSD FMLA CKD-EPI: 108 ML/MIN/1.73 M 2
GLOBULIN SER CALC-MCNC: 2.8 G/DL (ref 1.9–3.5)
GLUCOSE SERPL-MCNC: 155 MG/DL (ref 65–99)
HCT VFR BLD AUTO: 41.9 % (ref 42–52)
HGB BLD-MCNC: 14 G/DL (ref 14–18)
IMM GRANULOCYTES # BLD AUTO: 0.01 K/UL (ref 0–0.11)
IMM GRANULOCYTES NFR BLD AUTO: 0.2 % (ref 0–0.9)
LDH SERPL L TO P-CCNC: 289 U/L (ref 107–266)
LYMPHOCYTES # BLD AUTO: 1.83 K/UL (ref 1–4.8)
LYMPHOCYTES NFR BLD: 28.5 % (ref 22–41)
MCH RBC QN AUTO: 32 PG (ref 27–33)
MCHC RBC AUTO-ENTMCNC: 33.4 G/DL (ref 32.3–36.5)
MCV RBC AUTO: 95.7 FL (ref 81.4–97.8)
MONOCYTES # BLD AUTO: 0.56 K/UL (ref 0–0.85)
MONOCYTES NFR BLD AUTO: 8.7 % (ref 0–13.4)
NEUTROPHILS # BLD AUTO: 3.43 K/UL (ref 1.82–7.42)
NEUTROPHILS NFR BLD: 53.4 % (ref 44–72)
NRBC # BLD AUTO: 0 K/UL
NRBC BLD-RTO: 0 /100 WBC (ref 0–0.2)
PLATELET # BLD AUTO: 243 K/UL (ref 164–446)
PMV BLD AUTO: 8.6 FL (ref 9–12.9)
POTASSIUM SERPL-SCNC: 3.8 MMOL/L (ref 3.6–5.5)
PROT SERPL-MCNC: 6.9 G/DL (ref 6–8.2)
RBC # BLD AUTO: 4.38 M/UL (ref 4.7–6.1)
SODIUM SERPL-SCNC: 137 MMOL/L (ref 135–145)
WBC # BLD AUTO: 6.4 K/UL (ref 4.8–10.8)

## 2023-09-05 PROCEDURE — 85025 COMPLETE CBC W/AUTO DIFF WBC: CPT

## 2023-09-05 PROCEDURE — A4212 NON CORING NEEDLE OR STYLET: HCPCS

## 2023-09-05 PROCEDURE — 96523 IRRIG DRUG DELIVERY DEVICE: CPT

## 2023-09-05 PROCEDURE — 83615 LACTATE (LD) (LDH) ENZYME: CPT

## 2023-09-05 PROCEDURE — 80053 COMPREHEN METABOLIC PANEL: CPT

## 2023-09-05 PROCEDURE — 700111 HCHG RX REV CODE 636 W/ 250 OVERRIDE (IP): Performed by: NURSE PRACTITIONER

## 2023-09-05 PROCEDURE — 700101 HCHG RX REV CODE 250: Performed by: NURSE PRACTITIONER

## 2023-09-05 RX ADMIN — HEPARIN 500 UNITS: 100 SYRINGE at 09:08

## 2023-09-05 RX ADMIN — LIDOCAINE HYDROCHLORIDE 0.5 ML: 10 INJECTION, SOLUTION EPIDURAL; INFILTRATION; INTRACAUDAL at 09:06

## 2023-09-05 ASSESSMENT — FIBROSIS 4 INDEX: FIB4 SCORE: 1.53

## 2023-09-05 NOTE — PROGRESS NOTES
Pt arrived ambulatory to [facility] for port flush/labs. POC discussed and pt verbalized understanding. Pt reports he has an appt with Dr. Kwan next week. Lidocaine injected intradermally per MAR, and port accessed per protocol; brisk blood return noted,  labs drawn (as noted for expected draw date 08/30) from open orders tab, and port flushed per policy. Port deaccessed with needle intact and site covered with sterile gauze/steri strips and pt tolerated well. No complications noted.  emailed per pt request to change next months appt and pt confirms he has MyChart access for appts. Pt discharged ambulatory in [facility].

## 2023-09-11 ENCOUNTER — APPOINTMENT (OUTPATIENT)
Dept: HEMATOLOGY ONCOLOGY | Facility: MEDICAL CENTER | Age: 53
End: 2023-09-11
Payer: COMMERCIAL

## 2023-09-15 ENCOUNTER — HOSPITAL ENCOUNTER (OUTPATIENT)
Dept: HEMATOLOGY ONCOLOGY | Facility: MEDICAL CENTER | Age: 53
End: 2023-09-15
Attending: STUDENT IN AN ORGANIZED HEALTH CARE EDUCATION/TRAINING PROGRAM
Payer: COMMERCIAL

## 2023-09-15 VITALS
HEIGHT: 65 IN | SYSTOLIC BLOOD PRESSURE: 118 MMHG | HEART RATE: 86 BPM | OXYGEN SATURATION: 96 % | BODY MASS INDEX: 28.85 KG/M2 | WEIGHT: 173.17 LBS | TEMPERATURE: 97.7 F | DIASTOLIC BLOOD PRESSURE: 86 MMHG

## 2023-09-15 DIAGNOSIS — C83.31 DIFFUSE LARGE B-CELL LYMPHOMA OF LYMPH NODES OF NECK (HCC): ICD-10-CM

## 2023-09-15 PROCEDURE — 99214 OFFICE O/P EST MOD 30 MIN: CPT | Performed by: STUDENT IN AN ORGANIZED HEALTH CARE EDUCATION/TRAINING PROGRAM

## 2023-09-15 ASSESSMENT — ENCOUNTER SYMPTOMS
PALPITATIONS: 0
FEVER: 0
SPUTUM PRODUCTION: 0
NAUSEA: 0
TREMORS: 0
DIZZINESS: 0
COUGH: 0
SHORTNESS OF BREATH: 0
SORE THROAT: 0
SENSORY CHANGE: 0
HEADACHES: 0
CHILLS: 0
WHEEZING: 0
NECK PAIN: 0
ORTHOPNEA: 0
MEMORY LOSS: 0
BRUISES/BLEEDS EASILY: 0
ABDOMINAL PAIN: 0
WEIGHT LOSS: 0
FOCAL WEAKNESS: 0
TINGLING: 0
DEPRESSION: 0
HEARTBURN: 0
VOMITING: 0
BLURRED VISION: 0

## 2023-09-15 ASSESSMENT — FIBROSIS 4 INDEX: FIB4 SCORE: 1.45

## 2023-09-15 NOTE — PROGRESS NOTES
Consult Note: Hematology/Oncology     Referring Provider: Peggy Kinsey  Primary Care:  Carlos Mcnamara M.D.    Chief Complaint   Patient presents with    Lymphoma     3 month follow up        Current Treatment:   10/27/22: C1D1 RCHOP  11/17/22: C2D1 R[facility]OP  12/08/22: C3D1 RCHOP  12/29/22: C4D1 RCHOP    Prior Treatment: None    Subjective:   History of Presenting Illness:  Azeem Mejia is a 53 y.o. male who presents today with a new diagnosis of diffuse large B-cell lymphoma.    Patient was originally seen by his PCP on August 17, 2022 with a submandibular lymphadenopathy.  He reported a 2-week history of lymphadenopathy.  Blood work at that time showed no abnormalities.  Patient underwent an ultrasound the same day which showed a 3.1 x 1.7 x 2.1 cm lymph node on the left submandibular region.  He was referred to Peggy on August 30, 2022 for submandibular lymphadenopathy work-up.    Patient underwent a CT with contrast which showed a 2.2 x 2.5 x 3.4 cm mass.  And he underwent a biopsy on September 7 with initial pathology showing a B-cell lymphoproliferative disorder with dim CD10 expression.  FISH analysis was negative for CCN D1 IGH, Bcl-2, BCL6, MYC.  Final path showed diffuse large B-cell lymphoma with germinal center B type.  C-Myc was negative.  Patient is not a double hit expresser.    He works in [employer].  He was a major in literature. 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     Interval History:    He reports doing well, except he is slightly tired because he comes straight from night shift today.    He is having his cousins come visit from [place] and he is happy to have visitors.     He is doing well, no unexpected weight loss.  In fact he has gained weight because he loves this new chicken and waffle place that opened in [place]. He denies any fever or night sweats.      The irritation he was feeling before has improved.     He got the flu shot earlier this month.  Has not had the Covid19    He is going to [place] next month      Past Medical History:   Diagnosis Date    Asthma     Cancer (HCC) 10/21/2022    left submandibular lymphoma large B cell    Dental disorder 10/21/2022    upper partial, lower left front times 1 cap    Diffuse large B-cell lymphoma of lymph nodes of neck (HCC)     Hyperlipidemia         Past Surgical History:   Procedure Laterality Date    CT DX BONE MARROW ASPIRATIONS Left 9/27/2022    Procedure: BONE MARROW CORE AND ASPIRATION;  Surgeon: Subhash Mathews M.D.;  Location: SURGERY SAME DAY [facility];  Service: Orthopedics    CT DX BONE MARROW BIOPSIES Left 9/27/2022    Procedure: BIOPSY, BONE MARROW, USING NEEDLE OR TROCAR - FLEMING;  Surgeon: Subhash Mathews M.D.;  Location: SURGERY SAME DAY [facility];  Service: Orthopedics    NASAL POLYPECTOMY Right        Social History     Tobacco Use    Smoking status: Never    Smokeless tobacco: Never   Vaping Use    Vaping Use: Never used   Substance Use Topics    Alcohol use: Yes     Comment: very rare    Drug use: Not Currently        Family History   Problem Relation Age of Onset    Diabetes Father     Asthma Sister     Hyperlipidemia Neg Hx     Hypertension Neg Hx     Cancer Neg Hx        Allergies   Allergen Reactions    Seasonal Runny Nose and Itching     Eyes and nose       Current Outpatient Medications   Medication Sig Dispense Refill    ALBUTEROL INH Inhale.      lidocaine-prilocaine (EMLA) 2.5-2.5 % Cream Apply to port 1 hour prior to access of port and cover with plastic wrap. 30 g 3    acetaminophen (TYLENOL) 500 MG Tab Take 500 mg by mouth every 6 hours as needed. Indications: Pain      predniSONE (DELTASONE) 50 MG Tab Take 1 Tablet by mouth every day. Take 2 tablets (100mg) on days 1-5 of chemotherapy (Patient not taking: Reported on 3/20/2023)       No current facility-administered medications for this  "encounter.       Review of Systems   Constitutional:  Positive for malaise/fatigue. Negative for chills, fever and weight loss.   HENT:  Negative for congestion, ear pain, nosebleeds and sore throat.    Eyes:  Negative for blurred vision.   Respiratory:  Negative for cough, sputum production, shortness of breath and wheezing.    Cardiovascular:  Negative for chest pain, palpitations, orthopnea and leg swelling.   Gastrointestinal:  Negative for abdominal pain, heartburn, nausea and vomiting.   Genitourinary:  Negative for dysuria, frequency and urgency.   Musculoskeletal:  Negative for neck pain.   Neurological:  Negative for dizziness, tingling, tremors, sensory change, focal weakness and headaches.   Endo/Heme/Allergies:  Does not bruise/bleed easily.   Psychiatric/Behavioral:  Negative for depression, memory loss and suicidal ideas.    All other systems reviewed and are negative.      Problem list, medications, and allergies reviewed by myself today in Epic.     Objective:     Vitals:    09/15/23 0759   BP: 118/86   BP Location: Left arm   Patient Position: Sitting   BP Cuff Size: Adult   Pulse: 86   Temp: 36.5 °C (97.7 °F)   TempSrc: Temporal   SpO2: 96%   Weight: 78.5 kg (173 lb 2.7 oz)   Height: 1.64 m (5' 4.57\")       DESC; KARNOFSKY SCALE WITH ECOG EQUIVALENT: 90, Able to carry on normal activity; minor signs or symptoms of disease (ECOG equivalent 0)    DISTRESS LEVEL: no apparent distress    Physical Exam  Constitutional:       General: He is not in acute distress.     Appearance: Normal appearance.      Comments: Port in place, no sign of infection   HENT:      Head: Normocephalic and atraumatic.      Nose: Nose normal. No congestion.      Mouth/Throat:      Mouth: Mucous membranes are moist.      Pharynx: Oropharynx is clear.   Eyes:      General: No scleral icterus.     Conjunctiva/sclera: Conjunctivae normal.      Pupils: Pupils are equal, round, and reactive to light.   Cardiovascular:      Rate and " Rhythm: Normal rate and regular rhythm.      Pulses: Normal pulses.      Heart sounds: No murmur heard.     No friction rub.   Pulmonary:      Effort: Pulmonary effort is normal. No respiratory distress.      Breath sounds: Normal breath sounds. No stridor. No wheezing or rales.   Chest:      Chest wall: No tenderness.   Abdominal:      General: Abdomen is flat. Bowel sounds are normal. There is no distension.      Palpations: Abdomen is soft. There is no mass.      Tenderness: There is no abdominal tenderness. There is no guarding or rebound.   Musculoskeletal:         General: No swelling, tenderness or deformity. Normal range of motion.      Cervical back: Normal range of motion and neck supple. No rigidity or tenderness.      Right lower leg: No edema.      Left lower leg: No edema.   Skin:     General: Skin is warm.      Coloration: Skin is not jaundiced or pale.      Findings: No bruising or rash.   Neurological:      General: No focal deficit present.      Mental Status: He is alert and oriented to person, place, and time. Mental status is at baseline.      Motor: No weakness.   Psychiatric:         Mood and Affect: Mood normal.         Behavior: Behavior normal.         Thought Content: Thought content normal.         Judgment: Judgment normal.         Labs:   Most recent labs reviewed.  CBC and CMP  grossly normal.  HIV negative hepatitis B negative LDH normal uric acid normal     Imaging:   Most recent images below have been independently reviewed by me.      CT CAP  IMPRESSION:     1.  No lymphadenopathy in the chest, abdomen or pelvis. No splenomegaly.  2.  Stable 5 mm subpleural nodule in the right lung apex. No new pulmonary nodules or masses.    ECHO  Normal left ventricular size, thickness, and systolic function.  The left ventricular ejection fraction is visually estimated to be 55%.  Mild hypokinesis basal posterior wall otherwise normal regional wall   motion.  Normal right ventricular size and  systolic function.  No prior study is available for comparison    PET SCAN    1. Interval decrease in size of the left submandibular lymph node mass, measuring 1.3 x 1.9 cm, previously 2.5 x 3.5 cm. The mass demonstrates mild increased uptake (SUV max 5.6 )     2. Additional uptake in the nonenlarged left level 2/3 lymph nodes (SUV max 6.7) and right level 2 lymph nodes (SUV max 5.3) could relate to lymphoma as well      PET scan 12/27/22     1. Complete response to therapy with resolution of prior left submandibular lymph node mass. Deauville score is 1.     2. No increased uptake in the bilateral neck. No enlarged lymph nodes in the neck.     Pathology:  FINAL DIAGNOSIS:     A. Left submandibular node core biopsy:          Diffuse large B cell lymphoma, GCB type.          C-MYC IHC is negative which shows this is not a double           expressor.     Synoptic Summary for Non-Hodgkin Lymphoma/Lymphoid Neoplasms:          -Specimen: Lymph node        -Procedure: Core biopsy        -Tumor site: Left submandibular        -Histologic type: Diffuse large B cell lymphoma, GCB        -Immunophenotyping:            Flow cytometry: Performed; please refer to microscopic             description for details.            Immunohistochemistry: Performed; please refer to microscopic             description for details.        -Cytogenetic Studies: Not performed.        -Molecular Genetic Studies: FISH is negative for Bcl-2, Bcl-6,         C-MYC and t(11;14)- negative for double/triple hit.            Comment: One area of the core shows lower grade cells that have           a lower Ki-67 of   10% and may either represent either only a           partially involved node or an area of lower grade lymphoma such           as follicular lymphoma.  If this distinction is clinically           important than an excision of the whole node is necessary for           evaluation.  This case has been reviewed by a second           pathologist,  Dr. Villanueva, who concurs with the diagnosis.       Bone Marrow Biopsy  FINAL DIAGNOSIS:     Peripheral blood smear and CBC:          Normal white blood cell count demonstrating a mild relative           monocytosis.   Bone marrow aspirate, clot, and core biopsy:          -Normal cellular bone marrow demonstrating:          -There is no morphologic, immunohistochemical or flow cytometric           evidence for bone marrow involvement by lymphoma.          -Trilineage hematopoiesis with maturation.          -No morphologic increase in blast cells.          -Focal dysmegakaryocytopoiesis (<10%).          -Mild increased polyclonal plasma cells.          -Adequate stainable bone marrow iron stores.          See comment.         Assessment/Plan:      Cancer Staging   Diffuse large B-cell lymphoma of lymph nodes of neck (HCC)  Staging form: Hodgkin and Non-Hodgkin Lymphoma, AJCC 8th Edition  - Clinical stage from 10/4/2022: Stage IIE (Diffuse large B-cell lymphoma) - Signed by Henna Kwna M.D. on 10/4/2022         Mr. Mejia is a 54 yo M who presents today 3 months after RCHOP treatment for DLBCL. He is now in surveillance.     Most recent scan shows no LAD. He is doing well.     Plan:  -HP in 3 months (11/30/23)  -CT CAP in 6 months (11/30/23)  -patient to get labs CBC, CMP, LDH   -patient has PORT in place, will need it flushed.  Can consider removing at that time.     Surveillance will consist of:  -HP and labs every 3-6 months for 5 years and then yearly or as clinically indicated  Managing will consist of a CT chest abdomen pelvis with contrast no more often than every 6 months for 2 years    Any questions and concerns raised by the patient were addressed and answered. Patient denies any further questions.  Patient encouraged to call the office with any concerns or issues.     Henna Kwan M.D.  Hematology/Oncology    30 minutes spent on this case

## 2023-10-02 RX ORDER — 0.9 % SODIUM CHLORIDE 0.9 %
20 VIAL (ML) INJECTION PRN
Status: CANCELLED | OUTPATIENT
Start: 2023-10-11

## 2023-10-02 RX ORDER — HEPARIN SODIUM (PORCINE) LOCK FLUSH IV SOLN 100 UNIT/ML 100 UNIT/ML
500 SOLUTION INTRAVENOUS PRN
Status: CANCELLED | OUTPATIENT
Start: 2023-10-11

## 2023-10-11 ENCOUNTER — OUTPATIENT INFUSION SERVICES (OUTPATIENT)
Dept: ONCOLOGY | Facility: MEDICAL CENTER | Age: 53
End: 2023-10-11
Attending: PSYCHIATRY & NEUROLOGY
Payer: COMMERCIAL

## 2023-10-11 VITALS
HEART RATE: 89 BPM | DIASTOLIC BLOOD PRESSURE: 76 MMHG | RESPIRATION RATE: 18 BRPM | SYSTOLIC BLOOD PRESSURE: 116 MMHG | OXYGEN SATURATION: 97 % | TEMPERATURE: 98 F

## 2023-10-11 DIAGNOSIS — C83.31 DIFFUSE LARGE B-CELL LYMPHOMA OF LYMPH NODES OF NECK (HCC): ICD-10-CM

## 2023-10-11 DIAGNOSIS — Z95.828 PORT-A-CATH IN PLACE: ICD-10-CM

## 2023-10-11 PROCEDURE — 96523 IRRIG DRUG DELIVERY DEVICE: CPT

## 2023-10-11 PROCEDURE — 700111 HCHG RX REV CODE 636 W/ 250 OVERRIDE (IP): Performed by: NURSE PRACTITIONER

## 2023-10-11 PROCEDURE — 700101 HCHG RX REV CODE 250: Performed by: NURSE PRACTITIONER

## 2023-10-11 RX ADMIN — HEPARIN 500 UNITS: 100 SYRINGE at 08:12

## 2023-10-11 RX ADMIN — LIDOCAINE HYDROCHLORIDE 0.5 ML: 10 INJECTION, SOLUTION EPIDURAL; INFILTRATION; INTRACAUDAL at 08:12

## 2023-10-11 NOTE — PROGRESS NOTES
Earl arrived ambulatory to the infusion center for monthly port flush. Denies having any labs to draw, verified by expected date of 12/15/23 in open orders. Port accessed in sterile manner, brisk blood return observed, flushed per policy, heparinized and de-accessed with needle intact, gauze dressing placed. Next appointment confirmed, he was then discharged home in stable condition.

## 2023-10-23 RX ORDER — 0.9 % SODIUM CHLORIDE 0.9 %
20 VIAL (ML) INJECTION PRN
Status: CANCELLED | OUTPATIENT
Start: 2023-11-08

## 2023-10-23 RX ORDER — HEPARIN SODIUM (PORCINE) LOCK FLUSH IV SOLN 100 UNIT/ML 100 UNIT/ML
500 SOLUTION INTRAVENOUS PRN
Status: CANCELLED | OUTPATIENT
Start: 2023-11-08

## 2023-11-08 ENCOUNTER — OUTPATIENT INFUSION SERVICES (OUTPATIENT)
Dept: ONCOLOGY | Facility: MEDICAL CENTER | Age: 53
End: 2023-11-08
Attending: PSYCHIATRY & NEUROLOGY
Payer: COMMERCIAL

## 2023-11-08 VITALS
TEMPERATURE: 96.8 F | HEART RATE: 80 BPM | SYSTOLIC BLOOD PRESSURE: 130 MMHG | RESPIRATION RATE: 18 BRPM | DIASTOLIC BLOOD PRESSURE: 80 MMHG | OXYGEN SATURATION: 98 %

## 2023-11-08 DIAGNOSIS — C83.31 DIFFUSE LARGE B-CELL LYMPHOMA OF LYMPH NODES OF NECK (HCC): ICD-10-CM

## 2023-11-08 DIAGNOSIS — Z95.828 PORT-A-CATH IN PLACE: ICD-10-CM

## 2023-11-08 LAB
ALBUMIN SERPL BCP-MCNC: 4.2 G/DL (ref 3.2–4.9)
ALBUMIN/GLOB SERPL: 1.4 G/DL
ALP SERPL-CCNC: 97 U/L (ref 30–99)
ALT SERPL-CCNC: 37 U/L (ref 2–50)
ANION GAP SERPL CALC-SCNC: 10 MMOL/L (ref 7–16)
AST SERPL-CCNC: 35 U/L (ref 12–45)
BASOPHILS # BLD AUTO: 0.5 % (ref 0–1.8)
BASOPHILS # BLD: 0.04 K/UL (ref 0–0.12)
BILIRUB SERPL-MCNC: 0.3 MG/DL (ref 0.1–1.5)
BUN SERPL-MCNC: 11 MG/DL (ref 8–22)
CALCIUM ALBUM COR SERPL-MCNC: 8.2 MG/DL (ref 8.5–10.5)
CALCIUM SERPL-MCNC: 8.4 MG/DL (ref 8.5–10.5)
CHLORIDE SERPL-SCNC: 103 MMOL/L (ref 96–112)
CO2 SERPL-SCNC: 23 MMOL/L (ref 20–33)
CREAT SERPL-MCNC: 0.57 MG/DL (ref 0.5–1.4)
EOSINOPHIL # BLD AUTO: 0.48 K/UL (ref 0–0.51)
EOSINOPHIL NFR BLD: 6.5 % (ref 0–6.9)
ERYTHROCYTE [DISTWIDTH] IN BLOOD BY AUTOMATED COUNT: 39.5 FL (ref 35.9–50)
GFR SERPLBLD CREATININE-BSD FMLA CKD-EPI: 116 ML/MIN/1.73 M 2
GLOBULIN SER CALC-MCNC: 3.1 G/DL (ref 1.9–3.5)
GLUCOSE SERPL-MCNC: 115 MG/DL (ref 65–99)
HCT VFR BLD AUTO: 42.1 % (ref 42–52)
HGB BLD-MCNC: 14.4 G/DL (ref 14–18)
IMM GRANULOCYTES # BLD AUTO: 0.01 K/UL (ref 0–0.11)
IMM GRANULOCYTES NFR BLD AUTO: 0.1 % (ref 0–0.9)
LDH SERPL L TO P-CCNC: 321 U/L (ref 107–266)
LYMPHOCYTES # BLD AUTO: 2.49 K/UL (ref 1–4.8)
LYMPHOCYTES NFR BLD: 33.5 % (ref 22–41)
MCH RBC QN AUTO: 32.8 PG (ref 27–33)
MCHC RBC AUTO-ENTMCNC: 34.2 G/DL (ref 32.3–36.5)
MCV RBC AUTO: 95.9 FL (ref 81.4–97.8)
MONOCYTES # BLD AUTO: 0.57 K/UL (ref 0–0.85)
MONOCYTES NFR BLD AUTO: 7.7 % (ref 0–13.4)
NEUTROPHILS # BLD AUTO: 3.85 K/UL (ref 1.82–7.42)
NEUTROPHILS NFR BLD: 51.7 % (ref 44–72)
NRBC # BLD AUTO: 0 K/UL
NRBC BLD-RTO: 0 /100 WBC (ref 0–0.2)
OUTPT INFUS CBC COMMENT OICOM: ABNORMAL
PLATELET # BLD AUTO: 255 K/UL (ref 164–446)
PMV BLD AUTO: 8.5 FL (ref 9–12.9)
POTASSIUM SERPL-SCNC: 4.1 MMOL/L (ref 3.6–5.5)
PROT SERPL-MCNC: 7.3 G/DL (ref 6–8.2)
RBC # BLD AUTO: 4.39 M/UL (ref 4.7–6.1)
SODIUM SERPL-SCNC: 136 MMOL/L (ref 135–145)
WBC # BLD AUTO: 7.4 K/UL (ref 4.8–10.8)

## 2023-11-08 PROCEDURE — 80053 COMPREHEN METABOLIC PANEL: CPT

## 2023-11-08 PROCEDURE — 85025 COMPLETE CBC W/AUTO DIFF WBC: CPT

## 2023-11-08 PROCEDURE — 700111 HCHG RX REV CODE 636 W/ 250 OVERRIDE (IP)

## 2023-11-08 PROCEDURE — 700101 HCHG RX REV CODE 250: Performed by: NURSE PRACTITIONER

## 2023-11-08 PROCEDURE — A4212 NON CORING NEEDLE OR STYLET: HCPCS

## 2023-11-08 PROCEDURE — 36591 DRAW BLOOD OFF VENOUS DEVICE: CPT

## 2023-11-08 PROCEDURE — 83615 LACTATE (LD) (LDH) ENZYME: CPT

## 2023-11-08 RX ADMIN — LIDOCAINE HYDROCHLORIDE 0.5 ML: 10 INJECTION, SOLUTION EPIDURAL; INFILTRATION; INTRACAUDAL at 08:41

## 2023-11-08 RX ADMIN — HEPARIN 500 UNITS: 100 SYRINGE at 08:41

## 2023-11-08 NOTE — PROGRESS NOTES
Patient to [facility] for PORT flush with labs. Lidocaine injected over port. PORT accessed with sterile field, flushed with + blood return, labs drawn. SASH performed and port de-accessed. Gauze and paper tape applied as a dressing. Patient to home in care of self.

## 2023-11-09 DIAGNOSIS — Z79.899 ENCOUNTER FOR LONG-TERM (CURRENT) USE OF HIGH-RISK MEDICATION: ICD-10-CM

## 2023-11-28 ENCOUNTER — HOSPITAL ENCOUNTER (OUTPATIENT)
Dept: RADIOLOGY | Facility: MEDICAL CENTER | Age: 53
End: 2023-11-28
Attending: STUDENT IN AN ORGANIZED HEALTH CARE EDUCATION/TRAINING PROGRAM
Payer: COMMERCIAL

## 2023-11-28 DIAGNOSIS — C83.31 DIFFUSE LARGE B-CELL LYMPHOMA OF LYMPH NODES OF NECK (HCC): ICD-10-CM

## 2023-11-28 PROCEDURE — 700117 HCHG RX CONTRAST REV CODE 255: Performed by: STUDENT IN AN ORGANIZED HEALTH CARE EDUCATION/TRAINING PROGRAM

## 2023-11-28 PROCEDURE — 71260 CT THORAX DX C+: CPT

## 2023-11-28 RX ADMIN — IOHEXOL 100 ML: 350 INJECTION, SOLUTION INTRAVENOUS at 09:44

## 2023-12-04 RX ORDER — 0.9 % SODIUM CHLORIDE 0.9 %
20 VIAL (ML) INJECTION PRN
Status: CANCELLED | OUTPATIENT
Start: 2023-12-13

## 2023-12-04 RX ORDER — HEPARIN SODIUM (PORCINE) LOCK FLUSH IV SOLN 100 UNIT/ML 100 UNIT/ML
500 SOLUTION INTRAVENOUS PRN
Status: CANCELLED | OUTPATIENT
Start: 2023-12-13

## 2023-12-08 ENCOUNTER — APPOINTMENT (OUTPATIENT)
Dept: HEMATOLOGY ONCOLOGY | Facility: MEDICAL CENTER | Age: 53
End: 2023-12-08
Payer: COMMERCIAL

## 2023-12-13 ENCOUNTER — OUTPATIENT INFUSION SERVICES (OUTPATIENT)
Dept: ONCOLOGY | Facility: MEDICAL CENTER | Age: 53
End: 2023-12-13
Attending: PSYCHIATRY & NEUROLOGY
Payer: COMMERCIAL

## 2023-12-13 VITALS
HEART RATE: 88 BPM | DIASTOLIC BLOOD PRESSURE: 76 MMHG | OXYGEN SATURATION: 93 % | TEMPERATURE: 97 F | RESPIRATION RATE: 18 BRPM | SYSTOLIC BLOOD PRESSURE: 106 MMHG

## 2023-12-13 DIAGNOSIS — Z95.828 PORT-A-CATH IN PLACE: ICD-10-CM

## 2023-12-13 DIAGNOSIS — C83.31 DIFFUSE LARGE B-CELL LYMPHOMA OF LYMPH NODES OF NECK (HCC): ICD-10-CM

## 2023-12-13 LAB
ALBUMIN SERPL BCP-MCNC: 4.1 G/DL (ref 3.2–4.9)
ALBUMIN/GLOB SERPL: 1.1 G/DL
ALP SERPL-CCNC: 103 U/L (ref 30–99)
ALT SERPL-CCNC: 32 U/L (ref 2–50)
ANION GAP SERPL CALC-SCNC: 10 MMOL/L (ref 7–16)
AST SERPL-CCNC: 26 U/L (ref 12–45)
BASOPHILS # BLD AUTO: 0.3 % (ref 0–1.8)
BASOPHILS # BLD: 0.03 K/UL (ref 0–0.12)
BILIRUB SERPL-MCNC: 0.7 MG/DL (ref 0.1–1.5)
BUN SERPL-MCNC: 9 MG/DL (ref 8–22)
CALCIUM ALBUM COR SERPL-MCNC: 8.7 MG/DL (ref 8.5–10.5)
CALCIUM SERPL-MCNC: 8.8 MG/DL (ref 8.5–10.5)
CHLORIDE SERPL-SCNC: 99 MMOL/L (ref 96–112)
CO2 SERPL-SCNC: 24 MMOL/L (ref 20–33)
CREAT SERPL-MCNC: 0.63 MG/DL (ref 0.5–1.4)
EOSINOPHIL # BLD AUTO: 0.26 K/UL (ref 0–0.51)
EOSINOPHIL NFR BLD: 2.9 % (ref 0–6.9)
ERYTHROCYTE [DISTWIDTH] IN BLOOD BY AUTOMATED COUNT: 37.6 FL (ref 35.9–50)
GFR SERPLBLD CREATININE-BSD FMLA CKD-EPI: 113 ML/MIN/1.73 M 2
GLOBULIN SER CALC-MCNC: 3.6 G/DL (ref 1.9–3.5)
GLUCOSE SERPL-MCNC: 93 MG/DL (ref 65–99)
HCT VFR BLD AUTO: 40.2 % (ref 42–52)
HGB BLD-MCNC: 14 G/DL (ref 14–18)
IMM GRANULOCYTES # BLD AUTO: 0.09 K/UL (ref 0–0.11)
IMM GRANULOCYTES NFR BLD AUTO: 1 % (ref 0–0.9)
LDH SERPL L TO P-CCNC: 205 U/L (ref 107–266)
LYMPHOCYTES # BLD AUTO: 2.65 K/UL (ref 1–4.8)
LYMPHOCYTES NFR BLD: 29.5 % (ref 22–41)
MCH RBC QN AUTO: 32.2 PG (ref 27–33)
MCHC RBC AUTO-ENTMCNC: 34.8 G/DL (ref 32.3–36.5)
MCV RBC AUTO: 92.4 FL (ref 81.4–97.8)
MONOCYTES # BLD AUTO: 1.08 K/UL (ref 0–0.85)
MONOCYTES NFR BLD AUTO: 12 % (ref 0–13.4)
NEUTROPHILS # BLD AUTO: 4.86 K/UL (ref 1.82–7.42)
NEUTROPHILS NFR BLD: 54.3 % (ref 44–72)
NRBC # BLD AUTO: 0 K/UL
NRBC BLD-RTO: 0 /100 WBC (ref 0–0.2)
PLATELET # BLD AUTO: 340 K/UL (ref 164–446)
PMV BLD AUTO: 8.6 FL (ref 9–12.9)
POTASSIUM SERPL-SCNC: 3.9 MMOL/L (ref 3.6–5.5)
PROT SERPL-MCNC: 7.7 G/DL (ref 6–8.2)
RBC # BLD AUTO: 4.35 M/UL (ref 4.7–6.1)
SODIUM SERPL-SCNC: 133 MMOL/L (ref 135–145)
WBC # BLD AUTO: 9 K/UL (ref 4.8–10.8)

## 2023-12-13 PROCEDURE — 80053 COMPREHEN METABOLIC PANEL: CPT

## 2023-12-13 PROCEDURE — 85025 COMPLETE CBC W/AUTO DIFF WBC: CPT

## 2023-12-13 PROCEDURE — 700101 HCHG RX REV CODE 250: Performed by: NURSE PRACTITIONER

## 2023-12-13 PROCEDURE — A4212 NON CORING NEEDLE OR STYLET: HCPCS

## 2023-12-13 PROCEDURE — 83615 LACTATE (LD) (LDH) ENZYME: CPT

## 2023-12-13 PROCEDURE — 700111 HCHG RX REV CODE 636 W/ 250 OVERRIDE (IP): Performed by: NURSE PRACTITIONER

## 2023-12-13 PROCEDURE — 36591 DRAW BLOOD OFF VENOUS DEVICE: CPT

## 2023-12-13 RX ADMIN — LIDOCAINE HYDROCHLORIDE 0.5 ML: 10 INJECTION, SOLUTION EPIDURAL; INFILTRATION; INTRACAUDAL; PERINEURAL at 16:27

## 2023-12-13 RX ADMIN — HEPARIN 500 UNITS: 100 SYRINGE at 16:31

## 2023-12-14 NOTE — PROGRESS NOTES
Pt arrived ambulatory for port flush and lab draw.  Pt denies c/o, has f/u appt 12/18 with MD and will discuss port removal at that time.  Pt requested intradermal lidocaine prior to port insertion, accessed per protocol with good blood return.  Labs drawn per orders, flushed and deaccessed.  Pt Dc'd home without incident, will f/u with MD as scheduled to discuss port removal.  If port isn't being removed, pt will call for further port flush appts.

## 2023-12-18 ENCOUNTER — HOSPITAL ENCOUNTER (OUTPATIENT)
Dept: HEMATOLOGY ONCOLOGY | Facility: MEDICAL CENTER | Age: 53
End: 2023-12-18
Attending: STUDENT IN AN ORGANIZED HEALTH CARE EDUCATION/TRAINING PROGRAM
Payer: COMMERCIAL

## 2023-12-18 VITALS
TEMPERATURE: 97.5 F | HEART RATE: 81 BPM | SYSTOLIC BLOOD PRESSURE: 118 MMHG | DIASTOLIC BLOOD PRESSURE: 64 MMHG | BODY MASS INDEX: 27.77 KG/M2 | OXYGEN SATURATION: 95 % | WEIGHT: 166.67 LBS | HEIGHT: 65 IN

## 2023-12-18 DIAGNOSIS — C83.31 DIFFUSE LARGE B-CELL LYMPHOMA OF LYMPH NODES OF NECK (HCC): ICD-10-CM

## 2023-12-18 PROCEDURE — 99214 OFFICE O/P EST MOD 30 MIN: CPT | Performed by: STUDENT IN AN ORGANIZED HEALTH CARE EDUCATION/TRAINING PROGRAM

## 2023-12-18 PROCEDURE — 99212 OFFICE O/P EST SF 10 MIN: CPT | Performed by: STUDENT IN AN ORGANIZED HEALTH CARE EDUCATION/TRAINING PROGRAM

## 2023-12-18 ASSESSMENT — ENCOUNTER SYMPTOMS
DEPRESSION: 0
ORTHOPNEA: 0
WEIGHT LOSS: 0
SENSORY CHANGE: 0
MEMORY LOSS: 0
DIZZINESS: 0
SPUTUM PRODUCTION: 0
PALPITATIONS: 0
NAUSEA: 0
CHILLS: 0
HEADACHES: 0
NECK PAIN: 0
SHORTNESS OF BREATH: 0
BRUISES/BLEEDS EASILY: 0
BLURRED VISION: 0
SORE THROAT: 0
VOMITING: 0
FOCAL WEAKNESS: 0
COUGH: 0
HEARTBURN: 0
WHEEZING: 0
FEVER: 0
TINGLING: 0
ABDOMINAL PAIN: 0
TREMORS: 0

## 2023-12-18 ASSESSMENT — FIBROSIS 4 INDEX: FIB4 SCORE: 0.73

## 2023-12-18 NOTE — PROGRESS NOTES
Consult Note: Hematology/Oncology     Referring Provider: Peggy Kinsey  Primary Care:  Carlos Mcnamara M.D.    Chief Complaint   Patient presents with    Lymphoma     Three month follow up        Current Treatment:   10/27/22: C1D1 RCHOP  11/17/22: C2D1 RCHOP  12/08/22: C3D1 RCHOP  12/29/22: C4D1 RCHOP    Prior Treatment: None    Subjective:   History of Presenting Illness:  Azeem Mejia is a 53 y.o. male who presents today with a new diagnosis of diffuse large B-cell lymphoma.    Patient was originally seen by his PCP on August 17, 2022 with a submandibular lymphadenopathy.  He reported a 2-week history of lymphadenopathy.  Blood work at that time showed no abnormalities.  Patient underwent an ultrasound the same day which showed a 3.1 x 1.7 x 2.1 cm lymph node on the left submandibular region.  He was referred to Peggy on August 30, 2022 for submandibular lymphadenopathy work-up.    Patient underwent a CT with contrast which showed a 2.2 x 2.5 x 3.4 cm mass.  And he underwent a biopsy on September 7 with initial pathology showing a B-cell lymphoproliferative disorder with dim CD10 expression.  FISH analysis was negative for CCN D1 IGH, Bcl-2, BCL6, MYC.  Final path showed diffuse large B-cell lymphoma with germinal center B type.  C-Myc was negative.  Patient is not a double hit expresser.    He works in [employer].  He was a major in literature. 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     Interval History:      Patient had a great vacation in [place].  He noticed that his energy was good!  He hiked and did everything he wanted to go.     He is doing well, no unexpected weight loss.      He noticed that he has a cough, but he has been sick from a wedding.  He did a COVID19 12/3 and 12/6.  Both tests were negative.     Past Medical History:   Diagnosis Date    Asthma     Cancer  (HCC) 10/21/2022    left submandibular lymphoma large B cell    Dental disorder 10/21/2022    upper partial, lower left front times 1 cap    Diffuse large B-cell lymphoma of lymph nodes of neck (HCC)     Hyperlipidemia         Past Surgical History:   Procedure Laterality Date    MN DX BONE MARROW ASPIRATIONS Left 9/27/2022    Procedure: BONE MARROW CORE AND ASPIRATION;  Surgeon: Subhash Mathews M.D.;  Location: SURGERY SAME DAY [facility];  Service: Orthopedics    MN DX BONE MARROW BIOPSIES Left 9/27/2022    Procedure: BIOPSY, BONE MARROW, USING NEEDLE OR TROCAR - FLEMING;  Surgeon: Subhash Mathews M.D.;  Location: SURGERY SAME DAY [facility];  Service: Orthopedics    NASAL POLYPECTOMY Right        Social History     Tobacco Use    Smoking status: Never    Smokeless tobacco: Never   Vaping Use    Vaping Use: Never used   Substance Use Topics    Alcohol use: Yes     Comment: very rare    Drug use: Not Currently        Family History   Problem Relation Age of Onset    Diabetes Father     Asthma Sister     Hyperlipidemia Neg Hx     Hypertension Neg Hx     Cancer Neg Hx        Allergies   Allergen Reactions    Seasonal Runny Nose and Itching     Eyes and nose       Current Outpatient Medications   Medication Sig Dispense Refill    ALBUTEROL INH Inhale.      lidocaine-prilocaine (EMLA) 2.5-2.5 % Cream Apply to port 1 hour prior to access of port and cover with plastic wrap. 30 g 3    acetaminophen (TYLENOL) 500 MG Tab Take 500 mg by mouth every 6 hours as needed. Indications: Pain      predniSONE (DELTASONE) 50 MG Tab Take 1 Tablet by mouth every day. Take 2 tablets (100mg) on days 1-5 of chemotherapy (Patient not taking: Reported on 3/20/2023)       No current facility-administered medications for this encounter.       Review of Systems   Constitutional:  Positive for malaise/fatigue. Negative for chills, fever and weight loss.   HENT:  Negative for congestion, ear pain, nosebleeds and sore throat.    Eyes:  Negative for blurred  "vision.   Respiratory:  Negative for cough, sputum production, shortness of breath and wheezing.    Cardiovascular:  Negative for chest pain, palpitations, orthopnea and leg swelling.   Gastrointestinal:  Negative for abdominal pain, heartburn, nausea and vomiting.   Genitourinary:  Negative for dysuria, frequency and urgency.   Musculoskeletal:  Negative for neck pain.   Neurological:  Negative for dizziness, tingling, tremors, sensory change, focal weakness and headaches.   Endo/Heme/Allergies:  Does not bruise/bleed easily.   Psychiatric/Behavioral:  Negative for depression, memory loss and suicidal ideas.    All other systems reviewed and are negative.      Problem list, medications, and allergies reviewed by myself today in Epic.     Objective:     Vitals:    12/18/23 1430   BP: 118/64   BP Location: Right arm   Patient Position: Sitting   BP Cuff Size: Adult   Pulse: 81   Temp: 36.4 °C (97.5 °F)   TempSrc: Temporal   SpO2: 95%   Weight: 75.6 kg (166 lb 10.7 oz)   Height: 1.64 m (5' 4.57\")       DESC; KARNOFSKY SCALE WITH ECOG EQUIVALENT: 90, Able to carry on normal activity; minor signs or symptoms of disease (ECOG equivalent 0)    DISTRESS LEVEL: no apparent distress    Physical Exam  Constitutional:       General: He is not in acute distress.     Appearance: Normal appearance.      Comments: Port in place, no sign of infection   HENT:      Head: Normocephalic and atraumatic.      Nose: Nose normal. No congestion.      Mouth/Throat:      Mouth: Mucous membranes are moist.      Pharynx: Oropharynx is clear.   Eyes:      General: No scleral icterus.     Conjunctiva/sclera: Conjunctivae normal.      Pupils: Pupils are equal, round, and reactive to light.   Cardiovascular:      Rate and Rhythm: Normal rate and regular rhythm.      Pulses: Normal pulses.      Heart sounds: No murmur heard.     No friction rub.   Pulmonary:      Effort: Pulmonary effort is normal. No respiratory distress.      Breath sounds: Normal " breath sounds. No stridor. No wheezing or rales.   Chest:      Chest wall: No tenderness.   Abdominal:      General: Abdomen is flat. Bowel sounds are normal. There is no distension.      Palpations: Abdomen is soft. There is no mass.      Tenderness: There is no abdominal tenderness. There is no guarding or rebound.   Musculoskeletal:         General: No swelling, tenderness or deformity. Normal range of motion.      Cervical back: Normal range of motion and neck supple. No rigidity or tenderness.      Right lower leg: No edema.      Left lower leg: No edema.   Skin:     General: Skin is warm.      Coloration: Skin is not jaundiced or pale.      Findings: No bruising or rash.   Neurological:      General: No focal deficit present.      Mental Status: He is alert and oriented to person, place, and time. Mental status is at baseline.      Motor: No weakness.   Psychiatric:         Mood and Affect: Mood normal.         Behavior: Behavior normal.         Thought Content: Thought content normal.         Judgment: Judgment normal.         Labs:   Most recent labs reviewed.  CBC and CMP  grossly normal.  HIV negative hepatitis B negative LDH normal uric acid normal     Imaging:   Most recent images below have been independently reviewed by me.      11/29/23 CT CAP  1. No evidence of disease recurrence.  2. Stable likely benign 5 mm right upper lobe pulmonary nodule.  3. 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    CT CAP  IMPRESSION:     1.  No lymphadenopathy in the chest, abdomen or pelvis. No splenomegaly.  2.  Stable 5 mm subpleural nodule in the right lung apex. No new pulmonary nodules or masses.    ECHO  Normal left ventricular size, thickness, and systolic function.  The left ventricular ejection fraction is visually estimated to be 55%.  Mild hypokinesis basal posterior wall otherwise normal regional wall   motion.  Normal right ventricular size and systolic function.  No prior study is available for comparison    PET SCAN    1.  Interval decrease in size of the left submandibular lymph node mass, measuring 1.3 x 1.9 cm, previously 2.5 x 3.5 cm. The mass demonstrates mild increased uptake (SUV max 5.6 )     2. Additional uptake in the nonenlarged left level 2/3 lymph nodes (SUV max 6.7) and right level 2 lymph nodes (SUV max 5.3) could relate to lymphoma as well      PET scan 12/27/22     1. Complete response to therapy with resolution of prior left submandibular lymph node mass. Deauville score is 1.     2. No increased uptake in the bilateral neck. No enlarged lymph nodes in the neck.     Pathology:  FINAL DIAGNOSIS:     A. Left submandibular node core biopsy:          Diffuse large B cell lymphoma, GCB type.          C-MYC IHC is negative which shows this is not a double           expressor.     Synoptic Summary for Non-Hodgkin Lymphoma/Lymphoid Neoplasms:          -Specimen: Lymph node        -Procedure: Core biopsy        -Tumor site: Left submandibular        -Histologic type: Diffuse large B cell lymphoma, GCB        -Immunophenotyping:            Flow cytometry: Performed; please refer to microscopic             description for details.            Immunohistochemistry: Performed; please refer to microscopic             description for details.        -Cytogenetic Studies: Not performed.        -Molecular Genetic Studies: FISH is negative for Bcl-2, Bcl-6,         C-MYC and t(11;14)- negative for double/triple hit.            Comment: One area of the core shows lower grade cells that have           a lower Ki-67 of   10% and may either represent either only a           partially involved node or an area of lower grade lymphoma such           as follicular lymphoma.  If this distinction is clinically           important than an excision of the whole node is necessary for           evaluation.  This case has been reviewed by a second           pathologist, Dr. Villanueva, who concurs with the diagnosis.       Bone Marrow Biopsy  FINAL  DIAGNOSIS:     Peripheral blood smear and CBC:          Normal white blood cell count demonstrating a mild relative           monocytosis.   Bone marrow aspirate, clot, and core biopsy:          -Normal cellular bone marrow demonstrating:          -There is no morphologic, immunohistochemical or flow cytometric           evidence for bone marrow involvement by lymphoma.          -Trilineage hematopoiesis with maturation.          -No morphologic increase in blast cells.          -Focal dysmegakaryocytopoiesis (<10%).          -Mild increased polyclonal plasma cells.          -Adequate stainable bone marrow iron stores.          See comment.         Assessment/Plan:      Cancer Staging   Diffuse large B-cell lymphoma of lymph nodes of neck (HCC)  Staging form: Hodgkin and Non-Hodgkin Lymphoma, AJCC 8th Edition  - Clinical stage from 10/4/2022: Stage IIE (Diffuse large B-cell lymphoma) - Signed by Henna Kwan M.D. on 10/4/2022         Mr. Mejia is a 52 yo M who presents today for surveillance after RCHOP treatment for DLBCL.     Most recent scan shows no LAD and CARMENZA. He is doing well.     Labs reviewed and look stable.     Plan:  -HP in 3 months (2/30/24)  -CT CAP in 6 months (5/30/24)  -patient to get labs CBC, CMP, LDH in 3 months  -patient has PORT in place, will need it flushed.  Can consider removing at that time.     Surveillance will consist of:  -HP and labs every 3-6 months for 5 years and then yearly or as clinically indicated  Managing will consist of a CT chest abdomen pelvis with contrast no more often than every 6 months for 2 years      #2. Lung nodule  -Very small on CT CAP  -will closely monitor    #3.  Pars defects  -Pars defects at L5-S1.  -referral to Ortho    Any questions and concerns raised by the patient were addressed and answered. Patient denies any further questions.  Patient encouraged to call the office with any concerns or issues.     Henna Kwan,  M.D.  Hematology/Oncology    30 minutes spent on this case

## 2023-12-18 NOTE — ADDENDUM NOTE
Encounter addended by: Jorge Espinoza on: 12/18/2023 3:36 PM   Actions taken: Charge Capture section accepted

## 2024-03-25 ENCOUNTER — OUTPATIENT INFUSION SERVICES (OUTPATIENT)
Dept: ONCOLOGY | Facility: MEDICAL CENTER | Age: 54
End: 2024-03-25
Attending: STUDENT IN AN ORGANIZED HEALTH CARE EDUCATION/TRAINING PROGRAM
Payer: COMMERCIAL

## 2024-03-25 VITALS
DIASTOLIC BLOOD PRESSURE: 81 MMHG | TEMPERATURE: 98 F | SYSTOLIC BLOOD PRESSURE: 131 MMHG | RESPIRATION RATE: 18 BRPM | OXYGEN SATURATION: 95 % | HEART RATE: 72 BPM

## 2024-03-25 DIAGNOSIS — C83.31 DIFFUSE LARGE B-CELL LYMPHOMA OF LYMPH NODES OF NECK (HCC): ICD-10-CM

## 2024-03-25 DIAGNOSIS — Z95.828 PORT-A-CATH IN PLACE: ICD-10-CM

## 2024-03-25 LAB
ALBUMIN SERPL BCP-MCNC: 4.3 G/DL (ref 3.2–4.9)
ALBUMIN/GLOB SERPL: 1.5 G/DL
ALP SERPL-CCNC: 92 U/L (ref 30–99)
ALT SERPL-CCNC: 23 U/L (ref 2–50)
ANION GAP SERPL CALC-SCNC: 12 MMOL/L (ref 7–16)
AST SERPL-CCNC: 29 U/L (ref 12–45)
BASOPHILS # BLD AUTO: 0.6 % (ref 0–1.8)
BASOPHILS # BLD: 0.04 K/UL (ref 0–0.12)
BILIRUB SERPL-MCNC: 0.6 MG/DL (ref 0.1–1.5)
BUN SERPL-MCNC: 12 MG/DL (ref 8–22)
CALCIUM ALBUM COR SERPL-MCNC: 8.8 MG/DL (ref 8.5–10.5)
CALCIUM SERPL-MCNC: 9 MG/DL (ref 8.5–10.5)
CHLORIDE SERPL-SCNC: 101 MMOL/L (ref 96–112)
CO2 SERPL-SCNC: 23 MMOL/L (ref 20–33)
CREAT SERPL-MCNC: 0.62 MG/DL (ref 0.5–1.4)
EOSINOPHIL # BLD AUTO: 0.31 K/UL (ref 0–0.51)
EOSINOPHIL NFR BLD: 4.7 % (ref 0–6.9)
ERYTHROCYTE [DISTWIDTH] IN BLOOD BY AUTOMATED COUNT: 40.7 FL (ref 35.9–50)
GFR SERPLBLD CREATININE-BSD FMLA CKD-EPI: 113 ML/MIN/1.73 M 2
GLOBULIN SER CALC-MCNC: 2.8 G/DL (ref 1.9–3.5)
GLUCOSE SERPL-MCNC: 104 MG/DL (ref 65–99)
HCT VFR BLD AUTO: 43.2 % (ref 42–52)
HGB BLD-MCNC: 14.5 G/DL (ref 14–18)
IMM GRANULOCYTES # BLD AUTO: 0.01 K/UL (ref 0–0.11)
IMM GRANULOCYTES NFR BLD AUTO: 0.2 % (ref 0–0.9)
LDH SERPL L TO P-CCNC: 239 U/L (ref 107–266)
LYMPHOCYTES # BLD AUTO: 2.9 K/UL (ref 1–4.8)
LYMPHOCYTES NFR BLD: 44.3 % (ref 22–41)
MCH RBC QN AUTO: 32.4 PG (ref 27–33)
MCHC RBC AUTO-ENTMCNC: 33.6 G/DL (ref 32.3–36.5)
MCV RBC AUTO: 96.6 FL (ref 81.4–97.8)
MONOCYTES # BLD AUTO: 0.44 K/UL (ref 0–0.85)
MONOCYTES NFR BLD AUTO: 6.7 % (ref 0–13.4)
NEUTROPHILS # BLD AUTO: 2.84 K/UL (ref 1.82–7.42)
NEUTROPHILS NFR BLD: 43.5 % (ref 44–72)
NRBC # BLD AUTO: 0 K/UL
NRBC BLD-RTO: 0 /100 WBC (ref 0–0.2)
PLATELET # BLD AUTO: 244 K/UL (ref 164–446)
PMV BLD AUTO: 8.7 FL (ref 9–12.9)
POTASSIUM SERPL-SCNC: 3.9 MMOL/L (ref 3.6–5.5)
PROT SERPL-MCNC: 7.1 G/DL (ref 6–8.2)
RBC # BLD AUTO: 4.47 M/UL (ref 4.7–6.1)
SODIUM SERPL-SCNC: 136 MMOL/L (ref 135–145)
WBC # BLD AUTO: 6.5 K/UL (ref 4.8–10.8)

## 2024-03-25 PROCEDURE — A4212 NON CORING NEEDLE OR STYLET: HCPCS

## 2024-03-25 PROCEDURE — 83615 LACTATE (LD) (LDH) ENZYME: CPT

## 2024-03-25 PROCEDURE — 700101 HCHG RX REV CODE 250: Performed by: STUDENT IN AN ORGANIZED HEALTH CARE EDUCATION/TRAINING PROGRAM

## 2024-03-25 PROCEDURE — 36591 DRAW BLOOD OFF VENOUS DEVICE: CPT

## 2024-03-25 PROCEDURE — 80053 COMPREHEN METABOLIC PANEL: CPT

## 2024-03-25 PROCEDURE — 85025 COMPLETE CBC W/AUTO DIFF WBC: CPT

## 2024-03-25 PROCEDURE — 700111 HCHG RX REV CODE 636 W/ 250 OVERRIDE (IP): Performed by: STUDENT IN AN ORGANIZED HEALTH CARE EDUCATION/TRAINING PROGRAM

## 2024-03-25 RX ADMIN — LIDOCAINE HYDROCHLORIDE 0.5 ML: 10 INJECTION, SOLUTION EPIDURAL; INFILTRATION; INTRACAUDAL at 14:12

## 2024-03-25 RX ADMIN — HEPARIN 500 UNITS: 100 SYRINGE at 14:17

## 2024-03-25 NOTE — PROGRESS NOTES
Pt arrived ambulatory for Q3 month port flush with lab draw, denies c/o, states he is doing well.  Port accessed per protocol with good blood return, labs drawn, flushed and deaccessed.  Pt Dc'd home without incident and will f/u in 3 months as scheduled, pt aware he has MD appt with Dr. Kwan tomorrow and will f/u.

## 2024-03-26 ENCOUNTER — HOSPITAL ENCOUNTER (OUTPATIENT)
Dept: HEMATOLOGY ONCOLOGY | Facility: MEDICAL CENTER | Age: 54
End: 2024-03-26
Attending: STUDENT IN AN ORGANIZED HEALTH CARE EDUCATION/TRAINING PROGRAM
Payer: COMMERCIAL

## 2024-03-26 VITALS
BODY MASS INDEX: 28.56 KG/M2 | DIASTOLIC BLOOD PRESSURE: 72 MMHG | WEIGHT: 171.41 LBS | HEART RATE: 81 BPM | HEIGHT: 65 IN | TEMPERATURE: 97.3 F | OXYGEN SATURATION: 95 % | SYSTOLIC BLOOD PRESSURE: 118 MMHG

## 2024-03-26 DIAGNOSIS — C83.31 DIFFUSE LARGE B-CELL LYMPHOMA OF LYMPH NODES OF NECK (HCC): ICD-10-CM

## 2024-03-26 PROCEDURE — 99213 OFFICE O/P EST LOW 20 MIN: CPT | Performed by: STUDENT IN AN ORGANIZED HEALTH CARE EDUCATION/TRAINING PROGRAM

## 2024-03-26 PROCEDURE — 99212 OFFICE O/P EST SF 10 MIN: CPT | Performed by: STUDENT IN AN ORGANIZED HEALTH CARE EDUCATION/TRAINING PROGRAM

## 2024-03-26 ASSESSMENT — ENCOUNTER SYMPTOMS
SHORTNESS OF BREATH: 0
SENSORY CHANGE: 0
MEMORY LOSS: 0
CHILLS: 0
VOMITING: 0
WEIGHT LOSS: 0
BRUISES/BLEEDS EASILY: 0
SPUTUM PRODUCTION: 0
PALPITATIONS: 0
COUGH: 0
HEADACHES: 0
NAUSEA: 0
DEPRESSION: 0
FOCAL WEAKNESS: 0
TINGLING: 0
FEVER: 0
HEARTBURN: 0
WHEEZING: 1
BLURRED VISION: 0
ABDOMINAL PAIN: 0
SORE THROAT: 0
NECK PAIN: 0
TREMORS: 0
DIZZINESS: 0
ORTHOPNEA: 0

## 2024-03-26 ASSESSMENT — FIBROSIS 4 INDEX: FIB4 SCORE: 1.34

## 2024-03-26 NOTE — ADDENDUM NOTE
Encounter addended by: Henna Kwan M.D. on: 3/26/2024 8:16 AM   Actions taken: SmartForm saved, Clinical Note Signed

## 2024-03-26 NOTE — PROGRESS NOTES
Consult Note: Hematology/Oncology     Referring Provider: Peggy Kinsey  Primary Care:  Carlos Mcnamara M.D.    Chief Complaint   Patient presents with    Lymphoma     Three month follow up        Current Treatment:   10/27/22: C1D1 RCHOP  11/17/22: C2D1 RCHOP  12/08/22: C3D1 RCHOP  12/29/22: C4D1 RCHOP    Prior Treatment: None    Subjective:   History of Presenting Illness:  Azeem Mejia is a 53 y.o. male who presents today with a new diagnosis of diffuse large B-cell lymphoma.    Patient was originally seen by his PCP on August 17, 2022 with a submandibular lymphadenopathy.  He reported a 2-week history of lymphadenopathy.  Blood work at that time showed no abnormalities.  Patient underwent an ultrasound the same day which showed a 3.1 x 1.7 x 2.1 cm lymph node on the left submandibular region.  He was referred to Peggy on August 30, 2022 for submandibular lymphadenopathy work-up.    Patient underwent a CT with contrast which showed a 2.2 x 2.5 x 3.4 cm mass.  And he underwent a biopsy on September 7 with initial pathology showing a B-cell lymphoproliferative disorder with dim CD10 expression.  FISH analysis was negative for CCN D1 IGH, Bcl-2, BCL6, MYC.  Final path showed diffuse large B-cell lymphoma with germinal center B type.  C-Myc was negative.  Patient is not a double hit expresser.    He works in [employer].  He was a major in literature. 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     Interval History:    Patient reports that he is doing great.  No unexpected weight loss, no fevers, chills and NS.     Labs reviewed with patient.    Past Medical History:   Diagnosis Date    Asthma     Cancer (HCC) 10/21/2022    left submandibular lymphoma large B cell    Dental disorder 10/21/2022    upper partial, lower left front times 1 cap    Diffuse large B-cell lymphoma of lymph nodes of  neck (HCC)     Hyperlipidemia         Past Surgical History:   Procedure Laterality Date    [place] DX BONE MARROW ASPIRATIONS Left 9/27/2022    Procedure: BONE MARROW CORE AND ASPIRATION;  Surgeon: Subhash Mathews M.D.;  Location: SURGERY SAME DAY [facility];  Service: Orthopedics    [place] DX BONE MARROW BIOPSIES Left 9/27/2022    Procedure: BIOPSY, BONE MARROW, USING NEEDLE OR TROCAR - FLEMING;  Surgeon: Subhash Mathews M.D.;  Location: SURGERY SAME DAY [facility];  Service: Orthopedics    NASAL POLYPECTOMY Right        Social History     Tobacco Use    Smoking status: Never    Smokeless tobacco: Never   Vaping Use    Vaping Use: Never used   Substance Use Topics    Alcohol use: Yes     Comment: very rare    Drug use: Not Currently        Family History   Problem Relation Age of Onset    Diabetes Father     Asthma Sister     Hyperlipidemia Neg Hx     Hypertension Neg Hx     Cancer Neg Hx        Allergies   Allergen Reactions    Seasonal Runny Nose and Itching     Eyes and nose       Current Outpatient Medications   Medication Sig Dispense Refill    ALBUTEROL INH Inhale.      lidocaine-prilocaine (EMLA) 2.5-2.5 % Cream Apply to port 1 hour prior to access of port and cover with plastic wrap. 30 g 3    acetaminophen (TYLENOL) 500 MG Tab Take 500 mg by mouth every 6 hours as needed. Indications: Pain      predniSONE (DELTASONE) 50 MG Tab Take 1 Tablet by mouth every day. Take 2 tablets (100mg) on days 1-5 of chemotherapy (Patient not taking: Reported on 3/20/2023)       No current facility-administered medications for this encounter.       Review of Systems   Constitutional:  Positive for malaise/fatigue (works nights, attributes fatigue to this). Negative for chills, fever and weight loss.   HENT:  Negative for congestion, ear pain, nosebleeds and sore throat.    Eyes:  Negative for blurred vision.   Respiratory:  Positive for wheezing. Negative for cough, sputum production and shortness of breath.    Cardiovascular:  Negative for  "chest pain, palpitations, orthopnea and leg swelling.   Gastrointestinal:  Negative for abdominal pain, heartburn, nausea and vomiting.   Genitourinary:  Negative for dysuria, frequency and urgency.   Musculoskeletal:  Negative for neck pain.   Neurological:  Negative for dizziness, tingling, tremors, sensory change, focal weakness and headaches.   Endo/Heme/Allergies:  Does not bruise/bleed easily.   Psychiatric/Behavioral:  Negative for depression, memory loss and suicidal ideas.    All other systems reviewed and are negative.      Problem list, medications, and allergies reviewed by myself today in Epic.     Objective:     Vitals:    03/26/24 0803   BP: 118/72   BP Location: Right arm   Patient Position: Sitting   BP Cuff Size: Adult   Pulse: 81   Temp: 36.3 °C (97.3 °F)   TempSrc: Temporal   SpO2: 95%   Weight: 77.7 kg (171 lb 6.5 oz)   Height: 1.64 m (5' 4.57\")       DESC; KARNOFSKY SCALE WITH ECOG EQUIVALENT: 90, Able to carry on normal activity; minor signs or symptoms of disease (ECOG equivalent 0)    DISTRESS LEVEL: no apparent distress    Physical Exam  Constitutional:       General: He is not in acute distress.     Appearance: Normal appearance.      Comments: Port in place, no sign of infection   HENT:      Head: Normocephalic and atraumatic.      Nose: Nose normal. No congestion.      Mouth/Throat:      Mouth: Mucous membranes are moist.      Pharynx: Oropharynx is clear.   Eyes:      General: No scleral icterus.     Conjunctiva/sclera: Conjunctivae normal.      Pupils: Pupils are equal, round, and reactive to light.   Cardiovascular:      Rate and Rhythm: Normal rate and regular rhythm.      Pulses: Normal pulses.      Heart sounds: No murmur heard.     No friction rub.   Pulmonary:      Effort: Pulmonary effort is normal. No respiratory distress.      Breath sounds: No stridor. Wheezing present. No rales.   Chest:      Chest wall: No tenderness.   Abdominal:      General: Abdomen is flat. Bowel sounds " are normal. There is no distension.      Palpations: Abdomen is soft. There is no mass.      Tenderness: There is no abdominal tenderness. There is no guarding or rebound.   Musculoskeletal:         General: No swelling, tenderness or deformity. Normal range of motion.      Cervical back: Normal range of motion and neck supple. No rigidity or tenderness.      Right lower leg: No edema.      Left lower leg: No edema.   Skin:     General: Skin is warm.      Coloration: Skin is not jaundiced or pale.      Findings: No bruising or rash.   Neurological:      General: No focal deficit present.      Mental Status: He is alert and oriented to person, place, and time. Mental status is at baseline.      Motor: No weakness.   Psychiatric:         Mood and Affect: Mood normal.         Behavior: Behavior normal.         Thought Content: Thought content normal.         Judgment: Judgment normal.         Labs:   Most recent labs reviewed.  CBC and CMP  grossly normal.  HIV negative hepatitis B negative LDH normal uric acid normal     Imaging:   Most recent images below have been independently reviewed by me.      11/29/23 CT CAP  1. No evidence of disease recurrence.  2. Stable likely benign 5 mm right upper lobe pulmonary nodule.  3. 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    CT CAP  IMPRESSION:     1.  No lymphadenopathy in the chest, abdomen or pelvis. No splenomegaly.  2.  Stable 5 mm subpleural nodule in the right lung apex. No new pulmonary nodules or masses.    ECHO  Normal left ventricular size, thickness, and systolic function.  The left ventricular ejection fraction is visually estimated to be 55%.  Mild hypokinesis basal posterior wall otherwise normal regional wall   motion.  Normal right ventricular size and systolic function.  No prior study is available for comparison    PET SCAN    1. Interval decrease in size of the left submandibular lymph node mass, measuring 1.3 x 1.9 cm, previously 2.5 x 3.5 cm. The mass demonstrates mild  increased uptake (SUV max 5.6 )     2. Additional uptake in the nonenlarged left level 2/3 lymph nodes (SUV max 6.7) and right level 2 lymph nodes (SUV max 5.3) could relate to lymphoma as well      PET scan 12/27/22     1. Complete response to therapy with resolution of prior left submandibular lymph node mass. Deauville score is 1.     2. No increased uptake in the bilateral neck. No enlarged lymph nodes in the neck.     Pathology:  FINAL DIAGNOSIS:     A. Left submandibular node core biopsy:          Diffuse large B cell lymphoma, GCB type.          C-MYC IHC is negative which shows this is not a double           expressor.     Synoptic Summary for Non-Hodgkin Lymphoma/Lymphoid Neoplasms:          -Specimen: Lymph node        -Procedure: Core biopsy        -Tumor site: Left submandibular        -Histologic type: Diffuse large B cell lymphoma, GCB        -Immunophenotyping:            Flow cytometry: Performed; please refer to microscopic             description for details.            Immunohistochemistry: Performed; please refer to microscopic             description for details.        -Cytogenetic Studies: Not performed.        -Molecular Genetic Studies: FISH is negative for Bcl-2, Bcl-6,         C-MYC and t(11;14)- negative for double/triple hit.            Comment: One area of the core shows lower grade cells that have           a lower Ki-67 of   10% and may either represent either only a           partially involved node or an area of lower grade lymphoma such           as follicular lymphoma.  If this distinction is clinically           important than an excision of the whole node is necessary for           evaluation.  This case has been reviewed by a second           pathologist, Dr. Villanueva, who concurs with the diagnosis.       Bone Marrow Biopsy  FINAL DIAGNOSIS:     Peripheral blood smear and CBC:          Normal white blood cell count demonstrating a mild relative           monocytosis.   Bone  marrow aspirate, clot, and core biopsy:          -Normal cellular bone marrow demonstrating:          -There is no morphologic, immunohistochemical or flow cytometric           evidence for bone marrow involvement by lymphoma.          -Trilineage hematopoiesis with maturation.          -No morphologic increase in blast cells.          -Focal dysmegakaryocytopoiesis (<10%).          -Mild increased polyclonal plasma cells.          -Adequate stainable bone marrow iron stores.          See comment.         Assessment/Plan:      Cancer Staging   Diffuse large B-cell lymphoma of lymph nodes of neck (HCC)  Staging form: Hodgkin and Non-Hodgkin Lymphoma, AJCC 8th Edition  - Clinical stage from 10/4/2022: Stage IIE (Diffuse large B-cell lymphoma) - Signed by Henna Kwan M.D. on 10/4/2022         Mr. Mejia is a 52 yo M who presents today for surveillance after RCHOP treatment for DLBCL.     Labs reviewed and look stable.     Plan:  -HP in 3 months (6/30/24)  -CT CAP in 6 months (6/30/24)  -patient to get labs CBC, CMP, LDH in 3 months  -patient has PORT in place, will need it flushed.  Can consider removing at that time.     Surveillance will consist of:  -HP and labs every 3-6 months for 5 years and then yearly or as clinically indicated  Managing will consist of a CT chest abdomen pelvis with contrast no more often than every 6 months for 2 years      #2. Lung nodule  -Very small on CT CAP  -will closely monitor    #3.  Pars defects  -followed by ortho    #4. Wheeze  -pt reports this is 2/2 to asthma and allergies    Any questions and concerns raised by the patient were addressed and answered. Patient denies any further questions.  Patient encouraged to call the office with any concerns or issues.     Henna Kwan M.D.  Hematology/Oncology    23 minutes spent on this case

## 2024-03-26 NOTE — ADDENDUM NOTE
Encounter addended by: oJrge Espinoza on: 3/26/2024 8:17 AM   Actions taken: Charge Capture section accepted

## 2024-06-25 RX ORDER — 0.9 % SODIUM CHLORIDE 0.9 %
20 VIAL (ML) INJECTION PRN
Status: CANCELLED | OUTPATIENT
Start: 2024-06-27

## 2024-06-25 RX ORDER — HEPARIN SODIUM (PORCINE) LOCK FLUSH IV SOLN 100 UNIT/ML 100 UNIT/ML
500 SOLUTION INTRAVENOUS PRN
Status: CANCELLED | OUTPATIENT
Start: 2024-06-27

## 2024-06-27 ENCOUNTER — OUTPATIENT INFUSION SERVICES (OUTPATIENT)
Dept: ONCOLOGY | Facility: MEDICAL CENTER | Age: 54
End: 2024-06-27
Attending: STUDENT IN AN ORGANIZED HEALTH CARE EDUCATION/TRAINING PROGRAM
Payer: COMMERCIAL

## 2024-06-27 ENCOUNTER — APPOINTMENT (OUTPATIENT)
Dept: RADIOLOGY | Facility: MEDICAL CENTER | Age: 54
End: 2024-06-27
Attending: STUDENT IN AN ORGANIZED HEALTH CARE EDUCATION/TRAINING PROGRAM
Payer: COMMERCIAL

## 2024-06-27 VITALS
HEART RATE: 70 BPM | OXYGEN SATURATION: 97 % | TEMPERATURE: 96.9 F | RESPIRATION RATE: 18 BRPM | DIASTOLIC BLOOD PRESSURE: 83 MMHG | SYSTOLIC BLOOD PRESSURE: 129 MMHG

## 2024-06-27 DIAGNOSIS — C83.31 DIFFUSE LARGE B-CELL LYMPHOMA OF LYMPH NODES OF NECK (HCC): ICD-10-CM

## 2024-06-27 DIAGNOSIS — Z95.828 PORT-A-CATH IN PLACE: ICD-10-CM

## 2024-06-27 LAB
ALBUMIN SERPL BCP-MCNC: 4.1 G/DL (ref 3.2–4.9)
ALBUMIN/GLOB SERPL: 1.4 G/DL
ALP SERPL-CCNC: 86 U/L (ref 30–99)
ALT SERPL-CCNC: 27 U/L (ref 2–50)
ANION GAP SERPL CALC-SCNC: 10 MMOL/L (ref 7–16)
AST SERPL-CCNC: 28 U/L (ref 12–45)
BASOPHILS # BLD AUTO: 0.6 % (ref 0–1.8)
BASOPHILS # BLD: 0.04 K/UL (ref 0–0.12)
BILIRUB SERPL-MCNC: 0.7 MG/DL (ref 0.1–1.5)
BUN SERPL-MCNC: 15 MG/DL (ref 8–22)
CALCIUM ALBUM COR SERPL-MCNC: 8.8 MG/DL (ref 8.5–10.5)
CALCIUM SERPL-MCNC: 8.9 MG/DL (ref 8.5–10.5)
CHLORIDE SERPL-SCNC: 102 MMOL/L (ref 96–112)
CO2 SERPL-SCNC: 24 MMOL/L (ref 20–33)
CREAT SERPL-MCNC: 0.61 MG/DL (ref 0.5–1.4)
EOSINOPHIL # BLD AUTO: 0.25 K/UL (ref 0–0.51)
EOSINOPHIL NFR BLD: 3.8 % (ref 0–6.9)
ERYTHROCYTE [DISTWIDTH] IN BLOOD BY AUTOMATED COUNT: 40 FL (ref 35.9–50)
GFR SERPLBLD CREATININE-BSD FMLA CKD-EPI: 114 ML/MIN/1.73 M 2
GLOBULIN SER CALC-MCNC: 2.9 G/DL (ref 1.9–3.5)
GLUCOSE SERPL-MCNC: 103 MG/DL (ref 65–99)
HCT VFR BLD AUTO: 42.3 % (ref 42–52)
HGB BLD-MCNC: 14.4 G/DL (ref 14–18)
IMM GRANULOCYTES # BLD AUTO: 0.02 K/UL (ref 0–0.11)
IMM GRANULOCYTES NFR BLD AUTO: 0.3 % (ref 0–0.9)
LDH SERPL L TO P-CCNC: 217 U/L (ref 107–266)
LYMPHOCYTES # BLD AUTO: 2.46 K/UL (ref 1–4.8)
LYMPHOCYTES NFR BLD: 37.2 % (ref 22–41)
MCH RBC QN AUTO: 33 PG (ref 27–33)
MCHC RBC AUTO-ENTMCNC: 34 G/DL (ref 32.3–36.5)
MCV RBC AUTO: 96.8 FL (ref 81.4–97.8)
MONOCYTES # BLD AUTO: 0.52 K/UL (ref 0–0.85)
MONOCYTES NFR BLD AUTO: 7.9 % (ref 0–13.4)
NEUTROPHILS # BLD AUTO: 3.32 K/UL (ref 1.82–7.42)
NEUTROPHILS NFR BLD: 50.2 % (ref 44–72)
NRBC # BLD AUTO: 0 K/UL
NRBC BLD-RTO: 0 /100 WBC (ref 0–0.2)
PLATELET # BLD AUTO: 204 K/UL (ref 164–446)
PMV BLD AUTO: 8.8 FL (ref 9–12.9)
POTASSIUM SERPL-SCNC: 3.9 MMOL/L (ref 3.6–5.5)
PROT SERPL-MCNC: 7 G/DL (ref 6–8.2)
RBC # BLD AUTO: 4.37 M/UL (ref 4.7–6.1)
SODIUM SERPL-SCNC: 136 MMOL/L (ref 135–145)
WBC # BLD AUTO: 6.6 K/UL (ref 4.8–10.8)

## 2024-06-27 PROCEDURE — 80053 COMPREHEN METABOLIC PANEL: CPT

## 2024-06-27 PROCEDURE — 83615 LACTATE (LD) (LDH) ENZYME: CPT

## 2024-06-27 PROCEDURE — A4212 NON CORING NEEDLE OR STYLET: HCPCS

## 2024-06-27 PROCEDURE — 85025 COMPLETE CBC W/AUTO DIFF WBC: CPT

## 2024-06-27 PROCEDURE — 700111 HCHG RX REV CODE 636 W/ 250 OVERRIDE (IP): Performed by: NURSE PRACTITIONER

## 2024-06-27 PROCEDURE — 36591 DRAW BLOOD OFF VENOUS DEVICE: CPT

## 2024-06-27 PROCEDURE — 700101 HCHG RX REV CODE 250: Performed by: NURSE PRACTITIONER

## 2024-06-27 RX ADMIN — HEPARIN 500 UNITS: 100 SYRINGE at 16:07

## 2024-06-27 RX ADMIN — LIDOCAINE HYDROCHLORIDE 0.5 ML: 10 INJECTION, SOLUTION EPIDURAL; INFILTRATION; INTRACAUDAL at 15:58

## 2024-06-27 NOTE — PROGRESS NOTES
Pt arrived ambulatory to [facility] for lab draw/port flush. POC discussed with pt and he agrees with plan.    Port numbed with lidocaine per pt request. Port accessed in sterile fashion, brisk blood return noted. Labs drawn as ordered. Port flushed with NS then heparin. Port de-accessed, gauze dressing applied.     Pt discharged to self care, [facility]. Pt states he will follow up with Dr Kwan and contact scheduling for next appointment.

## 2024-06-28 ENCOUNTER — APPOINTMENT (OUTPATIENT)
Dept: RADIOLOGY | Facility: MEDICAL CENTER | Age: 54
End: 2024-06-28
Attending: STUDENT IN AN ORGANIZED HEALTH CARE EDUCATION/TRAINING PROGRAM
Payer: COMMERCIAL

## 2024-07-01 ENCOUNTER — APPOINTMENT (OUTPATIENT)
Dept: HEMATOLOGY ONCOLOGY | Facility: MEDICAL CENTER | Age: 54
End: 2024-07-01
Payer: COMMERCIAL

## 2024-07-02 ENCOUNTER — APPOINTMENT (OUTPATIENT)
Dept: RADIOLOGY | Facility: MEDICAL CENTER | Age: 54
End: 2024-07-02
Attending: STUDENT IN AN ORGANIZED HEALTH CARE EDUCATION/TRAINING PROGRAM
Payer: COMMERCIAL

## 2024-07-02 DIAGNOSIS — C83.31 DIFFUSE LARGE B-CELL LYMPHOMA OF LYMPH NODES OF NECK (HCC): ICD-10-CM

## 2024-07-02 PROCEDURE — 700117 HCHG RX CONTRAST REV CODE 255: Performed by: STUDENT IN AN ORGANIZED HEALTH CARE EDUCATION/TRAINING PROGRAM

## 2024-07-02 PROCEDURE — 71260 CT THORAX DX C+: CPT

## 2024-07-02 RX ADMIN — IOHEXOL 100 ML: 350 INJECTION, SOLUTION INTRAVENOUS at 15:46

## 2024-07-05 ENCOUNTER — HOSPITAL ENCOUNTER (OUTPATIENT)
Dept: HEMATOLOGY ONCOLOGY | Facility: MEDICAL CENTER | Age: 54
End: 2024-07-05
Attending: STUDENT IN AN ORGANIZED HEALTH CARE EDUCATION/TRAINING PROGRAM
Payer: COMMERCIAL

## 2024-07-05 VITALS
DIASTOLIC BLOOD PRESSURE: 66 MMHG | BODY MASS INDEX: 27.73 KG/M2 | OXYGEN SATURATION: 92 % | HEART RATE: 71 BPM | HEIGHT: 65 IN | TEMPERATURE: 97.5 F | WEIGHT: 166.45 LBS | SYSTOLIC BLOOD PRESSURE: 104 MMHG

## 2024-07-05 DIAGNOSIS — C83.31 DIFFUSE LARGE B-CELL LYMPHOMA OF LYMPH NODES OF NECK (HCC): ICD-10-CM

## 2024-07-05 DIAGNOSIS — R21 RASH: ICD-10-CM

## 2024-07-05 PROCEDURE — 99214 OFFICE O/P EST MOD 30 MIN: CPT | Performed by: STUDENT IN AN ORGANIZED HEALTH CARE EDUCATION/TRAINING PROGRAM

## 2024-07-05 PROCEDURE — 99212 OFFICE O/P EST SF 10 MIN: CPT | Performed by: STUDENT IN AN ORGANIZED HEALTH CARE EDUCATION/TRAINING PROGRAM

## 2024-07-05 ASSESSMENT — ENCOUNTER SYMPTOMS
SENSORY CHANGE: 0
TINGLING: 0
FOCAL WEAKNESS: 0
SORE THROAT: 0
NAUSEA: 0
DEPRESSION: 0
WEIGHT LOSS: 0
BRUISES/BLEEDS EASILY: 0
FEVER: 0
ORTHOPNEA: 0
BLURRED VISION: 0
COUGH: 0
ABDOMINAL PAIN: 0
NECK PAIN: 0
VOMITING: 0
SHORTNESS OF BREATH: 0
HEADACHES: 0
CHILLS: 0
TREMORS: 0
PALPITATIONS: 0
MEMORY LOSS: 0
DIZZINESS: 0
WHEEZING: 1
HEARTBURN: 0
SPUTUM PRODUCTION: 0

## 2024-07-05 ASSESSMENT — FIBROSIS 4 INDEX: FIB4 SCORE: 1.43

## 2024-07-25 ENCOUNTER — OFFICE VISIT (OUTPATIENT)
Dept: DERMATOLOGY | Facility: IMAGING CENTER | Age: 54
End: 2024-07-25
Payer: COMMERCIAL

## 2024-07-25 DIAGNOSIS — L40.9 PSORIASIS: ICD-10-CM

## 2024-07-25 PROCEDURE — 99204 OFFICE O/P NEW MOD 45 MIN: CPT | Performed by: STUDENT IN AN ORGANIZED HEALTH CARE EDUCATION/TRAINING PROGRAM

## 2024-07-25 RX ORDER — CLOBETASOL PROPIONATE 0.5 MG/ML
SOLUTION TOPICAL
Qty: 50 ML | Refills: 5 | Status: SHIPPED | OUTPATIENT
Start: 2024-07-25

## 2024-10-10 ENCOUNTER — HOSPITAL ENCOUNTER (OUTPATIENT)
Dept: HEMATOLOGY ONCOLOGY | Facility: MEDICAL CENTER | Age: 54
End: 2024-10-10
Attending: NURSE PRACTITIONER
Payer: COMMERCIAL

## 2024-10-10 VITALS
DIASTOLIC BLOOD PRESSURE: 72 MMHG | RESPIRATION RATE: 16 BRPM | WEIGHT: 169.09 LBS | HEIGHT: 65 IN | BODY MASS INDEX: 28.17 KG/M2 | OXYGEN SATURATION: 95 % | TEMPERATURE: 97.4 F | HEART RATE: 72 BPM | SYSTOLIC BLOOD PRESSURE: 118 MMHG

## 2024-10-10 DIAGNOSIS — C83.31 DIFFUSE LARGE B-CELL LYMPHOMA OF LYMPH NODES OF NECK (HCC): ICD-10-CM

## 2024-10-10 PROCEDURE — 99214 OFFICE O/P EST MOD 30 MIN: CPT | Performed by: NURSE PRACTITIONER

## 2024-10-10 PROCEDURE — 99212 OFFICE O/P EST SF 10 MIN: CPT | Performed by: NURSE PRACTITIONER

## 2024-10-10 RX ORDER — LORATADINE 10 MG/1
10 TABLET ORAL DAILY
COMMUNITY

## 2024-10-10 RX ORDER — HEPARIN SODIUM (PORCINE) LOCK FLUSH IV SOLN 100 UNIT/ML 100 UNIT/ML
500 SOLUTION INTRAVENOUS PRN
Status: CANCELLED | OUTPATIENT
Start: 2024-10-16

## 2024-10-10 RX ORDER — 0.9 % SODIUM CHLORIDE 0.9 %
20 VIAL (ML) INJECTION PRN
Status: CANCELLED | OUTPATIENT
Start: 2024-10-16

## 2024-10-10 ASSESSMENT — ENCOUNTER SYMPTOMS
DIARRHEA: 0
WEIGHT LOSS: 0
NAUSEA: 0
VOMITING: 0
PALPITATIONS: 0
SHORTNESS OF BREATH: 0
CONSTIPATION: 0
CHILLS: 0
FEVER: 0
COUGH: 0
HEADACHES: 1
DIAPHORESIS: 0
DIZZINESS: 0
MYALGIAS: 0

## 2024-10-10 ASSESSMENT — PAIN SCALES - GENERAL: PAINLEVEL: NO PAIN

## 2024-10-10 ASSESSMENT — FIBROSIS 4 INDEX: FIB4 SCORE: 1.45

## 2024-10-16 ENCOUNTER — OUTPATIENT INFUSION SERVICES (OUTPATIENT)
Dept: ONCOLOGY | Facility: MEDICAL CENTER | Age: 54
End: 2024-10-16
Attending: STUDENT IN AN ORGANIZED HEALTH CARE EDUCATION/TRAINING PROGRAM
Payer: COMMERCIAL

## 2024-10-16 VITALS
OXYGEN SATURATION: 96 % | SYSTOLIC BLOOD PRESSURE: 123 MMHG | HEART RATE: 90 BPM | DIASTOLIC BLOOD PRESSURE: 80 MMHG | RESPIRATION RATE: 16 BRPM | TEMPERATURE: 97.2 F

## 2024-10-16 DIAGNOSIS — Z95.828 PORT-A-CATH IN PLACE: ICD-10-CM

## 2024-10-16 DIAGNOSIS — C83.31 DIFFUSE LARGE B-CELL LYMPHOMA OF LYMPH NODES OF NECK (HCC): ICD-10-CM

## 2024-10-16 LAB
ALBUMIN SERPL BCP-MCNC: 4.4 G/DL (ref 3.2–4.9)
ALBUMIN/GLOB SERPL: 1.6 G/DL
ALP SERPL-CCNC: 89 U/L (ref 30–99)
ALT SERPL-CCNC: 37 U/L (ref 2–50)
ANION GAP SERPL CALC-SCNC: 12 MMOL/L (ref 7–16)
AST SERPL-CCNC: 52 U/L (ref 12–45)
BASOPHILS # BLD AUTO: 1.1 % (ref 0–1.8)
BASOPHILS # BLD: 0.06 K/UL (ref 0–0.12)
BILIRUB SERPL-MCNC: 0.5 MG/DL (ref 0.1–1.5)
BUN SERPL-MCNC: 17 MG/DL (ref 8–22)
CALCIUM ALBUM COR SERPL-MCNC: 9 MG/DL (ref 8.5–10.5)
CALCIUM SERPL-MCNC: 9.3 MG/DL (ref 8.5–10.5)
CHLORIDE SERPL-SCNC: 100 MMOL/L (ref 96–112)
CO2 SERPL-SCNC: 24 MMOL/L (ref 20–33)
CREAT SERPL-MCNC: 0.75 MG/DL (ref 0.5–1.4)
EOSINOPHIL # BLD AUTO: 0.3 K/UL (ref 0–0.51)
EOSINOPHIL NFR BLD: 5.4 % (ref 0–6.9)
ERYTHROCYTE [DISTWIDTH] IN BLOOD BY AUTOMATED COUNT: 39.7 FL (ref 35.9–50)
GFR SERPLBLD CREATININE-BSD FMLA CKD-EPI: 107 ML/MIN/1.73 M 2
GLOBULIN SER CALC-MCNC: 2.7 G/DL (ref 1.9–3.5)
GLUCOSE SERPL-MCNC: 123 MG/DL (ref 65–99)
HCT VFR BLD AUTO: 42 % (ref 42–52)
HGB BLD-MCNC: 14.3 G/DL (ref 14–18)
IMM GRANULOCYTES # BLD AUTO: 0.01 K/UL (ref 0–0.11)
IMM GRANULOCYTES NFR BLD AUTO: 0.2 % (ref 0–0.9)
LDH SERPL L TO P-CCNC: 310 U/L (ref 107–266)
LYMPHOCYTES # BLD AUTO: 1.84 K/UL (ref 1–4.8)
LYMPHOCYTES NFR BLD: 33.3 % (ref 22–41)
MCH RBC QN AUTO: 32.6 PG (ref 27–33)
MCHC RBC AUTO-ENTMCNC: 34 G/DL (ref 32.3–36.5)
MCV RBC AUTO: 95.7 FL (ref 81.4–97.8)
MONOCYTES # BLD AUTO: 0.43 K/UL (ref 0–0.85)
MONOCYTES NFR BLD AUTO: 7.8 % (ref 0–13.4)
NEUTROPHILS # BLD AUTO: 2.88 K/UL (ref 1.82–7.42)
NEUTROPHILS NFR BLD: 52.2 % (ref 44–72)
NRBC # BLD AUTO: 0 K/UL
NRBC BLD-RTO: 0 /100 WBC (ref 0–0.2)
PLATELET # BLD AUTO: 227 K/UL (ref 164–446)
PMV BLD AUTO: 8.8 FL (ref 9–12.9)
POTASSIUM SERPL-SCNC: 4 MMOL/L (ref 3.6–5.5)
PROT SERPL-MCNC: 7.1 G/DL (ref 6–8.2)
RBC # BLD AUTO: 4.39 M/UL (ref 4.7–6.1)
SODIUM SERPL-SCNC: 136 MMOL/L (ref 135–145)
WBC # BLD AUTO: 5.5 K/UL (ref 4.8–10.8)

## 2024-10-16 PROCEDURE — 85025 COMPLETE CBC W/AUTO DIFF WBC: CPT

## 2024-10-16 PROCEDURE — 700101 HCHG RX REV CODE 250: Performed by: NURSE PRACTITIONER

## 2024-10-16 PROCEDURE — A4212 NON CORING NEEDLE OR STYLET: HCPCS

## 2024-10-16 PROCEDURE — 83615 LACTATE (LD) (LDH) ENZYME: CPT

## 2024-10-16 PROCEDURE — 700111 HCHG RX REV CODE 636 W/ 250 OVERRIDE (IP): Performed by: NURSE PRACTITIONER

## 2024-10-16 PROCEDURE — 96523 IRRIG DRUG DELIVERY DEVICE: CPT

## 2024-10-16 PROCEDURE — 80053 COMPREHEN METABOLIC PANEL: CPT

## 2024-10-16 RX ADMIN — LIDOCAINE HYDROCHLORIDE 0.5 ML: 10 INJECTION, SOLUTION EPIDURAL; INFILTRATION; INTRACAUDAL at 14:11

## 2024-10-16 RX ADMIN — HEPARIN 500 UNITS: 100 SYRINGE at 14:12

## 2024-10-24 ENCOUNTER — OFFICE VISIT (OUTPATIENT)
Dept: DERMATOLOGY | Facility: IMAGING CENTER | Age: 54
End: 2024-10-24
Payer: COMMERCIAL

## 2024-10-24 DIAGNOSIS — L21.9 SEBORRHEIC DERMATITIS: ICD-10-CM

## 2024-10-24 PROCEDURE — 99213 OFFICE O/P EST LOW 20 MIN: CPT | Performed by: STUDENT IN AN ORGANIZED HEALTH CARE EDUCATION/TRAINING PROGRAM

## 2025-01-09 ENCOUNTER — APPOINTMENT (OUTPATIENT)
Dept: HEMATOLOGY ONCOLOGY | Facility: MEDICAL CENTER | Age: 55
End: 2025-01-09
Payer: COMMERCIAL

## 2025-01-15 ENCOUNTER — HOSPITAL ENCOUNTER (OUTPATIENT)
Dept: RADIOLOGY | Facility: MEDICAL CENTER | Age: 55
End: 2025-01-15
Attending: NURSE PRACTITIONER
Payer: COMMERCIAL

## 2025-01-15 DIAGNOSIS — C83.31 DIFFUSE LARGE B-CELL LYMPHOMA OF LYMPH NODES OF NECK (HCC): ICD-10-CM

## 2025-01-15 PROCEDURE — 71260 CT THORAX DX C+: CPT

## 2025-01-15 PROCEDURE — 700117 HCHG RX CONTRAST REV CODE 255: Performed by: NURSE PRACTITIONER

## 2025-01-15 RX ADMIN — IOHEXOL 100 ML: 350 INJECTION, SOLUTION INTRAVENOUS at 08:15

## 2025-01-16 ENCOUNTER — HOSPITAL ENCOUNTER (OUTPATIENT)
Facility: MEDICAL CENTER | Age: 55
End: 2025-01-16
Attending: NURSE PRACTITIONER
Payer: COMMERCIAL

## 2025-01-16 ENCOUNTER — HOSPITAL ENCOUNTER (OUTPATIENT)
Dept: HEMATOLOGY ONCOLOGY | Facility: MEDICAL CENTER | Age: 55
End: 2025-01-16
Attending: STUDENT IN AN ORGANIZED HEALTH CARE EDUCATION/TRAINING PROGRAM
Payer: COMMERCIAL

## 2025-01-16 VITALS
SYSTOLIC BLOOD PRESSURE: 140 MMHG | HEART RATE: 86 BPM | DIASTOLIC BLOOD PRESSURE: 91 MMHG | WEIGHT: 173 LBS | HEIGHT: 65 IN | TEMPERATURE: 97.5 F | OXYGEN SATURATION: 95 % | BODY MASS INDEX: 28.82 KG/M2

## 2025-01-16 VITALS
OXYGEN SATURATION: 95 % | SYSTOLIC BLOOD PRESSURE: 140 MMHG | RESPIRATION RATE: 18 BRPM | HEART RATE: 86 BPM | TEMPERATURE: 97.5 F | DIASTOLIC BLOOD PRESSURE: 91 MMHG

## 2025-01-16 DIAGNOSIS — Z85.79 ENCOUNTER FOR FOLLOW-UP SURVEILLANCE OF DIFFUSE LARGE B-CELL LYMPHOMA: ICD-10-CM

## 2025-01-16 DIAGNOSIS — C83.31 DIFFUSE LARGE B-CELL LYMPHOMA OF LYMPH NODES OF NECK (HCC): ICD-10-CM

## 2025-01-16 DIAGNOSIS — Z95.828 PORT-A-CATH IN PLACE: ICD-10-CM

## 2025-01-16 DIAGNOSIS — Z08 ENCOUNTER FOR FOLLOW-UP SURVEILLANCE OF DIFFUSE LARGE B-CELL LYMPHOMA: ICD-10-CM

## 2025-01-16 LAB
ALBUMIN SERPL BCP-MCNC: 4.1 G/DL (ref 3.2–4.9)
ALBUMIN/GLOB SERPL: 1.5 G/DL
ALP SERPL-CCNC: 75 U/L (ref 30–99)
ALT SERPL-CCNC: 24 U/L (ref 2–50)
ANION GAP SERPL CALC-SCNC: 11 MMOL/L (ref 7–16)
AST SERPL-CCNC: 24 U/L (ref 12–45)
BASOPHILS # BLD AUTO: 0.6 % (ref 0–1.8)
BASOPHILS # BLD: 0.05 K/UL (ref 0–0.12)
BILIRUB SERPL-MCNC: 0.7 MG/DL (ref 0.1–1.5)
BUN SERPL-MCNC: 13 MG/DL (ref 8–22)
CALCIUM ALBUM COR SERPL-MCNC: 8.5 MG/DL (ref 8.5–10.5)
CALCIUM SERPL-MCNC: 8.6 MG/DL (ref 8.5–10.5)
CHLORIDE SERPL-SCNC: 103 MMOL/L (ref 96–112)
CO2 SERPL-SCNC: 24 MMOL/L (ref 20–33)
CREAT SERPL-MCNC: 0.63 MG/DL (ref 0.5–1.4)
EOSINOPHIL # BLD AUTO: 0.18 K/UL (ref 0–0.51)
EOSINOPHIL NFR BLD: 2.1 % (ref 0–6.9)
ERYTHROCYTE [DISTWIDTH] IN BLOOD BY AUTOMATED COUNT: 39.6 FL (ref 35.9–50)
GFR SERPLBLD CREATININE-BSD FMLA CKD-EPI: 112 ML/MIN/1.73 M 2
GLOBULIN SER CALC-MCNC: 2.8 G/DL (ref 1.9–3.5)
GLUCOSE SERPL-MCNC: 114 MG/DL (ref 65–99)
HCT VFR BLD AUTO: 41.7 % (ref 42–52)
HGB BLD-MCNC: 13.9 G/DL (ref 14–18)
IMM GRANULOCYTES # BLD AUTO: 0.02 K/UL (ref 0–0.11)
IMM GRANULOCYTES NFR BLD AUTO: 0.2 % (ref 0–0.9)
LDH SERPL L TO P-CCNC: 195 U/L (ref 107–266)
LYMPHOCYTES # BLD AUTO: 1.91 K/UL (ref 1–4.8)
LYMPHOCYTES NFR BLD: 22.2 % (ref 22–41)
MCH RBC QN AUTO: 31.9 PG (ref 27–33)
MCHC RBC AUTO-ENTMCNC: 33.3 G/DL (ref 32.3–36.5)
MCV RBC AUTO: 95.6 FL (ref 81.4–97.8)
MONOCYTES # BLD AUTO: 0.53 K/UL (ref 0–0.85)
MONOCYTES NFR BLD AUTO: 6.2 % (ref 0–13.4)
NEUTROPHILS # BLD AUTO: 5.92 K/UL (ref 1.82–7.42)
NEUTROPHILS NFR BLD: 68.7 % (ref 44–72)
NRBC # BLD AUTO: 0 K/UL
NRBC BLD-RTO: 0 /100 WBC (ref 0–0.2)
PLATELET # BLD AUTO: 256 K/UL (ref 164–446)
PMV BLD AUTO: 8.6 FL (ref 9–12.9)
POTASSIUM SERPL-SCNC: 3.7 MMOL/L (ref 3.6–5.5)
PROT SERPL-MCNC: 6.9 G/DL (ref 6–8.2)
RBC # BLD AUTO: 4.36 M/UL (ref 4.7–6.1)
SODIUM SERPL-SCNC: 138 MMOL/L (ref 135–145)
WBC # BLD AUTO: 8.6 K/UL (ref 4.8–10.8)

## 2025-01-16 PROCEDURE — 85025 COMPLETE CBC W/AUTO DIFF WBC: CPT

## 2025-01-16 PROCEDURE — A4212 NON CORING NEEDLE OR STYLET: HCPCS

## 2025-01-16 PROCEDURE — 700111 HCHG RX REV CODE 636 W/ 250 OVERRIDE (IP): Performed by: NURSE PRACTITIONER

## 2025-01-16 PROCEDURE — 83615 LACTATE (LD) (LDH) ENZYME: CPT

## 2025-01-16 PROCEDURE — 99212 OFFICE O/P EST SF 10 MIN: CPT | Performed by: NURSE PRACTITIONER

## 2025-01-16 PROCEDURE — 80053 COMPREHEN METABOLIC PANEL: CPT

## 2025-01-16 PROCEDURE — 36591 DRAW BLOOD OFF VENOUS DEVICE: CPT

## 2025-01-16 PROCEDURE — 99214 OFFICE O/P EST MOD 30 MIN: CPT | Performed by: NURSE PRACTITIONER

## 2025-01-16 RX ORDER — HEPARIN SODIUM (PORCINE) LOCK FLUSH IV SOLN 100 UNIT/ML 100 UNIT/ML
500 SOLUTION INTRAVENOUS PRN
Status: DISCONTINUED | OUTPATIENT
Start: 2025-01-16 | End: 2025-01-17 | Stop reason: HOSPADM

## 2025-01-16 RX ADMIN — Medication 500 UNITS: at 14:46

## 2025-01-16 ASSESSMENT — FIBROSIS 4 INDEX: FIB4 SCORE: 2.07

## 2025-01-16 ASSESSMENT — ENCOUNTER SYMPTOMS
DIZZINESS: 0
SHORTNESS OF BREATH: 0
CONSTIPATION: 0
MYALGIAS: 0
HEADACHES: 0
DIAPHORESIS: 0
DIARRHEA: 0
VOMITING: 0
COUGH: 0
WHEEZING: 0
CHILLS: 0
NAUSEA: 0
FEVER: 0
PALPITATIONS: 0
WEIGHT LOSS: 0
TINGLING: 0

## 2025-01-16 NOTE — PROGRESS NOTES
Subjective     Azeem Mejia is a 54 y.o. male who presents with Lymphoma (3 month follow up / [facility] )          HPI    Patient seen today for evaluation for 3 month follow for diffuse large B-cell lymphoma. He presents unaccompanied for today's visit.      Oncology history of presenting illness:  Patient presented to his PCP on 8/17/2022.  He stated he noticed a lump under his chin approximately 2-3 weeks ago. Patient had labs completed on 8/17/2022.  There was no evidence of leukocytosis or anemia.  PCP also did inflammatory markers which were all negative.  Patient was sent for an ultrasound completed on 8/17/2022 which showed a 3.1 x 1.7 x 2.1 cm lymph node on the left.  There are some benign-appearing mildly enlarged lymph nodes on the right.  CT with contrast completed on 8/31/2022 showed a 2.2 x 2.5 x 3.4 cm mass in the left.  Reading radiologist stated that this is likely an abnormal enlarged lymph node and recommendation for biopsy.  Patient underwent biopsy on 9/7/2022 of the left submandibular lymph node, 1 jar of Formalin x 3 cores; 1 RPMI x 1 core obtained and sent to pathology for analysis. Initially preliminary path showed a B-cell lymphoproliferative disorder with a dim CD10 expression. Flow cytometry is consistent with a CD10 positive B-cell lymphoproliferative disorder as well. FISH analysis was negative for CCND1-IGH, BCL2, BCL6, and MYC. Patient returns today for the final path results. 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  C-MYC IHC is negative which shows that this is not a double hit expresser.      Treatment history:  10/27/22: C1D1 RCHOP  11/17/22: C2D1 RCHOP  12/08/22: C3D1 RCHOP  12/29/22: C4D1 RCHOP     Interval history:  Patient is doing very well at this time.  He continues to work dayshift at his work and works very long hours which leads to fatigue.  He is tired all the time from his job but otherwise everything else is very  "tolerable.  He denies any drenching night sweats or unplanned weight loss.  His appetite has been stable and overall feels well.    He had a CT scan completed today on 1/16/2025 which shows no evidence of recurrence or any adenopathy.  I did review the CT results in detail with the patient today.      Allergies   Allergen Reactions    Seasonal Runny Nose and Itching     Eyes and nose     Current Outpatient Medications on File Prior to Encounter   Medication Sig Dispense Refill    loratadine (CLARITIN) 10 MG Tab Take 10 mg by mouth every day.      clobetasol (TEMOVATE) 0.05 % external solution Apply twice daily to affected area 50 mL 5    ALBUTEROL INH Inhale.      lidocaine-prilocaine (EMLA) 2.5-2.5 % Cream Apply to port 1 hour prior to access of port and cover with plastic wrap. 30 g 3    acetaminophen (TYLENOL) 500 MG Tab Take 500 mg by mouth every 6 hours as needed. Indications: Pain       Current Facility-Administered Medications on File Prior to Encounter   Medication Dose Route Frequency Provider Last Rate Last Admin    heparin lock flush 100 unit/mL injection 500 Units  500 Units Intracatheter PRN Peggy Kinsey, A.P.N.           Review of Systems   Constitutional:  Positive for malaise/fatigue (from work). Negative for chills, diaphoresis, fever and weight loss.   Respiratory:  Negative for cough, shortness of breath and wheezing.    Cardiovascular:  Negative for chest pain and palpitations.   Gastrointestinal:  Negative for constipation, diarrhea, nausea and vomiting.   Genitourinary:  Negative for dysuria.   Musculoskeletal:  Negative for myalgias.   Skin:  Negative for itching and rash.   Neurological:  Negative for dizziness, tingling and headaches.              Objective     BP (!) 140/91 (BP Location: Left arm, Patient Position: Sitting, BP Cuff Size: Adult)   Pulse 86   Temp 36.4 °C (97.5 °F) (Temporal)   Ht 1.64 m (5' 4.57\")   Wt 78.5 kg (173 lb)   SpO2 95%   BMI 29.18 kg/m²      Physical " Exam  Vitals reviewed.   Constitutional:       General: He is not in acute distress.     Appearance: Normal appearance. He is not diaphoretic.   HENT:      Head: Normocephalic and atraumatic.   Cardiovascular:      Rate and Rhythm: Normal rate and regular rhythm.      Heart sounds: Normal heart sounds. No murmur heard.     No friction rub. No gallop.   Pulmonary:      Effort: Pulmonary effort is normal. No respiratory distress.      Breath sounds: Normal breath sounds. No wheezing.   Abdominal:      General: Bowel sounds are normal. There is no distension.      Palpations: Abdomen is soft.      Tenderness: There is no abdominal tenderness.   Musculoskeletal:         General: No swelling or tenderness. Normal range of motion.   Skin:     General: Skin is warm and dry.   Neurological:      Mental Status: He is alert and oriented to person, place, and time.   Psychiatric:         Mood and Affect: Mood normal.         Behavior: Behavior normal.            CT-CHEST,ABDOMEN,PELVIS WITH    Result Date: 1/16/2025  1/15/2025 7:33 AM HISTORY/REASON FOR EXAM:  Follow-up diffuse large B-cell lymphoma. TECHNIQUE/EXAM DESCRIPTION: CT scan of the chest, abdomen and pelvis with contrast. Thin-section helical scanning was obtained with intravenous contrast from the lung apices through the pubic symphysis to include the chest, abdomen and pelvis. 100 mL of Omnipaque 350 nonionic contrast was administered intravenously without complication. Low dose optimization technique was utilized for this CT exam including automated exposure control and adjustment of the mA and/or kV according to patient size. COMPARISON: Chest CT 7/2/2024 and additional FINDINGS: CT Chest: Lungs: Stable 7 mm right upper lobe pulmonary nodule image 20 series 2. Mediastinum/Eve: No adenopathy. Pleura: No pleural effusion. Cardiac: Heart normal in size. There is no coronary artery calcification. Vascular: Aorta is nonaneurysmal. Soft tissues: [id number]-[id number] noted.  Bones: No acute process or concerning osseous lesion. CT Abdomen: Liver: No focal liver lesion Spleen: Normal in size, without focal lesion Adrenal glands: Normal Pancreas: Normal Gallbladder: No radiodense stone visualized. Biliary: No biliary duct dilation visualized. Kidneys: Normal. No stone or hydronephrosis Vasculature: Nonaneurysmal Lymph nodes: No adenopathy Bowel: No evidence of obstruction or acute inflammation Appendix:  Appendix appears normal Peritoneum: No ascites Pelvis:  Urinary bladder is grossly normal. No pelvic mass or adenopathy identified. Musculoskeletal: No acute abnormality or concerning osseous lesion     1. Stable 7 mm right upper lobe pulmonary nodule since 2022. Likely benign. Recommend continued surveillance. 2. No evidence of lymphoma recurrence /adenopathy.              Assessment & Plan     1. Diffuse large B-cell lymphoma of lymph nodes of neck (HCC)  CBC WITH DIFFERENTIAL    Comp Metabolic Panel    LDH      2. Encounter for follow-up surveillance of diffuse large B-cell lymphoma                1.  Patient with history of diffuse large B-cell lymphoma status post 4 cycles of R-CHOP currently on surveillance.  We are completing labs every 3 months, with CT scans every 6 months.  His 6-month follow-up CT scan completed today, 1/16/2025 shows a stable 7 mm right upper lobe pulmonary nodule that has been present since 2022.  There is no evidence of lymphoma recurrence or adenopathy noted.  CBC, CMP and LDH labs are pending.     According to Dr. Kwan surveillance consists of labs every 3-6 months for 5 years, and then yearly as clinically indicated.  CT chest, abdomen and pelvis with contrast no more often than every 6 months for 2 years total.     Patient to follow-up in the clinic in 3 months with labs, or sooner if needed.        Please note that this dictation was created using voice recognition software. I have made every reasonable attempt to correct obvious errors, but I expect  that there are errors of grammar and possibly content that I did not discover before finalizing the note.

## 2025-01-16 NOTE — PROGRESS NOTES
Pt arrives to medical oncology for port flush with labs.  Port accessed in sterile manner. Brisk blood return obtained. Labs collected.  Port flushed per [facility] policy and site covered with sterile gauze and paper tape.  Pt released ambulatory from clinic in no apparent distress.

## 2025-01-16 NOTE — ADDENDUM NOTE
Encounter addended by: Gosia Rogers, Med Ass't on: 1/16/2025 3:03 PM   Actions taken: Charge Capture section accepted

## 2025-04-16 ENCOUNTER — OFFICE VISIT (OUTPATIENT)
Dept: DERMATOLOGY | Facility: IMAGING CENTER | Age: 55
End: 2025-04-16
Payer: COMMERCIAL

## 2025-04-16 DIAGNOSIS — L21.9 SEBORRHEIC DERMATITIS: ICD-10-CM

## 2025-04-16 DIAGNOSIS — L40.9 PSORIASIS: ICD-10-CM

## 2025-04-16 PROCEDURE — 99213 OFFICE O/P EST LOW 20 MIN: CPT | Performed by: STUDENT IN AN ORGANIZED HEALTH CARE EDUCATION/TRAINING PROGRAM

## 2025-04-16 RX ORDER — CLOBETASOL PROPIONATE 0.5 MG/G
OINTMENT TOPICAL
Qty: 60 G | Refills: 3 | Status: SHIPPED | OUTPATIENT
Start: 2025-04-16

## 2025-04-16 RX ORDER — CALCIPOTRIENE 50 UG/G
CREAM TOPICAL
Qty: 60 G | Refills: 3 | Status: SHIPPED | OUTPATIENT
Start: 2025-04-16

## 2025-04-16 NOTE — PROGRESS NOTES
[facility] Note    Chief Complaint   Patient presents with    Rash    Follow-Up     Scalp rash still itchy , flares up on - off   New rash on left leg x 2 months . Has been taking benadryl and clobetasol cream on leg slight improvement          HPI:  Azeem Mejia is a 54 y.o. male who presents today for follow up of scalp psoriasis, as well as new rash.     New rash on body over last few months. Is associated with itching. His mom has history of psoriasis and had Rx for clobetasol so he tried this. Showed a picture to his sister and thought may be ringworm,     Scalp under good control. Using clobetasol solution on scalp intermittently for itch as needed, T/sal shampoo.     Does not use tobacco products.       ROS: no fevers/chills. Other pertinent positives and negatives as above.     Meds/PMH/PSH/FamHx/Allergies:   I have reviewed past medical history, surgical history, medications family history, allergies relevant to my specialty in the chart.       PHYSICAL EXAM:   A focal skin exam was completed of the scalp, face with the following pertinent findings listed below.   Remaining above-listed examined areas within normal limits / negative for rashes or lesions.    - mild scaling throughout scalp, no discrete plaques today   - scaly red papules and plaques on right lower abdomen, bilateral shins         IMPRESSION / PLAN:    Sebopsoriasis - BSA 1%, clear today    - discussed etiology, prognosis, that this is a chronic skin condition   - c/w topical clobetasol 0.05% solution twice daily to affected area prn for flares; decrease use to few times weekly prn for maintenance as able   - c/w OTC T/SAL shampoo daily to affected area, rub in and let sit for 5 min   - call if worsening, other options in reserve     Chronic Plaque Psoriasis, BSA <5%    Discussed diagnosis, chronic nature of condition, treatment options.   Start clobetasol 0.05% ointment to affected area of body twice daily as  needed.  Start dovonex 0.005% cream twice daily to affected area as needed    Hold other systemics in reserve if not responding to above       Follow up: Return if symptoms worsen or fail to improve.        Billie Loredo MD   [facility]

## 2025-04-17 ENCOUNTER — HOSPITAL ENCOUNTER (OUTPATIENT)
Facility: MEDICAL CENTER | Age: 55
End: 2025-04-17
Attending: STUDENT IN AN ORGANIZED HEALTH CARE EDUCATION/TRAINING PROGRAM
Payer: COMMERCIAL

## 2025-04-17 ENCOUNTER — HOSPITAL ENCOUNTER (OUTPATIENT)
Dept: HEMATOLOGY ONCOLOGY | Facility: MEDICAL CENTER | Age: 55
End: 2025-04-17
Attending: STUDENT IN AN ORGANIZED HEALTH CARE EDUCATION/TRAINING PROGRAM
Payer: COMMERCIAL

## 2025-04-17 VITALS
HEART RATE: 75 BPM | TEMPERATURE: 97.3 F | OXYGEN SATURATION: 97 % | DIASTOLIC BLOOD PRESSURE: 68 MMHG | HEIGHT: 65 IN | WEIGHT: 171.96 LBS | SYSTOLIC BLOOD PRESSURE: 102 MMHG | BODY MASS INDEX: 28.65 KG/M2

## 2025-04-17 DIAGNOSIS — Z79.899 ENCOUNTER FOR LONG-TERM (CURRENT) USE OF HIGH-RISK MEDICATION: ICD-10-CM

## 2025-04-17 DIAGNOSIS — C83.31 DIFFUSE LARGE B-CELL LYMPHOMA OF LYMPH NODES OF NECK (HCC): ICD-10-CM

## 2025-04-17 DIAGNOSIS — Z95.828 PORT-A-CATH IN PLACE: ICD-10-CM

## 2025-04-17 LAB
ALBUMIN SERPL BCP-MCNC: 4.2 G/DL (ref 3.2–4.9)
ALBUMIN/GLOB SERPL: 1.3 G/DL
ALP SERPL-CCNC: 81 U/L (ref 30–99)
ALT SERPL-CCNC: 38 U/L (ref 2–50)
ANION GAP SERPL CALC-SCNC: 10 MMOL/L (ref 7–16)
AST SERPL-CCNC: 32 U/L (ref 12–45)
BASOPHILS # BLD AUTO: 0.9 % (ref 0–1.8)
BASOPHILS # BLD: 0.06 K/UL (ref 0–0.12)
BILIRUB SERPL-MCNC: 0.7 MG/DL (ref 0.1–1.5)
BUN SERPL-MCNC: 16 MG/DL (ref 8–22)
CALCIUM ALBUM COR SERPL-MCNC: 8.8 MG/DL (ref 8.5–10.5)
CALCIUM SERPL-MCNC: 9 MG/DL (ref 8.5–10.5)
CHLORIDE SERPL-SCNC: 100 MMOL/L (ref 96–112)
CO2 SERPL-SCNC: 25 MMOL/L (ref 20–33)
CREAT SERPL-MCNC: 0.95 MG/DL (ref 0.5–1.4)
EOSINOPHIL # BLD AUTO: 0.16 K/UL (ref 0–0.51)
EOSINOPHIL NFR BLD: 2.3 % (ref 0–6.9)
ERYTHROCYTE [DISTWIDTH] IN BLOOD BY AUTOMATED COUNT: 40.2 FL (ref 35.9–50)
GFR SERPLBLD CREATININE-BSD FMLA CKD-EPI: 94 ML/MIN/1.73 M 2
GLOBULIN SER CALC-MCNC: 3.2 G/DL (ref 1.9–3.5)
GLUCOSE SERPL-MCNC: 116 MG/DL (ref 65–99)
HCT VFR BLD AUTO: 44.2 % (ref 42–52)
HGB BLD-MCNC: 14.8 G/DL (ref 14–18)
IMM GRANULOCYTES # BLD AUTO: 0.02 K/UL (ref 0–0.11)
IMM GRANULOCYTES NFR BLD AUTO: 0.3 % (ref 0–0.9)
LDH SERPL L TO P-CCNC: 187 U/L (ref 107–266)
LYMPHOCYTES # BLD AUTO: 2.49 K/UL (ref 1–4.8)
LYMPHOCYTES NFR BLD: 35.6 % (ref 22–41)
MCH RBC QN AUTO: 32.1 PG (ref 27–33)
MCHC RBC AUTO-ENTMCNC: 33.5 G/DL (ref 32.3–36.5)
MCV RBC AUTO: 95.9 FL (ref 81.4–97.8)
MONOCYTES # BLD AUTO: 0.57 K/UL (ref 0–0.85)
MONOCYTES NFR BLD AUTO: 8.2 % (ref 0–13.4)
NEUTROPHILS # BLD AUTO: 3.69 K/UL (ref 1.82–7.42)
NEUTROPHILS NFR BLD: 52.7 % (ref 44–72)
NRBC # BLD AUTO: 0 K/UL
NRBC BLD-RTO: 0 /100 WBC (ref 0–0.2)
PLATELET # BLD AUTO: 268 K/UL (ref 164–446)
PMV BLD AUTO: 8.7 FL (ref 9–12.9)
POTASSIUM SERPL-SCNC: 4.3 MMOL/L (ref 3.6–5.5)
PROT SERPL-MCNC: 7.4 G/DL (ref 6–8.2)
RBC # BLD AUTO: 4.61 M/UL (ref 4.7–6.1)
SODIUM SERPL-SCNC: 135 MMOL/L (ref 135–145)
WBC # BLD AUTO: 7 K/UL (ref 4.8–10.8)

## 2025-04-17 PROCEDURE — 99212 OFFICE O/P EST SF 10 MIN: CPT | Performed by: STUDENT IN AN ORGANIZED HEALTH CARE EDUCATION/TRAINING PROGRAM

## 2025-04-17 PROCEDURE — 85025 COMPLETE CBC W/AUTO DIFF WBC: CPT

## 2025-04-17 PROCEDURE — A4212 NON CORING NEEDLE OR STYLET: HCPCS

## 2025-04-17 PROCEDURE — 999999 HB NO CHARGE

## 2025-04-17 PROCEDURE — 80053 COMPREHEN METABOLIC PANEL: CPT

## 2025-04-17 PROCEDURE — 99214 OFFICE O/P EST MOD 30 MIN: CPT | Performed by: STUDENT IN AN ORGANIZED HEALTH CARE EDUCATION/TRAINING PROGRAM

## 2025-04-17 PROCEDURE — 36591 DRAW BLOOD OFF VENOUS DEVICE: CPT

## 2025-04-17 PROCEDURE — 83615 LACTATE (LD) (LDH) ENZYME: CPT

## 2025-04-17 ASSESSMENT — ENCOUNTER SYMPTOMS
HEARTBURN: 0
ORTHOPNEA: 0
CHILLS: 0
HEADACHES: 0
FEVER: 0
ABDOMINAL PAIN: 0
FOCAL WEAKNESS: 0
NAUSEA: 0
WHEEZING: 1
COUGH: 0
SHORTNESS OF BREATH: 0
BLURRED VISION: 0
TREMORS: 0
PALPITATIONS: 0
NECK PAIN: 0
DIZZINESS: 0
SPUTUM PRODUCTION: 0
TINGLING: 0
BRUISES/BLEEDS EASILY: 0
MEMORY LOSS: 0
SENSORY CHANGE: 0
VOMITING: 0
SORE THROAT: 0
WEIGHT LOSS: 0
DEPRESSION: 0

## 2025-04-17 ASSESSMENT — FIBROSIS 4 INDEX: FIB4 SCORE: 1.05

## 2025-04-17 NOTE — PROGRESS NOTES
Consult Note: Hematology/Oncology     Referring Provider: Peggy Kinsey  Primary Care:  Carlos Mcnamara M.D.    Chief Complaint   Patient presents with    Lymphoma     3 month follow up       Current Treatment:   10/27/22: C1D1 RCHOP  11/17/22: C2D1 RCHOP  12/08/22: C3D1 RCHOP  12/29/22: C4D1 RCHOP    Prior Treatment: None    Subjective:   History of Presenting Illness:  Azeem Mejia is a 53 y.o. male who presents today with a new diagnosis of diffuse large B-cell lymphoma.    Patient was originally seen by his PCP on August 17, 2022 with a submandibular lymphadenopathy.  He reported a 2-week history of lymphadenopathy.  Blood work at that time showed no abnormalities.  Patient underwent an ultrasound the same day which showed a 3.1 x 1.7 x 2.1 cm lymph node on the left submandibular region.  He was referred to Peggy on August 30, 2022 for submandibular lymphadenopathy work-up.    Patient underwent a CT with contrast which showed a 2.2 x 2.5 x 3.4 cm mass.  And he underwent a biopsy on September 7 with initial pathology showing a B-cell lymphoproliferative disorder with dim CD10 expression.  FISH analysis was negative for CCN D1 IGH, Bcl-2, BCL6, MYC.  Final path showed diffuse large B-cell lymphoma with germinal center B type.  C-Myc was negative.  Patient is not a double hit expresser.    He works in [employer].  He was a major in literature. 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     Interval History:    Patient reports that he is doing great.  He is now on the day shift.  No unexpected weight loss, no fevers, chills and NS.     I personally reviewed his CT CAP from 1/2025 with no concerns.     Patient obtained labs through his port today, results still pending.     Past Medical History:   Diagnosis Date    Asthma     Cancer (HCC) 10/21/2022    left submandibular lymphoma large B  cell    Dental disorder 10/21/2022    upper partial, lower left front times 1 cap    Diffuse large B-cell lymphoma of lymph nodes of neck (HCC)     Hyperlipidemia         Past Surgical History:   Procedure Laterality Date    ND DX BONE MARROW ASPIRATIONS Left 9/27/2022    Procedure: BONE MARROW CORE AND ASPIRATION;  Surgeon: Subhash Mathews M.D.;  Location: SURGERY SAME DAY [facility];  Service: Orthopedics    ND DX BONE MARROW BIOPSIES Left 9/27/2022    Procedure: BIOPSY, BONE MARROW, USING NEEDLE OR TROCAR - FLEMING;  Surgeon: Subhash Mathews M.D.;  Location: SURGERY SAME DAY [facility];  Service: Orthopedics    NASAL POLYPECTOMY Right        Social History     Tobacco Use    Smoking status: Never    Smokeless tobacco: Never   Vaping Use    Vaping status: Never Used   Substance Use Topics    Alcohol use: Yes     Comment: very rare    Drug use: Not Currently        Family History   Problem Relation Age of Onset    Diabetes Father     Asthma Sister     Hyperlipidemia Neg Hx     Hypertension Neg Hx     Cancer Neg Hx        Allergies   Allergen Reactions    Seasonal Runny Nose and Itching     Eyes and nose       Current Outpatient Medications   Medication Sig Dispense Refill    clobetasol (TEMOVATE) 0.05 % Ointment Apply thin layer to affected areas daily until resolved. Avoid face, armpits, groin. 60 g 3    calcipotriene (DOVONEX) 0.005 % Cream Twice daily to area on the body as needed for rash 60 g 3    loratadine (CLARITIN) 10 MG Tab Take 10 mg by mouth every day.      clobetasol (TEMOVATE) 0.05 % external solution Apply twice daily to affected area 50 mL 5    ALBUTEROL INH Inhale.      lidocaine-prilocaine (EMLA) 2.5-2.5 % Cream Apply to port 1 hour prior to access of port and cover with plastic wrap. 30 g 3    acetaminophen (TYLENOL) 500 MG Tab Take 500 mg by mouth every 6 hours as needed. Indications: Pain       No current facility-administered medications for this encounter.       Review of Systems   Constitutional:   "Positive for malaise/fatigue (works nights, attributes fatigue to this). Negative for chills, fever and weight loss.   HENT:  Negative for congestion, ear pain, nosebleeds and sore throat.    Eyes:  Negative for blurred vision.   Respiratory:  Positive for wheezing. Negative for cough, sputum production and shortness of breath.    Cardiovascular:  Negative for chest pain, palpitations, orthopnea and leg swelling.   Gastrointestinal:  Negative for abdominal pain, heartburn, nausea and vomiting.   Genitourinary:  Negative for dysuria, frequency and urgency.   Musculoskeletal:  Negative for neck pain.   Neurological:  Negative for dizziness, tingling, tremors, sensory change, focal weakness and headaches.   Endo/Heme/Allergies:  Does not bruise/bleed easily.   Psychiatric/Behavioral:  Negative for depression, memory loss and suicidal ideas.    All other systems reviewed and are negative.      Problem list, medications, and allergies reviewed by myself today in Epic.     Objective:     Vitals:    04/17/25 1346   BP: 102/68   BP Location: Right arm   Patient Position: Sitting   BP Cuff Size: Adult   Pulse: 75   Temp: 36.3 °C (97.3 °F)   TempSrc: Temporal   SpO2: 97%   Weight: 78 kg (171 lb 15.3 oz)   Height: 1.64 m (5' 4.57\")       DESC; KARNOFSKY SCALE WITH ECOG EQUIVALENT: 90, Able to carry on normal activity; minor signs or symptoms of disease (ECOG equivalent 0)    DISTRESS LEVEL: no apparent distress    Physical Exam  Constitutional:       General: He is not in acute distress.     Appearance: Normal appearance.      Comments: Port in place, no sign of infection   HENT:      Head: Normocephalic and atraumatic.      Nose: Nose normal. No congestion.      Mouth/Throat:      Mouth: Mucous membranes are moist.      Pharynx: Oropharynx is clear.   Eyes:      General: No scleral icterus.     Conjunctiva/sclera: Conjunctivae normal.      Pupils: Pupils are equal, round, and reactive to light.   Cardiovascular:      Rate and " Rhythm: Normal rate and regular rhythm.      Pulses: Normal pulses.      Heart sounds: No murmur heard.     No friction rub.   Pulmonary:      Effort: Pulmonary effort is normal. No respiratory distress.      Breath sounds: No stridor. Wheezing present. No rales.   Chest:      Chest wall: No tenderness.   Abdominal:      General: Abdomen is flat. Bowel sounds are normal. There is no distension.      Palpations: Abdomen is soft. There is no mass.      Tenderness: There is no abdominal tenderness. There is no guarding or rebound.   Musculoskeletal:         General: No swelling, tenderness or deformity. Normal range of motion.      Cervical back: Normal range of motion and neck supple. No rigidity or tenderness.      Right lower leg: No edema.      Left lower leg: No edema.   Skin:     General: Skin is warm.      Coloration: Skin is not jaundiced or pale.      Findings: No bruising or rash.   Neurological:      General: No focal deficit present.      Mental Status: He is alert and oriented to person, place, and time. Mental status is at baseline.      Motor: No weakness.   Psychiatric:         Mood and Affect: Mood normal.         Behavior: Behavior normal.         Thought Content: Thought content normal.         Judgment: Judgment normal.         Labs:   Most recent labs reviewed.  CBC and CMP  grossly normal.  HIV negative hepatitis B negative LDH normal uric acid normal     Imaging:   Most recent images below have been independently reviewed by me.      11/29/23 CT CAP  1. No evidence of disease recurrence.  2. Stable likely benign 5 mm right upper lobe pulmonary nodule.  3. 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    CT CAP  IMPRESSION:     1.  No lymphadenopathy in the chest, abdomen or pelvis. No splenomegaly.  2.  Stable 5 mm subpleural nodule in the right lung apex. No new pulmonary nodules or masses.    ECHO  Normal left ventricular size, thickness, and systolic function.  The left ventricular ejection fraction is visually  estimated to be 55%.  Mild hypokinesis basal posterior wall otherwise normal regional wall   motion.  Normal right ventricular size and systolic function.  No prior study is available for comparison    PET SCAN    1. Interval decrease in size of the left submandibular lymph node mass, measuring 1.3 x 1.9 cm, previously 2.5 x 3.5 cm. The mass demonstrates mild increased uptake (SUV max 5.6 )     2. Additional uptake in the nonenlarged left level 2/3 lymph nodes (SUV max 6.7) and right level 2 lymph nodes (SUV max 5.3) could relate to lymphoma as well      PET scan 12/27/22     1. Complete response to therapy with resolution of prior left submandibular lymph node mass. Deauville score is 1.     2. No increased uptake in the bilateral neck. No enlarged lymph nodes in the neck.     Pathology:  FINAL DIAGNOSIS:     A. Left submandibular node core biopsy:          Diffuse large B cell lymphoma, GCB type.          C-MYC IHC is negative which shows this is not a double           expressor.     Synoptic Summary for Non-Hodgkin Lymphoma/Lymphoid Neoplasms:          -Specimen: Lymph node        -Procedure: Core biopsy        -Tumor site: Left submandibular        -Histologic type: Diffuse large B cell lymphoma, GCB        -Immunophenotyping:            Flow cytometry: Performed; please refer to microscopic             description for details.            Immunohistochemistry: Performed; please refer to microscopic             description for details.        -Cytogenetic Studies: Not performed.        -Molecular Genetic Studies: FISH is negative for Bcl-2, Bcl-6,         C-MYC and t(11;14)- negative for double/triple hit.            Comment: One area of the core shows lower grade cells that have           a lower Ki-67 of   10% and may either represent either only a           partially involved node or an area of lower grade lymphoma such           as follicular lymphoma.  If this distinction is clinically           important  than an excision of the whole node is necessary for           evaluation.  This case has been reviewed by a second           pathologist, Dr. Villanueva, who concurs with the diagnosis.       Bone Marrow Biopsy  FINAL DIAGNOSIS:     Peripheral blood smear and CBC:          Normal white blood cell count demonstrating a mild relative           monocytosis.   Bone marrow aspirate, clot, and core biopsy:          -Normal cellular bone marrow demonstrating:          -There is no morphologic, immunohistochemical or flow cytometric           evidence for bone marrow involvement by lymphoma.          -Trilineage hematopoiesis with maturation.          -No morphologic increase in blast cells.          -Focal dysmegakaryocytopoiesis (<10%).          -Mild increased polyclonal plasma cells.          -Adequate stainable bone marrow iron stores.          See comment.         Assessment/Plan:      Cancer Staging   Diffuse large B-cell lymphoma of lymph nodes of neck (HCC)  Staging form: Hodgkin and Non-Hodgkin Lymphoma, AJCC 8th Edition  - Clinical stage from 10/4/2022: Stage IIE (Diffuse large B-cell lymphoma) - Signed by Henna Kwan M.D. on 10/4/2022         Mr. Mejia is a 54 yo M who presents today for surveillance after RCHOP treatment for DLBCL.     Patient is now s/p 2 years from treatment and no longer requires imaging per NCCN.    We will continue labs for 5 years.     We discussed that I will be monitoring his labs once they result.     Plan:  -HP in 3 months (7/30/25)  -no more imaging.  -patient to get labs CBC, CMP, LDH in 3 months (9/30/24)  -patient has PORT in place, will need it flushed.       Surveillance will consist of:  -HP and labs every 3-6 months for 5 years and then yearly or as clinically indicated  Managing will consist of a CT chest abdomen pelvis with contrast no more often than every 6 months for 2 years      #2. Lung nodule  -Very small on CT CAP, stable on his 1/2025  -will closely  monitor    #3.  Pars defects  -followed by ortho    #4. Wheeze  -pt reports this is 2/2 to asthma and allergies    #5. Port in place  -pt wants to wait and we will reassess    #6.  Psoriasis  -pt referred to Dermatology, and was told today that it was psoriasis  -was rx: clobetasol and calcipotriene    #7. Port in place  -wants to keep it in for now  -will continue flushing q3 months.      Patient has multiple chronic issues, 1 of which we are monitoring closely for progression.      Any questions and concerns raised by the patient were addressed and answered. Patient denies any further questions.  Patient encouraged to call the office with any concerns or issues.     Henna Kwan M.D.  Hematology/Oncology

## 2025-04-17 NOTE — PROGRESS NOTES
Pt arrives to medical oncology for port access with lab draw.  Right port site cleaned and accessed in sterile fashion with 20 gauge, 1 inch garcia needle. Port with brisk blood return.  Labs were obtained. Pt tolerated well.  Port flushed with brisk blood return; saline locked and secured with gauze and tape.  Pt back to the lobby to see Dr Kwan.

## 2025-06-26 ENCOUNTER — HOSPITAL ENCOUNTER (OUTPATIENT)
Facility: MEDICAL CENTER | Age: 55
End: 2025-06-26
Attending: STUDENT IN AN ORGANIZED HEALTH CARE EDUCATION/TRAINING PROGRAM
Payer: COMMERCIAL

## 2025-06-26 ENCOUNTER — HOSPITAL ENCOUNTER (OUTPATIENT)
Dept: HEMATOLOGY ONCOLOGY | Facility: MEDICAL CENTER | Age: 55
End: 2025-06-26
Attending: STUDENT IN AN ORGANIZED HEALTH CARE EDUCATION/TRAINING PROGRAM
Payer: COMMERCIAL

## 2025-06-26 DIAGNOSIS — C83.31 DIFFUSE LARGE B-CELL LYMPHOMA OF LYMPH NODES OF NECK (HCC): Primary | ICD-10-CM

## 2025-06-26 DIAGNOSIS — Z79.899 ENCOUNTER FOR LONG-TERM (CURRENT) USE OF HIGH-RISK MEDICATION: ICD-10-CM

## 2025-06-26 DIAGNOSIS — C83.31 DIFFUSE LARGE B-CELL LYMPHOMA OF LYMPH NODES OF NECK (HCC): ICD-10-CM

## 2025-06-26 LAB
ALBUMIN SERPL BCP-MCNC: 4.4 G/DL (ref 3.2–4.9)
ALBUMIN/GLOB SERPL: 1.5 G/DL
ALP SERPL-CCNC: 81 U/L (ref 30–99)
ALT SERPL-CCNC: 35 U/L (ref 2–50)
ANION GAP SERPL CALC-SCNC: 12 MMOL/L (ref 7–16)
AST SERPL-CCNC: 33 U/L (ref 12–45)
BASOPHILS # BLD AUTO: 0.7 % (ref 0–1.8)
BASOPHILS # BLD: 0.05 K/UL (ref 0–0.12)
BILIRUB SERPL-MCNC: 0.8 MG/DL (ref 0.1–1.5)
BUN SERPL-MCNC: 13 MG/DL (ref 8–22)
CALCIUM ALBUM COR SERPL-MCNC: 9 MG/DL (ref 8.5–10.5)
CALCIUM SERPL-MCNC: 9.3 MG/DL (ref 8.5–10.5)
CHLORIDE SERPL-SCNC: 98 MMOL/L (ref 96–112)
CO2 SERPL-SCNC: 24 MMOL/L (ref 20–33)
CREAT SERPL-MCNC: 0.82 MG/DL (ref 0.5–1.4)
EOSINOPHIL # BLD AUTO: 0.27 K/UL (ref 0–0.51)
EOSINOPHIL NFR BLD: 3.9 % (ref 0–6.9)
ERYTHROCYTE [DISTWIDTH] IN BLOOD BY AUTOMATED COUNT: 39.8 FL (ref 35.9–50)
GFR SERPLBLD CREATININE-BSD FMLA CKD-EPI: 103 ML/MIN/1.73 M 2
GLOBULIN SER CALC-MCNC: 2.9 G/DL (ref 1.9–3.5)
GLUCOSE SERPL-MCNC: 90 MG/DL (ref 65–99)
HCT VFR BLD AUTO: 41.7 % (ref 42–52)
HGB BLD-MCNC: 14.1 G/DL (ref 14–18)
IMM GRANULOCYTES # BLD AUTO: 0.02 K/UL (ref 0–0.11)
IMM GRANULOCYTES NFR BLD AUTO: 0.3 % (ref 0–0.9)
LDH SERPL L TO P-CCNC: 202 U/L (ref 107–266)
LYMPHOCYTES # BLD AUTO: 2.76 K/UL (ref 1–4.8)
LYMPHOCYTES NFR BLD: 40.1 % (ref 22–41)
MCH RBC QN AUTO: 32.3 PG (ref 27–33)
MCHC RBC AUTO-ENTMCNC: 33.8 G/DL (ref 32.3–36.5)
MCV RBC AUTO: 95.4 FL (ref 81.4–97.8)
MONOCYTES # BLD AUTO: 0.51 K/UL (ref 0–0.85)
MONOCYTES NFR BLD AUTO: 7.4 % (ref 0–13.4)
NEUTROPHILS # BLD AUTO: 3.27 K/UL (ref 1.82–7.42)
NEUTROPHILS NFR BLD: 47.6 % (ref 44–72)
NRBC # BLD AUTO: 0 K/UL
NRBC BLD-RTO: 0 /100 WBC (ref 0–0.2)
PLATELET # BLD AUTO: 248 K/UL (ref 164–446)
PMV BLD AUTO: 9.2 FL (ref 9–12.9)
POTASSIUM SERPL-SCNC: 3.7 MMOL/L (ref 3.6–5.5)
PROT SERPL-MCNC: 7.3 G/DL (ref 6–8.2)
RBC # BLD AUTO: 4.37 M/UL (ref 4.7–6.1)
SODIUM SERPL-SCNC: 134 MMOL/L (ref 135–145)
WBC # BLD AUTO: 6.9 K/UL (ref 4.8–10.8)

## 2025-06-26 PROCEDURE — 83615 LACTATE (LD) (LDH) ENZYME: CPT

## 2025-06-26 PROCEDURE — 80053 COMPREHEN METABOLIC PANEL: CPT

## 2025-06-26 PROCEDURE — 85025 COMPLETE CBC W/AUTO DIFF WBC: CPT

## 2025-06-26 NOTE — PROGRESS NOTES
Pt arrives to medical oncology for port access with lab draw.  Right port site cleaned and accessed in sterile fashion with 20 gauge, 3/4 inch garcia needle. Port with brisk blood return.  Labs were obtained. Pt tolerated well.  Port flushed with brisk blood return; saline locked and secured with band-aide.  Pt released ambulatory to home in no apparent distress.

## 2025-07-03 ENCOUNTER — HOSPITAL ENCOUNTER (OUTPATIENT)
Dept: HEMATOLOGY ONCOLOGY | Facility: MEDICAL CENTER | Age: 55
End: 2025-07-03
Attending: STUDENT IN AN ORGANIZED HEALTH CARE EDUCATION/TRAINING PROGRAM
Payer: COMMERCIAL

## 2025-07-03 VITALS
SYSTOLIC BLOOD PRESSURE: 122 MMHG | WEIGHT: 171.08 LBS | DIASTOLIC BLOOD PRESSURE: 74 MMHG | TEMPERATURE: 98.1 F | OXYGEN SATURATION: 96 % | HEIGHT: 65 IN | HEART RATE: 85 BPM | BODY MASS INDEX: 28.5 KG/M2

## 2025-07-03 DIAGNOSIS — C83.31 DIFFUSE LARGE B-CELL LYMPHOMA OF LYMPH NODES OF NECK (HCC): Primary | ICD-10-CM

## 2025-07-03 PROCEDURE — 99214 OFFICE O/P EST MOD 30 MIN: CPT | Performed by: STUDENT IN AN ORGANIZED HEALTH CARE EDUCATION/TRAINING PROGRAM

## 2025-07-03 PROCEDURE — 99212 OFFICE O/P EST SF 10 MIN: CPT | Performed by: STUDENT IN AN ORGANIZED HEALTH CARE EDUCATION/TRAINING PROGRAM

## 2025-07-03 ASSESSMENT — ENCOUNTER SYMPTOMS
SENSORY CHANGE: 0
PALPITATIONS: 0
NECK PAIN: 0
VOMITING: 0
COUGH: 0
DIZZINESS: 0
ABDOMINAL PAIN: 0
TREMORS: 0
BRUISES/BLEEDS EASILY: 0
SHORTNESS OF BREATH: 0
FEVER: 0
SPUTUM PRODUCTION: 0
ORTHOPNEA: 0
NAUSEA: 0
DEPRESSION: 0
FOCAL WEAKNESS: 0
BLURRED VISION: 0
HEARTBURN: 0
TINGLING: 0
CHILLS: 0
MEMORY LOSS: 0
WHEEZING: 1
WEIGHT LOSS: 0
SORE THROAT: 0
HEADACHES: 0

## 2025-07-03 ASSESSMENT — FIBROSIS 4 INDEX: FIB4 SCORE: 1.24

## 2025-07-03 NOTE — ADDENDUM NOTE
Encounter addended by: Jorge Espinoza on: 7/3/2025 4:07 PM   Actions taken: Charge Capture section accepted

## 2025-07-03 NOTE — PROGRESS NOTES
Consult Note: Hematology/Oncology     Referring Provider: Peggy Kinsey  Primary Care:  Carlos Mcnamara M.D.    Chief Complaint   Patient presents with    Lymphoma     Three month follow up        Current Treatment:   10/27/22: C1D1 RCHOP  11/17/22: C2D1 R[facility]OP  12/08/22: C3D1 R[facility]OP  12/29/22: C4D1 RCHOP    Prior Treatment: None    Subjective:   History of Presenting Illness:  Azeem Mejia is a 53 y.o. male who presents today with a new diagnosis of diffuse large B-cell lymphoma.    Patient was originally seen by his PCP on August 17, 2022 with a submandibular lymphadenopathy.  He reported a 2-week history of lymphadenopathy.  Blood work at that time showed no abnormalities.  Patient underwent an ultrasound the same day which showed a 3.1 x 1.7 x 2.1 cm lymph node on the left submandibular region.  He was referred to Peggy on August 30, 2022 for submandibular lymphadenopathy work-up.    Patient underwent a CT with contrast which showed a 2.2 x 2.5 x 3.4 cm mass.  And he underwent a biopsy on September 7 with initial pathology showing a B-cell lymphoproliferative disorder with dim CD10 expression.  FISH analysis was negative for CCN D1 IGH, Bcl-2, BCL6, MYC.  Final path showed diffuse large B-cell lymphoma with germinal center B type.  C-Myc was negative.  Patient is not a double hit expresser.    He works in [employer].  He was a major in literature. 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     Interval History:    Patient reports that he is doing great.  No unexpected weight loss, no fevers, chills and NS.     Labs reviewed with no concern.    He takes daily iron    Past Medical History:   Diagnosis Date    Asthma     Cancer (HCC) 10/21/2022    left submandibular lymphoma large B cell    Dental disorder 10/21/2022    upper partial, lower left front times 1 cap    Diffuse large B-cell  lymphoma of lymph nodes of neck (HCC)     Hyperlipidemia         Past Surgical History:   Procedure Laterality Date    DC DX BONE MARROW ASPIRATIONS Left 9/27/2022    Procedure: BONE MARROW CORE AND ASPIRATION;  Surgeon: Subhash Mathews M.D.;  Location: SURGERY SAME DAY [facility];  Service: Orthopedics    DC DX BONE MARROW BIOPSIES Left 9/27/2022    Procedure: BIOPSY, BONE MARROW, USING NEEDLE OR TROCAR - FLEMING;  Surgeon: Subhash Mathews M.D.;  Location: SURGERY SAME DAY [facility];  Service: Orthopedics    NASAL POLYPECTOMY Right        Social History     Tobacco Use    Smoking status: Never    Smokeless tobacco: Never   Vaping Use    Vaping status: Never Used   Substance Use Topics    Alcohol use: Yes     Comment: very rare    Drug use: Not Currently        Family History   Problem Relation Age of Onset    Diabetes Father     Asthma Sister     Hyperlipidemia Neg Hx     Hypertension Neg Hx     Cancer Neg Hx        Allergies   Allergen Reactions    Seasonal Runny Nose and Itching     Eyes and nose       Current Outpatient Medications   Medication Sig Dispense Refill    clobetasol (TEMOVATE) 0.05 % Ointment Apply thin layer to affected areas daily until resolved. Avoid face, armpits, groin. 60 g 3    calcipotriene (DOVONEX) 0.005 % Cream Twice daily to area on the body as needed for rash 60 g 3    loratadine (CLARITIN) 10 MG Tab Take 10 mg by mouth every day.      ALBUTEROL INH Inhale.      lidocaine-prilocaine (EMLA) 2.5-2.5 % Cream Apply to port 1 hour prior to access of port and cover with plastic wrap. 30 g 3    acetaminophen (TYLENOL) 500 MG Tab Take 500 mg by mouth every 6 hours as needed. Indications: Pain      clobetasol (TEMOVATE) 0.05 % external solution Apply twice daily to affected area 50 mL 5     No current facility-administered medications for this encounter.       Review of Systems   Constitutional:  Positive for malaise/fatigue (works nights, attributes fatigue to this). Negative for chills, fever and  "weight loss.   HENT:  Negative for congestion, ear pain, nosebleeds and sore throat.    Eyes:  Negative for blurred vision.   Respiratory:  Positive for wheezing. Negative for cough, sputum production and shortness of breath.    Cardiovascular:  Negative for chest pain, palpitations, orthopnea and leg swelling.   Gastrointestinal:  Negative for abdominal pain, heartburn, nausea and vomiting.   Genitourinary:  Negative for dysuria, frequency and urgency.   Musculoskeletal:  Negative for neck pain.   Neurological:  Negative for dizziness, tingling, tremors, sensory change, focal weakness and headaches.   Endo/Heme/Allergies:  Does not bruise/bleed easily.   Psychiatric/Behavioral:  Negative for depression, memory loss and suicidal ideas.    All other systems reviewed and are negative.      Problem list, medications, and allergies reviewed by myself today in Epic.     Objective:     Vitals:    07/03/25 1534   BP: 122/74   BP Location: Right arm   Patient Position: Sitting   BP Cuff Size: Adult   Pulse: 85   Temp: 36.7 °C (98.1 °F)   TempSrc: Temporal   SpO2: 96%   Weight: 77.6 kg (171 lb 1.2 oz)   Height: 1.64 m (5' 4.57\")       DESC; KARNOFSKY SCALE WITH ECOG EQUIVALENT: 90, Able to carry on normal activity; minor signs or symptoms of disease (ECOG equivalent 0)    DISTRESS LEVEL: no apparent distress    Physical Exam  Constitutional:       General: He is not in acute distress.     Appearance: Normal appearance.      Comments: Port in place, no sign of infection   HENT:      Head: Normocephalic and atraumatic.      Nose: Nose normal. No congestion.      Mouth/Throat:      Mouth: Mucous membranes are moist.      Pharynx: Oropharynx is clear.   Eyes:      General: No scleral icterus.     Conjunctiva/sclera: Conjunctivae normal.      Pupils: Pupils are equal, round, and reactive to light.   Cardiovascular:      Rate and Rhythm: Normal rate and regular rhythm.      Pulses: Normal pulses.      Heart sounds: No murmur " heard.     No friction rub.   Pulmonary:      Effort: Pulmonary effort is normal. No respiratory distress.      Breath sounds: No stridor. Wheezing present. No rales.   Chest:      Chest wall: No tenderness.   Abdominal:      General: Abdomen is flat. Bowel sounds are normal. There is no distension.      Palpations: Abdomen is soft. There is no mass.      Tenderness: There is no abdominal tenderness. There is no guarding or rebound.   Musculoskeletal:         General: No swelling, tenderness or deformity. Normal range of motion.      Cervical back: Normal range of motion and neck supple. No rigidity or tenderness.      Right lower leg: No edema.      Left lower leg: No edema.   Skin:     General: Skin is warm.      Coloration: Skin is not jaundiced or pale.      Findings: No bruising or rash.   Neurological:      General: No focal deficit present.      Mental Status: He is alert and oriented to person, place, and time. Mental status is at baseline.      Motor: No weakness.   Psychiatric:         Mood and Affect: Mood normal.         Behavior: Behavior normal.         Thought Content: Thought content normal.         Judgment: Judgment normal.         Labs:   Most recent labs reviewed.  CBC and CMP  grossly normal.  HIV negative hepatitis B negative LDH normal uric acid normal     Imaging:   Most recent images below have been independently reviewed by me.      11/29/23 CT CAP  1. No evidence of disease recurrence.  2. Stable likely benign 5 mm right upper lobe pulmonary nodule.  3. 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    CT CAP  IMPRESSION:     1.  No lymphadenopathy in the chest, abdomen or pelvis. No splenomegaly.  2.  Stable 5 mm subpleural nodule in the right lung apex. No new pulmonary nodules or masses.    ECHO  Normal left ventricular size, thickness, and systolic function.  The left ventricular ejection fraction is visually estimated to be 55%.  Mild hypokinesis basal posterior wall otherwise normal regional wall    motion.  Normal right ventricular size and systolic function.  No prior study is available for comparison    PET SCAN    1. Interval decrease in size of the left submandibular lymph node mass, measuring 1.3 x 1.9 cm, previously 2.5 x 3.5 cm. The mass demonstrates mild increased uptake (SUV max 5.6 )     2. Additional uptake in the nonenlarged left level 2/3 lymph nodes (SUV max 6.7) and right level 2 lymph nodes (SUV max 5.3) could relate to lymphoma as well      PET scan 12/27/22     1. Complete response to therapy with resolution of prior left submandibular lymph node mass. Deauville score is 1.     2. No increased uptake in the bilateral neck. No enlarged lymph nodes in the neck.     Pathology:  FINAL DIAGNOSIS:     A. Left submandibular node core biopsy:          Diffuse large B cell lymphoma, GCB type.          C-MYC IHC is negative which shows this is not a double           expressor.     Synoptic Summary for Non-Hodgkin Lymphoma/Lymphoid Neoplasms:          -Specimen: Lymph node        -Procedure: Core biopsy        -Tumor site: Left submandibular        -Histologic type: Diffuse large B cell lymphoma, GCB        -Immunophenotyping:            Flow cytometry: Performed; please refer to microscopic             description for details.            Immunohistochemistry: Performed; please refer to microscopic             description for details.        -Cytogenetic Studies: Not performed.        -Molecular Genetic Studies: FISH is negative for Bcl-2, Bcl-6,         C-MYC and t(11;14)- negative for double/triple hit.            Comment: One area of the core shows lower grade cells that have           a lower Ki-67 of   10% and may either represent either only a           partially involved node or an area of lower grade lymphoma such           as follicular lymphoma.  If this distinction is clinically           important than an excision of the whole node is necessary for           evaluation.  This case has been  reviewed by a second           pathologist, Dr. Villanueva, who concurs with the diagnosis.       Bone Marrow Biopsy  FINAL DIAGNOSIS:     Peripheral blood smear and CBC:          Normal white blood cell count demonstrating a mild relative           monocytosis.   Bone marrow aspirate, clot, and core biopsy:          -Normal cellular bone marrow demonstrating:          -There is no morphologic, immunohistochemical or flow cytometric           evidence for bone marrow involvement by lymphoma.          -Trilineage hematopoiesis with maturation.          -No morphologic increase in blast cells.          -Focal dysmegakaryocytopoiesis (<10%).          -Mild increased polyclonal plasma cells.          -Adequate stainable bone marrow iron stores.          See comment.         Assessment/Plan:      Cancer Staging   Diffuse large B-cell lymphoma of lymph nodes of neck (HCC)  Staging form: Hodgkin and Non-Hodgkin Lymphoma, AJCC 8th Edition  - Clinical stage from 10/4/2022: Stage IIE (Diffuse large B-cell lymphoma) - Signed by Henna Kwan M.D. on 10/4/2022         Mr. Mejia is a 54 yo M who presents today for surveillance after RCHOP treatment for DLBCL.     Patient is now s/p 2 years from treatment and no longer requires imaging per NCCN.    We will continue labs for 5 years.     We discussed that I will be monitoring his labs once they result.     Plan:  -HP in 6 months (12/25)  -no more imaging.  -patient to get labs CBC, CMP, LDH in 6 months (12/2025)  -patient has PORT in place, will need it flushed.       Surveillance will consist of:  -HP and labs every 3-6 months for 5 years and then yearly or as clinically indicated  Managing will consist of a CT chest abdomen pelvis with contrast no more often than every 6 months for 2 years      #2. Lung nodule  -Very small on CT CAP, stable on his 1/2025  -will closely monitor on next scan    #3.  Pars defects  -followed by ortho    #4. Wheeze  -pt reports this is 2/2 to  asthma and allergies    #5. Port in place  -pt wants to wait and we will reassess    #6.  Psoriasis  -pt referred to Dermatology, and was told today that it was psoriasis  -was rx: clobetasol and calcipotriene    #7. Port in place  -wants to keep it in for now  -will continue flushing q3 months.      Patient has multiple chronic issues, 1 of which we are monitoring closely for progression.  I reviewed 3+ labs. I ordered 3+ labs    Any questions and concerns raised by the patient were addressed and answered. Patient denies any further questions.  Patient encouraged to call the office with any concerns or issues.     Henna Kwan M.D.  Hematology/Oncology

## (undated) DEVICE — GOWN WARMING STANDARD FLEX - (30/CA)

## (undated) DEVICE — SOD. CHL 10CC SYRINGE PREFILL - W/10 CC (30/BX)

## (undated) DEVICE — SET LEADWIRE 5 LEAD BEDSIDE DISPOSABLE ECG (1SET OF 5/EA)

## (undated) DEVICE — SENSOR OXIMETER ADULT SPO2 RD SET (20EA/BX)

## (undated) DEVICE — SYRINGE 3 CC 22 GA X 1-1/2 - NDL SAFETY (50/BX 8BX/CA)

## (undated) DEVICE — CUSHION EAR E-Z WRAP NASAL CANNULA - (25/CA)

## (undated) DEVICE — KIT  I.V. START (100EA/CA)

## (undated) DEVICE — SYRINGE NON SAFETY 5 CC 20 GA X 1-1/2 IN (100/BX 4BX/CA)

## (undated) DEVICE — TOWEL STOP TIMEOUT SAFETY FLAG (40EA/CA)

## (undated) DEVICE — BANDAID SHEER STRIP 3/4 IN (100EA/BX 12BX/CA)